# Patient Record
Sex: MALE | Race: WHITE | Employment: FULL TIME | URBAN - METROPOLITAN AREA
[De-identification: names, ages, dates, MRNs, and addresses within clinical notes are randomized per-mention and may not be internally consistent; named-entity substitution may affect disease eponyms.]

---

## 2017-01-16 ENCOUNTER — PATIENT MESSAGE (OUTPATIENT)
Dept: INTERNAL MEDICINE CLINIC | Facility: CLINIC | Age: 42
End: 2017-01-16

## 2017-01-21 NOTE — TELEPHONE ENCOUNTER
From: Jo Mehta  To: Kota Ordonez MD  Sent: 1/16/2017 1:16 PM CST  Subject: Other    I know another urine test was scheduled for me, can you confirm when I should go for it?  Also is there anything special I need to do in advance for it (lik

## 2017-01-27 ENCOUNTER — APPOINTMENT (OUTPATIENT)
Dept: LAB | Facility: HOSPITAL | Age: 42
End: 2017-01-27
Attending: INTERNAL MEDICINE
Payer: COMMERCIAL

## 2017-01-27 DIAGNOSIS — R82.90 ABNORMAL URINE FINDINGS: ICD-10-CM

## 2017-01-27 LAB
BILIRUB UR QL: NEGATIVE
CLARITY UR: CLEAR
COLOR UR: YELLOW
GLUCOSE UR-MCNC: NEGATIVE MG/DL
HGB UR QL STRIP.AUTO: NEGATIVE
KETONES UR-MCNC: NEGATIVE MG/DL
LEUKOCYTE ESTERASE UR QL STRIP.AUTO: NEGATIVE
NITRITE UR QL STRIP.AUTO: NEGATIVE
PH UR: 6 [PH] (ref 5–8)
PROT UR-MCNC: NEGATIVE MG/DL
SP GR UR STRIP: 1.01 (ref 1–1.03)
UROBILINOGEN UR STRIP-ACNC: <2
VIT C UR-MCNC: NEGATIVE MG/DL

## 2017-01-27 PROCEDURE — 81003 URINALYSIS AUTO W/O SCOPE: CPT

## 2017-02-13 DIAGNOSIS — I10 ESSENTIAL HYPERTENSION: ICD-10-CM

## 2017-02-14 NOTE — TELEPHONE ENCOUNTER
From: Vicki Summers  To: Dayanara Allison MD  Sent: 2/13/2017 7:35 AM CST  Subject: Medication Renewal Request    Original authorizing provider: MD Vicki Braun would like a refill of the following medications:  Margo Armenta

## 2017-02-14 NOTE — TELEPHONE ENCOUNTER
Hypertensive Medications: Failed per protocol please advise    Protocol Criteria:  · Appointment scheduled in the past 6 months or in the next 3 months  · BMP or CMP in the past 12 months  · Creatinine result < 2  Recent Visits       Provider Department Pr

## 2017-02-15 RX ORDER — LISINOPRIL AND HYDROCHLOROTHIAZIDE 12.5; 1 MG/1; MG/1
1 TABLET ORAL DAILY
Qty: 90 TABLET | Refills: 0 | Status: SHIPPED | OUTPATIENT
Start: 2017-02-15 | End: 2017-05-09

## 2017-05-04 DIAGNOSIS — I10 ESSENTIAL HYPERTENSION: ICD-10-CM

## 2017-05-08 ENCOUNTER — PATIENT MESSAGE (OUTPATIENT)
Dept: INTERNAL MEDICINE CLINIC | Facility: CLINIC | Age: 42
End: 2017-05-08

## 2017-05-09 RX ORDER — LISINOPRIL AND HYDROCHLOROTHIAZIDE 12.5; 1 MG/1; MG/1
1 TABLET ORAL DAILY
Qty: 30 TABLET | Refills: 0 | Status: SHIPPED | OUTPATIENT
Start: 2017-05-09 | End: 2017-05-09

## 2017-05-09 RX ORDER — LISINOPRIL AND HYDROCHLOROTHIAZIDE 12.5; 1 MG/1; MG/1
1 TABLET ORAL DAILY
Qty: 30 TABLET | Refills: 0 | Status: SHIPPED | OUTPATIENT
Start: 2017-05-09 | End: 2017-06-13

## 2017-05-09 NOTE — TELEPHONE ENCOUNTER
Please advise on refill request.       From   Vicki Summers    To   Willian Mo MD    Sent   5/8/2017 11:55 AM         I sent in a refill request last week but have not heard anything so wanted to check on the status of it since I leave for vac

## 2017-05-09 NOTE — TELEPHONE ENCOUNTER
From: Syed Mahoney  To: Myra Myles MD  Sent: 5/8/2017 11:55 AM CDT  Subject: Prescription Question    I sent in a refill request last week but have not heard anything so wanted to check on the status of it since I leave for vacation in about

## 2017-05-09 NOTE — TELEPHONE ENCOUNTER
From: Shanna Rocha  To: Katelyn Kline MD  Sent: 5/4/2017 7:45 AM CDT  Subject: Medication Renewal Request    Original authorizing provider: MD Shanna Salgado would like a refill of the following medications:  Larissa Ranch

## 2017-05-09 NOTE — TELEPHONE ENCOUNTER
Hypertensive Medications  Protocol Criteria:  · Appointment scheduled in the past 6 months or in the next 3 months  · BMP or CMP in the past 12 months  · Creatinine result < 2  Recent Visits       Provider Department Primary Dx    10 months ago Hernando Triplett,

## 2017-06-12 ENCOUNTER — TELEPHONE (OUTPATIENT)
Dept: INTERNAL MEDICINE CLINIC | Facility: CLINIC | Age: 42
End: 2017-06-12

## 2017-06-12 DIAGNOSIS — I10 ESSENTIAL HYPERTENSION: ICD-10-CM

## 2017-06-13 RX ORDER — LISINOPRIL AND HYDROCHLOROTHIAZIDE 12.5; 1 MG/1; MG/1
1 TABLET ORAL DAILY
Qty: 30 TABLET | Refills: 0 | Status: SHIPPED | OUTPATIENT
Start: 2017-06-13 | End: 2017-06-19

## 2017-06-13 NOTE — TELEPHONE ENCOUNTER
From: Ana Valdovinos  To: Alec Yu MD  Sent: 6/12/2017 8:19 AM CDT  Subject: Medication Renewal Request    Original authorizing provider: MD Ana Luu would like a refill of the following medications:  Samuel Nomran

## 2017-06-13 NOTE — TELEPHONE ENCOUNTER
Hypertensive Medications  Protocol Criteria:  · Appointment scheduled in the past 6 months or in the next 3 months  · BMP or CMP in the past 12 months  · Creatinine result < 2  Recent Visits       Provider Department Primary Dx    12 months ago Larry Knapp

## 2017-06-14 ENCOUNTER — PATIENT MESSAGE (OUTPATIENT)
Dept: INTERNAL MEDICINE CLINIC | Facility: CLINIC | Age: 42
End: 2017-06-14

## 2017-06-14 DIAGNOSIS — I10 ESSENTIAL HYPERTENSION: Primary | ICD-10-CM

## 2017-06-19 NOTE — TELEPHONE ENCOUNTER
From: Mary Lainez  To: Adin Camacho MD  Sent: 6/14/2017 8:00 AM CDT  Subject: Prescription Question    I did get the message about needing to get an appointment and I do have one scheduled for 8/21 at 11:30am for an annual physical. Is there

## 2017-06-20 RX ORDER — LISINOPRIL AND HYDROCHLOROTHIAZIDE 12.5; 1 MG/1; MG/1
1 TABLET ORAL DAILY
Qty: 30 TABLET | Refills: 1 | Status: SHIPPED | OUTPATIENT
Start: 2017-06-20 | End: 2017-07-19

## 2017-07-19 DIAGNOSIS — I10 ESSENTIAL HYPERTENSION: ICD-10-CM

## 2017-07-19 RX ORDER — LISINOPRIL AND HYDROCHLOROTHIAZIDE 12.5; 1 MG/1; MG/1
1 TABLET ORAL DAILY
Qty: 30 TABLET | Refills: 1 | Status: SHIPPED
Start: 2017-07-19 | End: 2017-08-30

## 2017-07-19 NOTE — TELEPHONE ENCOUNTER
Hypertensive Medications  Protocol Criteria:  · Appointment scheduled in the past 6 months or in the next 3 months  · BMP or CMP in the past 12 months  · Creatinine result < 2  Recent Outpatient Visits            1 year ago Essential hypertension    [place]

## 2017-07-19 NOTE — TELEPHONE ENCOUNTER
Signed Prescriptions Disp Refills    Lisinopril-Hydrochlorothiazide 10-12.5 MG Oral Tab 30 tablet 1      Sig: Take 1 tablet by mouth daily.         Authorizing Provider: EHSAN Almeida        Ordering User: Sergio Barnett           Refill approved per

## 2017-07-19 NOTE — TELEPHONE ENCOUNTER
From: Livan Edmond  Sent: 7/19/2017 8:27 AM CDT  Subject: Medication Renewal Request    Livan Edmond would like a refill of the following medications:  Lisinopril-Hydrochlorothiazide 10-12.5 MG Oral Tab [Natan Soriano MD]    Preferred pha

## 2017-08-21 ENCOUNTER — OFFICE VISIT (OUTPATIENT)
Dept: INTERNAL MEDICINE CLINIC | Facility: CLINIC | Age: 42
End: 2017-08-21

## 2017-08-21 VITALS
SYSTOLIC BLOOD PRESSURE: 133 MMHG | BODY MASS INDEX: 31.42 KG/M2 | WEIGHT: 232 LBS | DIASTOLIC BLOOD PRESSURE: 87 MMHG | HEART RATE: 69 BPM | HEIGHT: 72 IN

## 2017-08-21 DIAGNOSIS — Z00.00 PHYSICAL EXAM, ANNUAL: Primary | ICD-10-CM

## 2017-08-21 PROCEDURE — 99396 PREV VISIT EST AGE 40-64: CPT | Performed by: INTERNAL MEDICINE

## 2017-08-21 NOTE — PROGRESS NOTES
Marc Drew is a 36year old male who presents for a complete physical exam.   HPI:   Pt complains of nothing.        Immunization History  Administered            Date(s) Administered    IPV                   10/02/2014      Influenza             10/ GENERAL: feels well otherwise  SKIN: denies any unusual skin lesions  EYES:denies blurred vision or double vision  HEENT: denies nasal congestion, sinus pain or ST  LUNGS: denies shortness of breath with exertion/ has snoring  CARDIOVASCULAR: denies ch screening.     - CBC WITH DIFFERENTIAL WITH PLATELET; Future  - COMP METABOLIC PANEL (14); Future  - LIPID PANEL;  Future  - VITAMIN D, 25-HYDROXY; Future  - ASSAY, THYROID STIM HORMONE; Future  - URINALYSIS, ROUTINE; Future    The patient indicates underst

## 2017-08-30 DIAGNOSIS — I10 ESSENTIAL HYPERTENSION: ICD-10-CM

## 2017-08-31 RX ORDER — LISINOPRIL AND HYDROCHLOROTHIAZIDE 12.5; 1 MG/1; MG/1
1 TABLET ORAL DAILY
Qty: 90 TABLET | Refills: 0 | Status: SHIPPED
Start: 2017-08-31 | End: 2017-11-20

## 2017-08-31 NOTE — TELEPHONE ENCOUNTER
Hypertensive Medications  Protocol Criteria:  · Appointment scheduled in the past 6 months or in the next 3 months  · BMP or CMP in the past 12 months  · Creatinine result < 2  Recent Outpatient Visits            1 week ago Physical exam, annual    [facility]

## 2017-08-31 NOTE — TELEPHONE ENCOUNTER
From: Zari Toribio  Sent: 8/30/2017 8:57 AM CDT  Subject: Medication Renewal Request    Zari Toribio would like a refill of the following medications:  Lisinopril-Hydrochlorothiazide 10-12.5 MG Oral Tab [Natan Jc MD]    Preferred pha

## 2017-09-15 ENCOUNTER — LAB ENCOUNTER (OUTPATIENT)
Dept: LAB | Facility: HOSPITAL | Age: 42
End: 2017-09-15
Attending: INTERNAL MEDICINE
Payer: COMMERCIAL

## 2017-09-15 DIAGNOSIS — Z00.00 PHYSICAL EXAM, ANNUAL: ICD-10-CM

## 2017-09-15 LAB
25(OH)D3 SERPL-MCNC: 24.1 NG/ML
ALBUMIN SERPL BCP-MCNC: 4.5 G/DL (ref 3.5–4.8)
ALBUMIN/GLOB SERPL: 1.6 {RATIO} (ref 1–2)
ALP SERPL-CCNC: 52 U/L (ref 32–100)
ALT SERPL-CCNC: 33 U/L (ref 17–63)
ANION GAP SERPL CALC-SCNC: 7 MMOL/L (ref 0–18)
AST SERPL-CCNC: 28 U/L (ref 15–41)
BASOPHILS # BLD: 0.1 K/UL (ref 0–0.2)
BASOPHILS NFR BLD: 2 %
BILIRUB SERPL-MCNC: 0.6 MG/DL (ref 0.3–1.2)
BILIRUB UR QL: NEGATIVE
BUN SERPL-MCNC: 20 MG/DL (ref 8–20)
BUN/CREAT SERPL: 17.9 (ref 10–20)
CALCIUM SERPL-MCNC: 9.8 MG/DL (ref 8.5–10.5)
CHLORIDE SERPL-SCNC: 103 MMOL/L (ref 95–110)
CHOLEST SERPL-MCNC: 211 MG/DL (ref 110–200)
CLARITY UR: CLEAR
CO2 SERPL-SCNC: 27 MMOL/L (ref 22–32)
COLOR UR: YELLOW
CREAT SERPL-MCNC: 1.12 MG/DL (ref 0.5–1.5)
EOSINOPHIL # BLD: 0.1 K/UL (ref 0–0.7)
EOSINOPHIL NFR BLD: 4 %
ERYTHROCYTE [DISTWIDTH] IN BLOOD BY AUTOMATED COUNT: 12.6 % (ref 11–15)
GLOBULIN PLAS-MCNC: 2.8 G/DL (ref 2.5–3.7)
GLUCOSE SERPL-MCNC: 93 MG/DL (ref 70–99)
GLUCOSE UR-MCNC: NEGATIVE MG/DL
HCT VFR BLD AUTO: 41.7 % (ref 41–52)
HDLC SERPL-MCNC: 57 MG/DL
HGB BLD-MCNC: 14.8 G/DL (ref 13.5–17.5)
HGB UR QL STRIP.AUTO: NEGATIVE
KETONES UR-MCNC: NEGATIVE MG/DL
LDLC SERPL CALC-MCNC: 135 MG/DL (ref 0–99)
LEUKOCYTE ESTERASE UR QL STRIP.AUTO: NEGATIVE
LYMPHOCYTES # BLD: 1.6 K/UL (ref 1–4)
LYMPHOCYTES NFR BLD: 39 %
MCH RBC QN AUTO: 34.8 PG (ref 27–32)
MCHC RBC AUTO-ENTMCNC: 35.4 G/DL (ref 32–37)
MCV RBC AUTO: 98.2 FL (ref 80–100)
MONOCYTES # BLD: 0.4 K/UL (ref 0–1)
MONOCYTES NFR BLD: 11 %
NEUTROPHILS # BLD AUTO: 1.9 K/UL (ref 1.8–7.7)
NEUTROPHILS NFR BLD: 45 %
NITRITE UR QL STRIP.AUTO: NEGATIVE
NONHDLC SERPL-MCNC: 154 MG/DL
OSMOLALITY UR CALC.SUM OF ELEC: 286 MOSM/KG (ref 275–295)
PH UR: 6 [PH] (ref 5–8)
PLATELET # BLD AUTO: 221 K/UL (ref 140–400)
PMV BLD AUTO: 6.7 FL (ref 7.4–10.3)
POTASSIUM SERPL-SCNC: 4.9 MMOL/L (ref 3.3–5.1)
PROT SERPL-MCNC: 7.3 G/DL (ref 5.9–8.4)
PROT UR-MCNC: NEGATIVE MG/DL
RBC # BLD AUTO: 4.25 M/UL (ref 4.5–5.9)
SODIUM SERPL-SCNC: 137 MMOL/L (ref 136–144)
SP GR UR STRIP: 1.01 (ref 1–1.03)
TRIGL SERPL-MCNC: 96 MG/DL (ref 1–149)
TSH SERPL-ACNC: 1.05 UIU/ML (ref 0.45–5.33)
UROBILINOGEN UR STRIP-ACNC: <2
VIT C UR-MCNC: NEGATIVE MG/DL
WBC # BLD AUTO: 4.1 K/UL (ref 4–11)

## 2017-09-15 PROCEDURE — 82306 VITAMIN D 25 HYDROXY: CPT

## 2017-09-15 PROCEDURE — 84443 ASSAY THYROID STIM HORMONE: CPT

## 2017-09-15 PROCEDURE — 80061 LIPID PANEL: CPT

## 2017-09-15 PROCEDURE — 36415 COLL VENOUS BLD VENIPUNCTURE: CPT

## 2017-09-15 PROCEDURE — 80053 COMPREHEN METABOLIC PANEL: CPT

## 2017-09-15 PROCEDURE — 81003 URINALYSIS AUTO W/O SCOPE: CPT

## 2017-09-15 PROCEDURE — 85025 COMPLETE CBC W/AUTO DIFF WBC: CPT

## 2017-09-21 ENCOUNTER — PATIENT MESSAGE (OUTPATIENT)
Dept: INTERNAL MEDICINE CLINIC | Facility: CLINIC | Age: 42
End: 2017-09-21

## 2017-09-21 ENCOUNTER — TELEPHONE (OUTPATIENT)
Dept: OTHER | Age: 42
End: 2017-09-21

## 2017-09-21 DIAGNOSIS — E55.9 VITAMIN D DEFICIENCY: Primary | ICD-10-CM

## 2017-09-21 RX ORDER — CHOLECALCIFEROL (VITAMIN D3) 1250 MCG
CAPSULE ORAL
Qty: 1 CAPSULE | Refills: 0 | Status: CANCELLED | OUTPATIENT
Start: 2017-09-21 | End: 2017-10-21

## 2017-09-21 NOTE — TELEPHONE ENCOUNTER
lmtcb    Lab ordered. Medication pended. ----- Message from Shawnee Velazco MD sent at 9/21/2017 12:31 PM CDT -----  Blood test is good with normal blood counts, sugar, liver, thyroid, urine, lipids and kidney tests.  Vitamin D level is quite low

## 2017-09-21 NOTE — TELEPHONE ENCOUNTER
From: Marc Drew  To: Rao Duarte MD  Sent: 9/21/2017 12:43 PM CDT  Subject: Test Results Question    I also had a test prescribed from my wife's doctor to see what blood type I am. I do not see any results on that do you know why?     Also

## 2017-09-21 NOTE — TELEPHONE ENCOUNTER
Please note that pt sent [employer] message as well with additional questions regarding these labs. Please respond to him on [facility] with the information he seeks.

## 2017-09-22 RX ORDER — ERGOCALCIFEROL 1.25 MG/1
50000 CAPSULE ORAL WEEKLY
Qty: 12 CAPSULE | Refills: 0 | Status: SHIPPED | OUTPATIENT
Start: 2017-09-22 | End: 2017-12-09

## 2017-09-22 RX ORDER — CHOLECALCIFEROL (VITAMIN D3) 125 MCG
1 CAPSULE ORAL DAILY
Qty: 1 TABLET | Refills: 0 | COMMUNITY
Start: 2017-09-22 | End: 2017-10-22

## 2017-09-22 NOTE — TELEPHONE ENCOUNTER
LMTCB- transfer to triage    From: Jo Mehta  To: Kota Ordonez MD  Sent: 9/21/2017 12:43 PM CDT  Subject: Test Results Question    I also had a test prescribed from my wife's doctor to see what blood type I am. I do not see any results on t

## 2017-09-22 NOTE — TELEPHONE ENCOUNTER
Dr. SOLORIO, please see pt message below. I tried to order vitamin d2 50,000, but it will not let me send. Vitamin D3 is coming up. Please advise. Thanks.

## 2017-09-22 NOTE — TELEPHONE ENCOUNTER
Viewed by Jacki Morales on 9/21/2017 12:39 PM   Written by Perla Acuna MD on 9/21/2017 12:31 PM   Blood test is good with normal blood counts, sugar, liver, thyroid, urine, lipids and kidney tests. Vitamin D level is quite low.  Start pill cont

## 2017-09-22 NOTE — TELEPHONE ENCOUNTER
Pt returned call and informed Vit D will be sent to pharmacy now ([facility] pharmacy). Also advised to contact ordering provider as that provider should have gotten lab results he seeks.  Provided with [facility] phone # if any issues, although that provide

## 2017-09-26 LAB — RH BLOOD TYPE: POSITIVE

## 2017-09-26 PROCEDURE — 36415 COLL VENOUS BLD VENIPUNCTURE: CPT

## 2017-09-26 PROCEDURE — 86900 BLOOD TYPING SEROLOGIC ABO: CPT

## 2017-09-26 PROCEDURE — 86901 BLOOD TYPING SEROLOGIC RH(D): CPT

## 2017-11-20 DIAGNOSIS — I10 ESSENTIAL HYPERTENSION: ICD-10-CM

## 2017-11-20 RX ORDER — LISINOPRIL AND HYDROCHLOROTHIAZIDE 12.5; 1 MG/1; MG/1
1 TABLET ORAL DAILY
Qty: 90 TABLET | Refills: 2 | Status: SHIPPED
Start: 2017-11-20 | End: 2018-03-09

## 2017-11-20 NOTE — TELEPHONE ENCOUNTER
From: Ana Valdovinos  Sent: 11/20/2017 9:17 AM CST  Subject: Medication Renewal Request    Ana Valdovinos would like a refill of the following medications:     Lisinopril-Hydrochlorothiazide 10-12.5 MG Oral Tab [Natan Abreu MD]   Patient Co

## 2017-11-20 NOTE — TELEPHONE ENCOUNTER
Signed Prescriptions Disp Refills    Lisinopril-Hydrochlorothiazide 10-12.5 MG Oral Tab 90 tablet 2      Sig: Take 1 tablet by mouth daily.         Authorizing Provider: EHSAN Johnson        Ordering User: Monae Ledbetter           Refill approved iban

## 2017-12-19 ENCOUNTER — PATIENT MESSAGE (OUTPATIENT)
Dept: INTERNAL MEDICINE CLINIC | Facility: CLINIC | Age: 42
End: 2017-12-19

## 2017-12-20 NOTE — TELEPHONE ENCOUNTER
From: Zari Toribio  To: Lovely Adhikari MD  Sent: 12/19/2017 10:52 AM CST  Subject: Test Results Question    I am supposed to have a Vitamin D blood test done again now that I am done with the medication that was given to me and I do not see it p

## 2018-01-19 ENCOUNTER — APPOINTMENT (OUTPATIENT)
Dept: LAB | Facility: HOSPITAL | Age: 43
End: 2018-01-19
Attending: INTERNAL MEDICINE
Payer: COMMERCIAL

## 2018-01-19 DIAGNOSIS — E55.9 VITAMIN D DEFICIENCY: ICD-10-CM

## 2018-01-19 LAB — 25(OH)D3 SERPL-MCNC: 39.4 NG/ML

## 2018-01-19 PROCEDURE — 36415 COLL VENOUS BLD VENIPUNCTURE: CPT

## 2018-01-19 PROCEDURE — 82306 VITAMIN D 25 HYDROXY: CPT

## 2018-02-03 ENCOUNTER — HOSPITAL ENCOUNTER (OUTPATIENT)
Dept: CT IMAGING | Facility: HOSPITAL | Age: 43
Discharge: HOME | End: 2018-02-03
Attending: INTERNAL MEDICINE

## 2018-02-03 VITALS — DIASTOLIC BLOOD PRESSURE: 75 MMHG | HEART RATE: 66 BPM | SYSTOLIC BLOOD PRESSURE: 115 MMHG

## 2018-02-03 DIAGNOSIS — Z13.9 SCREENING FOR CONDITION: ICD-10-CM

## 2018-02-07 ENCOUNTER — TELEPHONE (OUTPATIENT)
Dept: INTERNAL MEDICINE CLINIC | Facility: CLINIC | Age: 43
End: 2018-02-07

## 2018-02-07 ENCOUNTER — PATIENT MESSAGE (OUTPATIENT)
Dept: INTERNAL MEDICINE CLINIC | Facility: CLINIC | Age: 43
End: 2018-02-07

## 2018-02-07 ENCOUNTER — TELEPHONE (OUTPATIENT)
Dept: OTHER | Age: 43
End: 2018-02-07

## 2018-02-07 DIAGNOSIS — E78.2 MIXED HYPERLIPIDEMIA: Primary | ICD-10-CM

## 2018-02-07 RX ORDER — ATORVASTATIN CALCIUM 10 MG/1
10 TABLET, FILM COATED ORAL NIGHTLY
Qty: 90 TABLET | Refills: 3 | Status: SHIPPED | OUTPATIENT
Start: 2018-02-07 | End: 2018-02-07

## 2018-02-07 RX ORDER — ATORVASTATIN CALCIUM 10 MG/1
10 TABLET, FILM COATED ORAL NIGHTLY
Qty: 90 TABLET | Refills: 3 | Status: SHIPPED | OUTPATIENT
Start: 2018-02-07 | End: 2018-04-24

## 2018-02-07 NOTE — TELEPHONE ENCOUNTER
From: Radha Durham  To: Mary Rios MD  Sent: 2/7/2018 9:26 AM CST  Subject: Test Results Question    I saw the message about going on cholesterol lowering medications due to the recent test results that were sent.  Is this something that is g

## 2018-02-07 NOTE — TELEPHONE ENCOUNTER
LMTCB. Transfer to [id number].      ----- Message from Zelalem Kuo MD sent at 2/6/2018  3:08 PM CST -----  Coronary calcium score is mildy elevated and suggested at least some coronary disease although mild.  With that reading and an LDL cholesterol I wo

## 2018-02-07 NOTE — TELEPHONE ENCOUNTER
Dr. Clay Hutn, see e-mail correspondence above. In regards to your results note I will copy below.  Patient is agreeable to starting cholesterol medication and asking if it can be ordered and then he can follow up with you on it at his physical that he will

## 2018-02-09 NOTE — TELEPHONE ENCOUNTER
Ktaerin Swenson from [address] is calling to inform staff that this medication was escript to the pharmacy. It has been closed since 12/30. pls send to another preferred pharmacy.  Thank you

## 2018-03-09 DIAGNOSIS — I10 ESSENTIAL HYPERTENSION: ICD-10-CM

## 2018-03-09 NOTE — TELEPHONE ENCOUNTER
From: Ariella Currie  Sent: 3/9/2018 10:11 AM CST  Subject: Medication Renewal Request    Ariella Currie would like a refill of the following medications:     Lisinopril-Hydrochlorothiazide 10-12.5 MG Oral Tab [Natan Mcclain MD]    Preferred

## 2018-03-11 RX ORDER — LISINOPRIL AND HYDROCHLOROTHIAZIDE 12.5; 1 MG/1; MG/1
1 TABLET ORAL DAILY
Qty: 90 TABLET | Refills: 2 | Status: SHIPPED | OUTPATIENT
Start: 2018-03-11 | End: 2018-12-03

## 2018-04-12 ENCOUNTER — TELEPHONE (OUTPATIENT)
Dept: INTERNAL MEDICINE CLINIC | Facility: CLINIC | Age: 43
End: 2018-04-12

## 2018-04-12 NOTE — TELEPHONE ENCOUNTER
Pt called in stating that his wife just gave birth and he was informed he needed a Tdap vaccination. He is requesting an order for that and to be notified when he can schedule a nurse visit. Please advise.      Please reply to pool: RANDA Keating

## 2018-04-14 ENCOUNTER — NURSE ONLY (OUTPATIENT)
Dept: INTERNAL MEDICINE CLINIC | Facility: CLINIC | Age: 43
End: 2018-04-14

## 2018-04-14 DIAGNOSIS — Z23 NEED FOR PROPHYLACTIC VACCINATION WITH COMBINED DIPHTHERIA-TETANUS-PERTUSSIS (DTP) VACCINE: ICD-10-CM

## 2018-04-14 PROCEDURE — 90715 TDAP VACCINE 7 YRS/> IM: CPT | Performed by: INTERNAL MEDICINE

## 2018-04-14 PROCEDURE — 90471 IMMUNIZATION ADMIN: CPT | Performed by: INTERNAL MEDICINE

## 2018-04-14 NOTE — PROGRESS NOTES
Pt presents for TDAP vaccine per Dr Linsey Ramirez- pt  Name and  verified , pt administered TDAP vaccine IM in the left deltoid, pt tolerated injection well, no adverse reaction noted

## 2018-04-17 ENCOUNTER — APPOINTMENT (OUTPATIENT)
Dept: LAB | Facility: HOSPITAL | Age: 43
End: 2018-04-17
Attending: INTERNAL MEDICINE
Payer: COMMERCIAL

## 2018-04-17 DIAGNOSIS — E78.2 MIXED HYPERLIPIDEMIA: ICD-10-CM

## 2018-04-17 PROCEDURE — 36415 COLL VENOUS BLD VENIPUNCTURE: CPT

## 2018-04-17 PROCEDURE — 80076 HEPATIC FUNCTION PANEL: CPT

## 2018-04-17 PROCEDURE — 80061 LIPID PANEL: CPT

## 2018-04-25 RX ORDER — ATORVASTATIN CALCIUM 10 MG/1
10 TABLET, FILM COATED ORAL NIGHTLY
Qty: 90 TABLET | Refills: 0 | Status: SHIPPED
Start: 2018-04-25 | End: 2018-07-23

## 2018-04-25 NOTE — TELEPHONE ENCOUNTER
Cholesterol Medications  Protocol Criteria:  · Appointment scheduled in the past 12 months or in the next 3 months  · ALT & LDL on file in the past 12 months  · ALT result < 80  · LDL result <130   Recent Outpatient Visits            1 week ago Need for pr

## 2018-06-14 ENCOUNTER — OFFICE VISIT (OUTPATIENT)
Dept: ALLERGY | Facility: CLINIC | Age: 43
End: 2018-06-14

## 2018-06-14 ENCOUNTER — NURSE ONLY (OUTPATIENT)
Dept: ALLERGY | Facility: CLINIC | Age: 43
End: 2018-06-14

## 2018-06-14 VITALS
OXYGEN SATURATION: 99 % | DIASTOLIC BLOOD PRESSURE: 70 MMHG | WEIGHT: 235 LBS | TEMPERATURE: 98 F | BODY MASS INDEX: 31.83 KG/M2 | SYSTOLIC BLOOD PRESSURE: 120 MMHG | HEART RATE: 77 BPM | HEIGHT: 72 IN

## 2018-06-14 DIAGNOSIS — J30.2 SEASONAL ALLERGIC RHINITIS, UNSPECIFIED TRIGGER: Primary | ICD-10-CM

## 2018-06-14 DIAGNOSIS — J30.1 ALLERGIC RHINITIS DUE TO POLLEN, UNSPECIFIED SEASONALITY: ICD-10-CM

## 2018-06-14 PROCEDURE — 95024 IQ TESTS W/ALLERGENIC XTRCS: CPT | Performed by: ALLERGY & IMMUNOLOGY

## 2018-06-14 PROCEDURE — 99204 OFFICE O/P NEW MOD 45 MIN: CPT | Performed by: ALLERGY & IMMUNOLOGY

## 2018-06-14 PROCEDURE — 95004 PERQ TESTS W/ALRGNC XTRCS: CPT | Performed by: ALLERGY & IMMUNOLOGY

## 2018-06-14 PROCEDURE — 99212 OFFICE O/P EST SF 10 MIN: CPT | Performed by: ALLERGY & IMMUNOLOGY

## 2018-06-14 RX ORDER — LEVOCETIRIZINE DIHYDROCHLORIDE 5 MG/1
5 TABLET, FILM COATED ORAL NIGHTLY
Qty: 30 TABLET | Refills: 0 | Status: SHIPPED | OUTPATIENT
Start: 2018-06-14 | End: 2018-11-21

## 2018-06-14 RX ORDER — MOMETASONE 50 UG/1
2 SPRAY, METERED NASAL DAILY
Qty: 1 INHALER | Refills: 0 | Status: SHIPPED | OUTPATIENT
Start: 2018-06-14 | End: 2018-07-10

## 2018-06-14 NOTE — PATIENT INSTRUCTIONS
Recs: Handouts on allergies and avoidance measures provided and reviewed including the potential treatment option of immunotherapy  Start Xyzal, levo cetirizine 5 mg once a night at bedtime  Start Nasonex, mometasone 2 sprays per nostril once a day.   Revie

## 2018-06-14 NOTE — PROGRESS NOTES
Ana Valdovinos is a 36year old male.     HPI:   Patient presents with:  Consult: seasonal allergies x several years w  Sinus Problem: runny and congestion was prescribed flonase which was not helping   Eye Problem: itchy ,dry irritated   Throat Problem: sweats,weight loss, irritability and lethargy  Endocrine:  Negative for cold intolerance, polydipsia and polyphagia  ENMT:  Negative for ear drainage, hearing loss.  See hpi   Eyes:  Negative for eye discharge and vision loss  Gastrointestinal:  Negative fo improvement at best.  Patient is also tried over-the-counter antihistamines    No pets or smokers in the home    Skin testing today to environmental allergens to screen for allergic triggers was + to tree, grass , rw, weeds, mold       Recs: Handouts on al

## 2018-07-10 NOTE — TELEPHONE ENCOUNTER
Received refill request for:    Medication Quantity Refill Last Filled    Mometasone Furoate (NASONEX) 50 MCG/ACT Nasal Suspension 1 Inhaler 0 6/14/2018    Sig :  2 sprays by Nasal route daily.        LOV: 6/14/18      Advised f/u: n/a   Scheduled Appointme

## 2018-07-11 RX ORDER — MOMETASONE 50 UG/1
SPRAY, METERED NASAL
Qty: 1 BOTTLE | Refills: 0 | Status: SHIPPED | OUTPATIENT
Start: 2018-07-11 | End: 2018-11-21

## 2018-07-11 NOTE — TELEPHONE ENCOUNTER
Request reviewed and noted. Nasonex refilled ×1. Patient last seen for initial consult in June 2018.   Please schedule a follow-up appointment for the next 2-4 weeks to evaluate response to treatment and for further refills

## 2018-07-23 RX ORDER — ATORVASTATIN CALCIUM 10 MG/1
10 TABLET, FILM COATED ORAL NIGHTLY
Qty: 90 TABLET | Refills: 0 | Status: SHIPPED | OUTPATIENT
Start: 2018-07-23 | End: 2019-05-09

## 2018-08-14 ENCOUNTER — HOSPITAL ENCOUNTER (OUTPATIENT)
Dept: GENERAL RADIOLOGY | Age: 43
Discharge: HOME | End: 2018-08-14
Attending: ORTHOPAEDIC SURGERY
Payer: COMMERCIAL

## 2018-08-14 ENCOUNTER — PATIENT MESSAGE (OUTPATIENT)
Dept: ALLERGY | Facility: CLINIC | Age: 43
End: 2018-08-14

## 2018-08-14 ENCOUNTER — TELEPHONE (OUTPATIENT)
Dept: ALLERGY | Facility: CLINIC | Age: 43
End: 2018-08-14

## 2018-08-14 DIAGNOSIS — M25.512 LEFT SHOULDER PAIN: ICD-10-CM

## 2018-08-14 PROCEDURE — 73030 X-RAY EXAM OF SHOULDER: CPT | Performed by: ORTHOPAEDIC SURGERY

## 2018-08-14 NOTE — TELEPHONE ENCOUNTER
Immunotherapy order received. Chart completed. Filed in AIT cabinet. No further action needed at this time.

## 2018-08-14 NOTE — TELEPHONE ENCOUNTER
Dr. Ravi Mukherjee replied to patient's question via [address] Per records, patient viewed and read message. Please see [employer] message for additional details.

## 2018-08-14 NOTE — TELEPHONE ENCOUNTER
See pt's message and please advise. Pt was seen 6/14/2018 in Allergy and found  .  . .    Skin testing today to environmental allergens to screen for allergic triggers was + to tree, grass , rw, weeds, mold

## 2018-08-14 NOTE — TELEPHONE ENCOUNTER
From: Judah Head  To: Carley Hilliard MD  Sent: 8/14/2018 7:53 AM CDT  Subject: Visit Follow-up Question    I am going to schedule my follow up and think I would like to go the route of allergy shots.  Would this be something we would just talk abo

## 2018-08-17 ENCOUNTER — PATIENT MESSAGE (OUTPATIENT)
Dept: ALLERGY | Facility: CLINIC | Age: 43
End: 2018-08-17

## 2018-08-17 NOTE — TELEPHONE ENCOUNTER
From: Justin Kingston  To: Maninder Mcneil MD  Sent: 8/17/2018 10:25 AM CDT  Subject: Visit Follow-up Question    Just wanted to see what the earliest appointment time during the week is as I would like to make an appointment to start with allergy shot

## 2018-08-20 NOTE — TELEPHONE ENCOUNTER
Good Morning Mr. Munguia,    Dr. Alberto Young has noted that he does not need to see you for follow-up appointment prior to beginning Allergy Injection, since we already have orders from Dr. Alberto Young to begin your immunotherapy.   We do not schedule immunotherapy inj

## 2018-08-23 ENCOUNTER — NURSE ONLY (OUTPATIENT)
Dept: ALLERGY | Facility: CLINIC | Age: 43
End: 2018-08-23
Payer: COMMERCIAL

## 2018-08-23 DIAGNOSIS — J30.89 ENVIRONMENTAL AND SEASONAL ALLERGIES: ICD-10-CM

## 2018-08-23 PROCEDURE — 95117 IMMUNOTHERAPY INJECTIONS: CPT | Performed by: ALLERGY & IMMUNOLOGY

## 2018-08-23 PROCEDURE — 95165 ANTIGEN THERAPY SERVICES: CPT | Performed by: ALLERGY & IMMUNOLOGY

## 2018-08-30 ENCOUNTER — NURSE ONLY (OUTPATIENT)
Dept: ALLERGY | Facility: CLINIC | Age: 43
End: 2018-08-30
Payer: COMMERCIAL

## 2018-08-30 DIAGNOSIS — J30.1 ALLERGIC RHINITIS DUE TO POLLEN, UNSPECIFIED SEASONALITY: ICD-10-CM

## 2018-08-30 PROCEDURE — 95117 IMMUNOTHERAPY INJECTIONS: CPT | Performed by: ALLERGY & IMMUNOLOGY

## 2018-09-05 ENCOUNTER — NURSE ONLY (OUTPATIENT)
Dept: ALLERGY | Facility: CLINIC | Age: 43
End: 2018-09-05
Payer: COMMERCIAL

## 2018-09-05 DIAGNOSIS — J30.1 ALLERGIC RHINITIS DUE TO POLLEN, UNSPECIFIED SEASONALITY: ICD-10-CM

## 2018-09-05 PROCEDURE — 95117 IMMUNOTHERAPY INJECTIONS: CPT | Performed by: ALLERGY & IMMUNOLOGY

## 2018-09-13 ENCOUNTER — NURSE ONLY (OUTPATIENT)
Dept: ALLERGY | Facility: CLINIC | Age: 43
End: 2018-09-13
Payer: COMMERCIAL

## 2018-09-13 DIAGNOSIS — J30.89 ENVIRONMENTAL AND SEASONAL ALLERGIES: ICD-10-CM

## 2018-09-13 PROCEDURE — 95117 IMMUNOTHERAPY INJECTIONS: CPT | Performed by: ALLERGY & IMMUNOLOGY

## 2018-09-15 ENCOUNTER — HOSPITAL ENCOUNTER (OUTPATIENT)
Dept: MRI IMAGING | Facility: HOSPITAL | Age: 43
Discharge: HOME | End: 2018-09-15
Attending: ORTHOPAEDIC SURGERY
Payer: COMMERCIAL

## 2018-09-15 DIAGNOSIS — M75.42 IMPINGEMENT SYNDROME OF LEFT SHOULDER: ICD-10-CM

## 2018-09-15 PROCEDURE — 73221 MRI JOINT UPR EXTREM W/O DYE: CPT | Performed by: ORTHOPAEDIC SURGERY

## 2018-09-19 ENCOUNTER — NURSE ONLY (OUTPATIENT)
Dept: ALLERGY | Facility: CLINIC | Age: 43
End: 2018-09-19
Payer: COMMERCIAL

## 2018-09-19 DIAGNOSIS — J30.1 SEASONAL ALLERGIC RHINITIS DUE TO POLLEN: ICD-10-CM

## 2018-09-19 PROCEDURE — 95117 IMMUNOTHERAPY INJECTIONS: CPT | Performed by: ALLERGY & IMMUNOLOGY

## 2018-09-19 PROCEDURE — 95165 ANTIGEN THERAPY SERVICES: CPT | Performed by: ALLERGY & IMMUNOLOGY

## 2018-09-27 ENCOUNTER — NURSE ONLY (OUTPATIENT)
Dept: ALLERGY | Facility: CLINIC | Age: 43
End: 2018-09-27
Payer: COMMERCIAL

## 2018-09-27 DIAGNOSIS — J30.1 ALLERGIC RHINITIS DUE TO POLLEN, UNSPECIFIED SEASONALITY: ICD-10-CM

## 2018-09-27 PROCEDURE — 95117 IMMUNOTHERAPY INJECTIONS: CPT | Performed by: ALLERGY & IMMUNOLOGY

## 2018-09-27 PROCEDURE — 95165 ANTIGEN THERAPY SERVICES: CPT | Performed by: ALLERGY & IMMUNOLOGY

## 2018-10-03 ENCOUNTER — NURSE ONLY (OUTPATIENT)
Dept: ALLERGY | Facility: CLINIC | Age: 43
End: 2018-10-03
Payer: COMMERCIAL

## 2018-10-03 ENCOUNTER — OFFICE VISIT (OUTPATIENT)
Dept: ORTHOPEDICS CLINIC | Facility: CLINIC | Age: 43
End: 2018-10-03
Payer: COMMERCIAL

## 2018-10-03 VITALS — BODY MASS INDEX: 32 KG/M2 | HEIGHT: 72 IN

## 2018-10-03 DIAGNOSIS — J30.1 ALLERGIC RHINITIS DUE TO POLLEN, UNSPECIFIED SEASONALITY: ICD-10-CM

## 2018-10-03 DIAGNOSIS — M75.42 IMPINGEMENT SYNDROME OF LEFT SHOULDER: Primary | ICD-10-CM

## 2018-10-03 PROCEDURE — 95117 IMMUNOTHERAPY INJECTIONS: CPT | Performed by: ALLERGY & IMMUNOLOGY

## 2018-10-03 PROCEDURE — 99212 OFFICE O/P EST SF 10 MIN: CPT | Performed by: ORTHOPAEDIC SURGERY

## 2018-10-03 PROCEDURE — 99243 OFF/OP CNSLTJ NEW/EST LOW 30: CPT | Performed by: ORTHOPAEDIC SURGERY

## 2018-10-03 NOTE — H&P
Chief Complaint: Left shoulder pain    NURSING INTAKE COMMENTS: Patient presents with:  Shoulder Pain: Left - onset toward the end of May - no injury - has pain with certain movements rated as 3-6/10 on and off, has reduced ROM - no numbness or tingling - WNL WNL   Myelopathic signs none none   Apprehension none none          Radiographs and Imaging: MRI was reviewed showing significant AC joint hypertrophy and potential subacromial impingement with rotator cuff tendinosis.   No valerie tear is seen of the rot

## 2018-10-11 ENCOUNTER — NURSE ONLY (OUTPATIENT)
Dept: ALLERGY | Facility: CLINIC | Age: 43
End: 2018-10-11
Payer: COMMERCIAL

## 2018-10-11 DIAGNOSIS — J30.89 ENVIRONMENTAL AND SEASONAL ALLERGIES: ICD-10-CM

## 2018-10-11 PROCEDURE — 95117 IMMUNOTHERAPY INJECTIONS: CPT | Performed by: ALLERGY & IMMUNOLOGY

## 2018-10-18 ENCOUNTER — NURSE ONLY (OUTPATIENT)
Dept: ALLERGY | Facility: CLINIC | Age: 43
End: 2018-10-18
Payer: COMMERCIAL

## 2018-10-18 PROCEDURE — 95117 IMMUNOTHERAPY INJECTIONS: CPT | Performed by: ALLERGY & IMMUNOLOGY

## 2018-10-20 ENCOUNTER — OFFICE VISIT (OUTPATIENT)
Dept: FAMILY MEDICINE CLINIC | Facility: CLINIC | Age: 43
End: 2018-10-20
Payer: COMMERCIAL

## 2018-10-20 VITALS
SYSTOLIC BLOOD PRESSURE: 140 MMHG | BODY MASS INDEX: 32 KG/M2 | HEART RATE: 76 BPM | TEMPERATURE: 98 F | DIASTOLIC BLOOD PRESSURE: 80 MMHG | OXYGEN SATURATION: 98 % | RESPIRATION RATE: 16 BRPM | WEIGHT: 237 LBS

## 2018-10-20 DIAGNOSIS — H61.22 IMPACTED CERUMEN OF LEFT EAR: Primary | ICD-10-CM

## 2018-10-20 PROCEDURE — 99202 OFFICE O/P NEW SF 15 MIN: CPT | Performed by: PHYSICIAN ASSISTANT

## 2018-10-20 NOTE — PATIENT INSTRUCTIONS
Impacted Earwax     Inner ear structures including ear canal and eardrum. Impacted earwax is a buildup of the natural wax in the ear (cerumen). Impacted earwax is very common. It can cause symptoms such as hearing loss.  It can also make it difficult Excess earwax usually does not cause any symptoms, unless there is a large amount of buildup.  Then it may cause symptoms such as:  · Hearing loss  · Earache  · Sense of ear fullness  · Itching in the ear  · Odor from the ear  · Ear drainage  · Dizziness  · © 2023-9673 [employer] [address] [facility] [place] [employer]. All rights reserved. This information is not intended as a substitute for professional medical care. Always follow your healthcare professional's instructions.

## 2018-10-20 NOTE — PROGRESS NOTES
CHIEF COMPLAINT:   Patient presents with:  Ear Problem: states since this am, feels like water in ear      HPI:   Jane Madrid is a 39year old male who presents to clinic today for fullness of ear since this am. States he thinks there is water in the distress  HEAD: atraumatic, normocephalic  EARS: Pre-procedure: Right : TM unseen 2/2 cerumen, 50% blockage of cerumen in EAC , Left: TM unseen 2/2 cerumen, 100% blockage of cerumen in EAC  LUNGS: clear to auscultation bilaterally, no wheezes or rhonchi.

## 2018-10-23 ENCOUNTER — OFFICE VISIT (OUTPATIENT)
Dept: OTOLARYNGOLOGY | Facility: CLINIC | Age: 43
End: 2018-10-23
Payer: COMMERCIAL

## 2018-10-23 VITALS
SYSTOLIC BLOOD PRESSURE: 118 MMHG | BODY MASS INDEX: 31.41 KG/M2 | WEIGHT: 237 LBS | HEIGHT: 73 IN | TEMPERATURE: 97 F | DIASTOLIC BLOOD PRESSURE: 70 MMHG

## 2018-10-23 DIAGNOSIS — J34.2 DEVIATED SEPTUM: ICD-10-CM

## 2018-10-23 DIAGNOSIS — H61.23 BILATERAL IMPACTED CERUMEN: Primary | ICD-10-CM

## 2018-10-23 PROCEDURE — 99243 OFF/OP CNSLTJ NEW/EST LOW 30: CPT | Performed by: OTOLARYNGOLOGY

## 2018-10-23 PROCEDURE — 92504 EAR MICROSCOPY EXAMINATION: CPT | Performed by: OTOLARYNGOLOGY

## 2018-10-23 PROCEDURE — 99212 OFFICE O/P EST SF 10 MIN: CPT | Performed by: OTOLARYNGOLOGY

## 2018-10-23 NOTE — PROGRESS NOTES
Bernie Ly is a 39year old male. Patient presents with:  Ear Wax: both ears    HPI:   About a week ago he felt that his left ear became completely blocked. He went to immediate care and was given eardrops.   He has been using Debrox in both ears ha Cranial nerves II through XII grossly intact. Neck Exam Normal Inspection - Normal. Palpation - Normal. Parotid gland - Normal. Thyroid gland - Normal.   Psychiatric Normal Orientation - Oriented to time, place, person & situation.  Appropriate mood and a

## 2018-10-24 ENCOUNTER — NURSE ONLY (OUTPATIENT)
Dept: ALLERGY | Facility: CLINIC | Age: 43
End: 2018-10-24
Payer: COMMERCIAL

## 2018-10-24 DIAGNOSIS — J30.1 ALLERGIC RHINITIS DUE TO POLLEN, UNSPECIFIED SEASONALITY: ICD-10-CM

## 2018-10-24 PROCEDURE — 95117 IMMUNOTHERAPY INJECTIONS: CPT | Performed by: ALLERGY & IMMUNOLOGY

## 2018-11-01 ENCOUNTER — NURSE ONLY (OUTPATIENT)
Dept: ALLERGY | Facility: CLINIC | Age: 43
End: 2018-11-01
Payer: COMMERCIAL

## 2018-11-01 DIAGNOSIS — J30.1 ALLERGIC RHINITIS DUE TO POLLEN, UNSPECIFIED SEASONALITY: ICD-10-CM

## 2018-11-01 PROCEDURE — 95165 ANTIGEN THERAPY SERVICES: CPT | Performed by: ALLERGY & IMMUNOLOGY

## 2018-11-01 PROCEDURE — 95117 IMMUNOTHERAPY INJECTIONS: CPT | Performed by: ALLERGY & IMMUNOLOGY

## 2018-11-08 ENCOUNTER — NURSE ONLY (OUTPATIENT)
Dept: ALLERGY | Facility: CLINIC | Age: 43
End: 2018-11-08
Payer: COMMERCIAL

## 2018-11-08 DIAGNOSIS — J30.89 ENVIRONMENTAL AND SEASONAL ALLERGIES: ICD-10-CM

## 2018-11-08 PROCEDURE — 95117 IMMUNOTHERAPY INJECTIONS: CPT | Performed by: ALLERGY & IMMUNOLOGY

## 2018-11-09 ENCOUNTER — TELEPHONE (OUTPATIENT)
Dept: PODIATRY CLINIC | Facility: CLINIC | Age: 43
End: 2018-11-09

## 2018-11-09 NOTE — TELEPHONE ENCOUNTER
Called [employer] and spoke with [facility]  Call reference number [id number]   Surgery date of 11/27/18   [facility] advised No Prior Authorization is needed for out patient surgery

## 2018-11-13 ENCOUNTER — TELEPHONE (OUTPATIENT)
Dept: ORTHOPEDICS CLINIC | Facility: CLINIC | Age: 43
End: 2018-11-13

## 2018-11-15 ENCOUNTER — NURSE ONLY (OUTPATIENT)
Dept: ALLERGY | Facility: CLINIC | Age: 43
End: 2018-11-15
Payer: COMMERCIAL

## 2018-11-15 DIAGNOSIS — J30.1 ALLERGIC RHINITIS DUE TO POLLEN, UNSPECIFIED SEASONALITY: ICD-10-CM

## 2018-11-15 PROCEDURE — 95117 IMMUNOTHERAPY INJECTIONS: CPT | Performed by: ALLERGY & IMMUNOLOGY

## 2018-11-21 ENCOUNTER — NURSE ONLY (OUTPATIENT)
Dept: ALLERGY | Facility: CLINIC | Age: 43
End: 2018-11-21
Payer: COMMERCIAL

## 2018-11-21 DIAGNOSIS — Z91.09 ENVIRONMENTAL ALLERGIES: ICD-10-CM

## 2018-11-21 PROCEDURE — 95117 IMMUNOTHERAPY INJECTIONS: CPT | Performed by: ALLERGY & IMMUNOLOGY

## 2018-11-27 ENCOUNTER — ANESTHESIA EVENT (OUTPATIENT)
Dept: SURGERY | Facility: HOSPITAL | Age: 43
End: 2018-11-27
Payer: COMMERCIAL

## 2018-11-27 ENCOUNTER — HOSPITAL ENCOUNTER (OUTPATIENT)
Facility: HOSPITAL | Age: 43
Setting detail: HOSPITAL OUTPATIENT SURGERY
Discharge: HOME | End: 2018-11-27
Attending: ORTHOPAEDIC SURGERY | Admitting: ORTHOPAEDIC SURGERY
Payer: COMMERCIAL

## 2018-11-27 ENCOUNTER — ANESTHESIA (OUTPATIENT)
Dept: SURGERY | Facility: HOSPITAL | Age: 43
End: 2018-11-27
Payer: COMMERCIAL

## 2018-11-27 VITALS
HEIGHT: 72 IN | DIASTOLIC BLOOD PRESSURE: 79 MMHG | SYSTOLIC BLOOD PRESSURE: 141 MMHG | WEIGHT: 238.38 LBS | RESPIRATION RATE: 14 BRPM | HEART RATE: 88 BPM | TEMPERATURE: 97 F | OXYGEN SATURATION: 100 % | BODY MASS INDEX: 32.29 KG/M2

## 2018-11-27 DIAGNOSIS — M75.42 IMPINGEMENT SYNDROME OF LEFT SHOULDER: ICD-10-CM

## 2018-11-27 DIAGNOSIS — S43.439A SLAP TEAR OF SHOULDER: Primary | ICD-10-CM

## 2018-11-27 PROCEDURE — 99152 MOD SED SAME PHYS/QHP 5/>YRS: CPT | Performed by: ORTHOPAEDIC SURGERY

## 2018-11-27 PROCEDURE — 64415 NJX AA&/STRD BRCH PLXS IMG: CPT | Performed by: ORTHOPAEDIC SURGERY

## 2018-11-27 PROCEDURE — 0LS24ZZ REPOSITION LEFT SHOULDER TENDON, PERCUTANEOUS ENDOSCOPIC APPROACH: ICD-10-PCS | Performed by: ORTHOPAEDIC SURGERY

## 2018-11-27 PROCEDURE — 3E0T3BZ INTRODUCTION OF ANESTHETIC AGENT INTO PERIPHERAL NERVES AND PLEXI, PERCUTANEOUS APPROACH: ICD-10-PCS | Performed by: ANESTHESIOLOGY

## 2018-11-27 PROCEDURE — 76942 ECHO GUIDE FOR BIOPSY: CPT | Performed by: ORTHOPAEDIC SURGERY

## 2018-11-27 PROCEDURE — 0LQ24ZZ REPAIR LEFT SHOULDER TENDON, PERCUTANEOUS ENDOSCOPIC APPROACH: ICD-10-PCS | Performed by: ORTHOPAEDIC SURGERY

## 2018-11-27 RX ORDER — SODIUM CHLORIDE, SODIUM LACTATE, POTASSIUM CHLORIDE, CALCIUM CHLORIDE 600; 310; 30; 20 MG/100ML; MG/100ML; MG/100ML; MG/100ML
INJECTION, SOLUTION INTRAVENOUS CONTINUOUS
Status: DISCONTINUED | OUTPATIENT
Start: 2018-11-27 | End: 2018-11-27

## 2018-11-27 RX ORDER — ACETAMINOPHEN 500 MG
1000 TABLET ORAL ONCE
Status: COMPLETED | OUTPATIENT
Start: 2018-11-27 | End: 2018-11-27

## 2018-11-27 RX ORDER — FAMOTIDINE 20 MG/1
20 TABLET ORAL ONCE
Status: COMPLETED | OUTPATIENT
Start: 2018-11-27 | End: 2018-11-27

## 2018-11-27 RX ORDER — ONDANSETRON 2 MG/ML
4 INJECTION INTRAMUSCULAR; INTRAVENOUS ONCE AS NEEDED
Status: DISCONTINUED | OUTPATIENT
Start: 2018-11-27 | End: 2018-11-27

## 2018-11-27 RX ORDER — CEFAZOLIN SODIUM/WATER 2 G/20 ML
2 SYRINGE (ML) INTRAVENOUS ONCE
Status: DISCONTINUED | OUTPATIENT
Start: 2018-11-27 | End: 2018-11-27 | Stop reason: HOSPADM

## 2018-11-27 RX ORDER — HYDROCODONE BITARTRATE AND ACETAMINOPHEN 5; 325 MG/1; MG/1
2 TABLET ORAL AS NEEDED
Status: DISCONTINUED | OUTPATIENT
Start: 2018-11-27 | End: 2018-11-27

## 2018-11-27 RX ORDER — NEOSTIGMINE METHYLSULFATE 0.5 MG/ML
INJECTION INTRAVENOUS AS NEEDED
Status: DISCONTINUED | OUTPATIENT
Start: 2018-11-27 | End: 2018-11-27 | Stop reason: SURG

## 2018-11-27 RX ORDER — HYDROCODONE BITARTRATE AND ACETAMINOPHEN 5; 325 MG/1; MG/1
1 TABLET ORAL AS NEEDED
Status: DISCONTINUED | OUTPATIENT
Start: 2018-11-27 | End: 2018-11-27

## 2018-11-27 RX ORDER — ONDANSETRON 2 MG/ML
4 INJECTION INTRAMUSCULAR; INTRAVENOUS EVERY 6 HOURS PRN
Status: CANCELLED | OUTPATIENT
Start: 2018-11-27

## 2018-11-27 RX ORDER — LIDOCAINE HYDROCHLORIDE 10 MG/ML
INJECTION, SOLUTION EPIDURAL; INFILTRATION; INTRACAUDAL; PERINEURAL AS NEEDED
Status: DISCONTINUED | OUTPATIENT
Start: 2018-11-27 | End: 2018-11-27 | Stop reason: SURG

## 2018-11-27 RX ORDER — MORPHINE SULFATE 4 MG/ML
2 INJECTION, SOLUTION INTRAMUSCULAR; INTRAVENOUS EVERY 10 MIN PRN
Status: DISCONTINUED | OUTPATIENT
Start: 2018-11-27 | End: 2018-11-27

## 2018-11-27 RX ORDER — HYDROCODONE BITARTRATE AND ACETAMINOPHEN 10; 325 MG/1; MG/1
1-2 TABLET ORAL EVERY 6 HOURS PRN
Qty: 40 TABLET | Refills: 0 | Status: SHIPPED | OUTPATIENT
Start: 2018-11-27 | End: 2019-02-18

## 2018-11-27 RX ORDER — DEXAMETHASONE SODIUM PHOSPHATE 4 MG/ML
VIAL (ML) INJECTION AS NEEDED
Status: DISCONTINUED | OUTPATIENT
Start: 2018-11-27 | End: 2018-11-27 | Stop reason: SURG

## 2018-11-27 RX ORDER — ONDANSETRON 2 MG/ML
INJECTION INTRAMUSCULAR; INTRAVENOUS AS NEEDED
Status: DISCONTINUED | OUTPATIENT
Start: 2018-11-27 | End: 2018-11-27 | Stop reason: SURG

## 2018-11-27 RX ORDER — GLYCOPYRROLATE 0.2 MG/ML
INJECTION INTRAMUSCULAR; INTRAVENOUS AS NEEDED
Status: DISCONTINUED | OUTPATIENT
Start: 2018-11-27 | End: 2018-11-27 | Stop reason: SURG

## 2018-11-27 RX ORDER — ROCURONIUM BROMIDE 10 MG/ML
INJECTION, SOLUTION INTRAVENOUS AS NEEDED
Status: DISCONTINUED | OUTPATIENT
Start: 2018-11-27 | End: 2018-11-27 | Stop reason: SURG

## 2018-11-27 RX ORDER — NALOXONE HYDROCHLORIDE 0.4 MG/ML
80 INJECTION, SOLUTION INTRAMUSCULAR; INTRAVENOUS; SUBCUTANEOUS AS NEEDED
Status: DISCONTINUED | OUTPATIENT
Start: 2018-11-27 | End: 2018-11-27

## 2018-11-27 RX ORDER — METOCLOPRAMIDE 10 MG/1
10 TABLET ORAL ONCE
Status: COMPLETED | OUTPATIENT
Start: 2018-11-27 | End: 2018-11-27

## 2018-11-27 RX ORDER — MORPHINE SULFATE 10 MG/ML
6 INJECTION, SOLUTION INTRAMUSCULAR; INTRAVENOUS EVERY 10 MIN PRN
Status: DISCONTINUED | OUTPATIENT
Start: 2018-11-27 | End: 2018-11-27

## 2018-11-27 RX ORDER — CEFAZOLIN SODIUM 1 G/3ML
INJECTION, POWDER, FOR SOLUTION INTRAMUSCULAR; INTRAVENOUS AS NEEDED
Status: DISCONTINUED | OUTPATIENT
Start: 2018-11-27 | End: 2018-11-27 | Stop reason: SURG

## 2018-11-27 RX ORDER — MORPHINE SULFATE 4 MG/ML
4 INJECTION, SOLUTION INTRAMUSCULAR; INTRAVENOUS EVERY 10 MIN PRN
Status: DISCONTINUED | OUTPATIENT
Start: 2018-11-27 | End: 2018-11-27

## 2018-11-27 RX ADMIN — ROCURONIUM BROMIDE 20 MG: 10 INJECTION, SOLUTION INTRAVENOUS at 10:23:00

## 2018-11-27 RX ADMIN — NEOSTIGMINE METHYLSULFATE 5 MG: 0.5 INJECTION INTRAVENOUS at 11:42:00

## 2018-11-27 RX ADMIN — ROCURONIUM BROMIDE 40 MG: 10 INJECTION, SOLUTION INTRAVENOUS at 10:05:00

## 2018-11-27 RX ADMIN — ONDANSETRON 4 MG: 2 INJECTION INTRAMUSCULAR; INTRAVENOUS at 10:48:00

## 2018-11-27 RX ADMIN — DEXAMETHASONE SODIUM PHOSPHATE 4 MG: 4 MG/ML VIAL (ML) INJECTION at 10:48:00

## 2018-11-27 RX ADMIN — LIDOCAINE HYDROCHLORIDE 50 MG: 10 INJECTION, SOLUTION EPIDURAL; INFILTRATION; INTRACAUDAL; PERINEURAL at 10:02:00

## 2018-11-27 RX ADMIN — SODIUM CHLORIDE, SODIUM LACTATE, POTASSIUM CHLORIDE, CALCIUM CHLORIDE: 600; 310; 30; 20 INJECTION, SOLUTION INTRAVENOUS at 11:57:00

## 2018-11-27 RX ADMIN — SODIUM CHLORIDE, SODIUM LACTATE, POTASSIUM CHLORIDE, CALCIUM CHLORIDE: 600; 310; 30; 20 INJECTION, SOLUTION INTRAVENOUS at 10:00:00

## 2018-11-27 RX ADMIN — CEFAZOLIN SODIUM 2 G: 1 INJECTION, POWDER, FOR SOLUTION INTRAMUSCULAR; INTRAVENOUS at 10:15:00

## 2018-11-27 RX ADMIN — GLYCOPYRROLATE 1 MG: 0.2 INJECTION INTRAMUSCULAR; INTRAVENOUS at 11:42:00

## 2018-11-27 NOTE — BRIEF OP NOTE
Pre-Operative Diagnosis: Impingement syndrome of left shoulder [M75.42]     Post-Operative Diagnosis: left shoulder SLAP tear     Procedure Performed:   Procedure(s):  LEFT SHOULDER ARTHROSCOPY, BICEPS TENODESIS     Surgeon(s) and Role:     * JANETT Del Castillo

## 2018-11-27 NOTE — OPERATIVE REPORT
[facility]    PATIENT'S NAME: Bayleemarcelo Samteja   ATTENDING PHYSICIAN: Marck Connelly MD   OPERATING PHYSICIAN: Marck Connelly MD   PATIENT ACCOUNT#:   [id number]    LOCATION:  [facility] PACU 5 [facility] 10  MEDICAL RECORD #:   [id number]       D anteriorly to the equator posteriorly of the glenoid. There was no significant articular cartilage damage. There was mild bursal articular-sided tearing of the supraspinatus. Subscapularis was intact.   No loose bodies were seen in the subcoracoid recess partial articular-sided tearing which we debrided. It was involving much less than 50% of the footprint. Through the superior rotator cuff, we passed a spinal needle and a passing suture.   We took each limb of the tagged biceps out through separate stabs

## 2018-11-27 NOTE — ANESTHESIA PROCEDURE NOTES
ANESTHESIA INTUBATION  Urgency: elective      General Information and Staff    Patient location during procedure: OR  Anesthesiologist: Marliss Hodgkin, MD    Indications and Patient Condition  Indications for airway management: anesthesia  Sedation level

## 2018-11-27 NOTE — ANESTHESIA PREPROCEDURE EVALUATION
Anesthesia PreOp Note    HPI:     Bernie Ly is a 39year old male who presents for preoperative consultation requested by:  Herman Hernandez MD    Date of Surgery: 11/27/2018    Procedure(s):  SHOULDER ARTHROSCOPY ROTATOR CUFF REPAIR  Indication needed.  Disp: 40 tablet Rfl: 0       No Known Allergies    Family History   Problem Relation Age of Onset   • Hypertension Father    • Cancer Father         skin   • Other (Other) Father    • Diabetes Neg    • Glaucoma Neg      Social History    Socioecono exam  (+) hypertension,     Neuro/Psych - negative ROS     GI/Hepatic/Renal - negative ROS     Endo/Other - negative ROS   Abdominal  - normal exam  (+) obese,              Anesthesia Plan:   ASA:  2  Plan:   General and regional  Post-op Pain Management:

## 2018-11-27 NOTE — ANESTHESIA POSTPROCEDURE EVALUATION
Patient: Karen Loyd    Procedure Summary     Date:  11/27/18 Room / Location:  [address] [place] [address] OR 16 / [address] [place] [address] OR    Anesthesia Start:  1000 Anesthesia Stop:  8372    Procedure:  SHOULDER ARTHROSCOPY ROTATOR CUFF REPAIR (Left Shoulder) Diagnosis:       Im

## 2018-11-27 NOTE — ANESTHESIA PROCEDURE NOTES
Peripheral Block    Anesthesiologist:  Liana Herman MD  Patient Location:  PACU  Site Identification: ultrasound guided, real time ultrasound guided and IMAGE STORED AND RETRIEVABLE    Reason for Block: at surgeon's request and post-op pain management

## 2018-11-27 NOTE — H&P
Chief Complaint: Left shoulder pain        History of present illness: This 39year old male has left shoulder pain that has been ongoing for several months.   The pain is made worse with overhead activity and is relieved by rest.  The patient has not felt syndrome, impingement     Plan:      Patient continues to have significant pain that is affecting his ability to sleep at night and perform physical activities.   He tried a cortisone injection and has also rested and given the several months to get better

## 2018-11-28 ENCOUNTER — PATIENT MESSAGE (OUTPATIENT)
Dept: ORTHOPEDICS CLINIC | Facility: CLINIC | Age: 43
End: 2018-11-28

## 2018-11-28 ENCOUNTER — NURSE ONLY (OUTPATIENT)
Dept: ALLERGY | Facility: CLINIC | Age: 43
End: 2018-11-28
Payer: COMMERCIAL

## 2018-11-28 DIAGNOSIS — J30.89 ENVIRONMENTAL AND SEASONAL ALLERGIES: ICD-10-CM

## 2018-11-28 PROCEDURE — 95117 IMMUNOTHERAPY INJECTIONS: CPT | Performed by: ALLERGY & IMMUNOLOGY

## 2018-11-28 PROCEDURE — 95165 ANTIGEN THERAPY SERVICES: CPT | Performed by: ALLERGY & IMMUNOLOGY

## 2018-11-28 NOTE — TELEPHONE ENCOUNTER
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

## 2018-12-03 DIAGNOSIS — I10 ESSENTIAL HYPERTENSION: ICD-10-CM

## 2018-12-03 RX ORDER — LISINOPRIL AND HYDROCHLOROTHIAZIDE 12.5; 1 MG/1; MG/1
1 TABLET ORAL DAILY
Qty: 90 TABLET | Refills: 2 | Status: SHIPPED | OUTPATIENT
Start: 2018-12-03 | End: 2019-07-31

## 2018-12-06 ENCOUNTER — NURSE ONLY (OUTPATIENT)
Dept: ALLERGY | Facility: CLINIC | Age: 43
End: 2018-12-06
Payer: COMMERCIAL

## 2018-12-06 DIAGNOSIS — J30.1 ALLERGIC RHINITIS DUE TO POLLEN, UNSPECIFIED SEASONALITY: ICD-10-CM

## 2018-12-06 PROCEDURE — 95117 IMMUNOTHERAPY INJECTIONS: CPT | Performed by: ALLERGY & IMMUNOLOGY

## 2018-12-06 PROCEDURE — 95165 ANTIGEN THERAPY SERVICES: CPT | Performed by: ALLERGY & IMMUNOLOGY

## 2018-12-12 ENCOUNTER — OFFICE VISIT (OUTPATIENT)
Dept: ORTHOPEDICS CLINIC | Facility: CLINIC | Age: 43
End: 2018-12-12
Payer: COMMERCIAL

## 2018-12-12 DIAGNOSIS — S43.432A SUPERIOR GLENOID LABRUM LESION OF LEFT SHOULDER, INITIAL ENCOUNTER: Primary | ICD-10-CM

## 2018-12-12 DIAGNOSIS — M75.122 COMPLETE TEAR OF LEFT ROTATOR CUFF: ICD-10-CM

## 2018-12-12 PROCEDURE — 99024 POSTOP FOLLOW-UP VISIT: CPT | Performed by: ORTHOPAEDIC SURGERY

## 2018-12-12 PROCEDURE — 99212 OFFICE O/P EST SF 10 MIN: CPT | Performed by: ORTHOPAEDIC SURGERY

## 2018-12-12 NOTE — PROGRESS NOTES
NURSING INTAKE COMMENTS: Patient presents with:  Post-Op: S/P left shoulder arthroscopy -- Sx on 11/27/18. Rates pain 4/10 and worse at night and in AM. Denies any calf pain or fever. Denies numbness or tingling in LUE. Right handed.        Patient present Comment: Socially      Drug use: No        Physical examination reveals well healed incisions. Range of motion is appropriate. The operative extremity is neurovascularly intact with no focal deficits.     Imaging: matias Dennis was seen today for po

## 2018-12-13 ENCOUNTER — NURSE ONLY (OUTPATIENT)
Dept: ALLERGY | Facility: CLINIC | Age: 43
End: 2018-12-13
Payer: COMMERCIAL

## 2018-12-13 DIAGNOSIS — J30.89 ENVIRONMENTAL AND SEASONAL ALLERGIES: ICD-10-CM

## 2018-12-13 PROCEDURE — 95117 IMMUNOTHERAPY INJECTIONS: CPT | Performed by: FAMILY MEDICINE

## 2018-12-14 ENCOUNTER — APPOINTMENT (OUTPATIENT)
Dept: PHYSICAL THERAPY | Age: 43
End: 2018-12-14
Attending: ORTHOPAEDIC SURGERY
Payer: COMMERCIAL

## 2018-12-14 ENCOUNTER — OFFICE VISIT (OUTPATIENT)
Dept: PHYSICAL THERAPY | Facility: HOSPITAL | Age: 43
End: 2018-12-14
Attending: ORTHOPAEDIC SURGERY
Payer: COMMERCIAL

## 2018-12-14 DIAGNOSIS — M75.122 COMPLETE TEAR OF LEFT ROTATOR CUFF: ICD-10-CM

## 2018-12-14 DIAGNOSIS — S43.432A SUPERIOR GLENOID LABRUM LESION OF LEFT SHOULDER, INITIAL ENCOUNTER: ICD-10-CM

## 2018-12-14 PROCEDURE — 97530 THERAPEUTIC ACTIVITIES: CPT

## 2018-12-14 PROCEDURE — 97162 PT EVAL MOD COMPLEX 30 MIN: CPT

## 2018-12-14 NOTE — PROGRESS NOTES
UPPER EXTREMITY EVALUATION:   Referring Physician: Dr. Christian Marino   Diagnosis: Superior glenoid labrum lesion of left shoulder, initial encounter (W74.709L); Complete tear of left rotator cuff (Z79.305)       Date of Onset: s/p repair 11/27/18 Date of Se for an infant and moderate lifting       Precautions: None     OBJECTIVE:   Observation/Posture: rounded shoulders   Cervical Screen: WNL   Palpation: arthroscopic incisions healing well   Sensation: WNL  Scapulohumeral Rhythm: unable to assess at this nirav Therapy; Therapeutic Exercises; Neuromuscular Re-education; Therapeutic Activity; Electrical Stim; Ultrasound;  Patient education; Home exercise program instruction    Education or treatment limitation: None  Rehab Potential: good    FOTO: 40/100 (predicted

## 2018-12-18 ENCOUNTER — APPOINTMENT (OUTPATIENT)
Dept: PHYSICAL THERAPY | Age: 43
End: 2018-12-18
Attending: ORTHOPAEDIC SURGERY
Payer: COMMERCIAL

## 2018-12-19 ENCOUNTER — OFFICE VISIT (OUTPATIENT)
Dept: PHYSICAL THERAPY | Facility: HOSPITAL | Age: 43
End: 2018-12-19
Attending: ORTHOPAEDIC SURGERY
Payer: COMMERCIAL

## 2018-12-19 PROCEDURE — 97110 THERAPEUTIC EXERCISES: CPT

## 2018-12-19 PROCEDURE — 97140 MANUAL THERAPY 1/> REGIONS: CPT

## 2018-12-19 NOTE — PROGRESS NOTES
Diagnosis:  Superior glenoid labrum lesion of left shoulder, initial encounter (S79.791F); Complete tear of left rotator cuff (M75.122)       Authorized # of Visits: (per eval 12 wks)    Insurance: Aixa Zepeda EPO/PPO            Next MD visit: January 9, 2018 demonstrate proper lifting > 25 lbs necessary to hold, carry and care for an infant without limitation     5) The patient will report a 50-75% improvement in ability to lay flat and sleep without awakening secondary to pain       Plan:   Pain management; i

## 2018-12-20 ENCOUNTER — NURSE ONLY (OUTPATIENT)
Dept: ALLERGY | Facility: CLINIC | Age: 43
End: 2018-12-20
Payer: COMMERCIAL

## 2018-12-20 DIAGNOSIS — J30.89 ENVIRONMENTAL AND SEASONAL ALLERGIES: ICD-10-CM

## 2018-12-20 PROCEDURE — 95117 IMMUNOTHERAPY INJECTIONS: CPT | Performed by: ALLERGY & IMMUNOLOGY

## 2018-12-21 ENCOUNTER — APPOINTMENT (OUTPATIENT)
Dept: PHYSICAL THERAPY | Age: 43
End: 2018-12-21
Attending: ORTHOPAEDIC SURGERY
Payer: COMMERCIAL

## 2018-12-21 ENCOUNTER — APPOINTMENT (OUTPATIENT)
Dept: PHYSICAL THERAPY | Facility: HOSPITAL | Age: 43
End: 2018-12-21
Attending: ORTHOPAEDIC SURGERY
Payer: COMMERCIAL

## 2018-12-28 ENCOUNTER — OFFICE VISIT (OUTPATIENT)
Dept: PHYSICAL THERAPY | Facility: HOSPITAL | Age: 43
End: 2018-12-28
Attending: ORTHOPAEDIC SURGERY
Payer: COMMERCIAL

## 2018-12-28 ENCOUNTER — APPOINTMENT (OUTPATIENT)
Dept: PHYSICAL THERAPY | Age: 43
End: 2018-12-28
Attending: ORTHOPAEDIC SURGERY
Payer: COMMERCIAL

## 2018-12-28 PROCEDURE — 97110 THERAPEUTIC EXERCISES: CPT

## 2018-12-28 PROCEDURE — 97140 MANUAL THERAPY 1/> REGIONS: CPT

## 2018-12-28 NOTE — PROGRESS NOTES
Diagnosis:  Superior glenoid labrum lesion of left shoulder, initial encounter (Z01.825J); Complete tear of left rotator cuff (M75.122)       Authorized # of Visits: (per eval 12 wks)    Insurance: Moi Cardenas EPO/PPO            Next MD visit: January 9, 2018 necessary to lift an infant from the floor or chair without limitation      3) The patient will demonstrate > 165 deg L shoulder elevation necessary to perform light to moderate household chores and yard work without limitation        4) The patient will d

## 2018-12-31 ENCOUNTER — NURSE ONLY (OUTPATIENT)
Dept: ALLERGY | Facility: CLINIC | Age: 43
End: 2018-12-31
Payer: COMMERCIAL

## 2018-12-31 DIAGNOSIS — J30.89 ENVIRONMENTAL AND SEASONAL ALLERGIES: ICD-10-CM

## 2018-12-31 PROCEDURE — 95165 ANTIGEN THERAPY SERVICES: CPT | Performed by: ALLERGY & IMMUNOLOGY

## 2018-12-31 PROCEDURE — 95117 IMMUNOTHERAPY INJECTIONS: CPT | Performed by: ALLERGY & IMMUNOLOGY

## 2019-01-02 ENCOUNTER — OFFICE VISIT (OUTPATIENT)
Dept: PHYSICAL THERAPY | Age: 44
End: 2019-01-02
Attending: ORTHOPAEDIC SURGERY
Payer: COMMERCIAL

## 2019-01-02 PROCEDURE — 97110 THERAPEUTIC EXERCISES: CPT | Performed by: PHYSICAL THERAPIST

## 2019-01-02 NOTE — PROGRESS NOTES
Diagnosis:  Superior glenoid labrum lesion of left shoulder, initial encounter (X21.710O); Complete tear of left rotator cuff (M75.122)       Authorized # of Visits: (per eval 12 wks)    Insurance: Geneva Tracy EPO/PPO            Next MD visit: January 9, 2018 exercises and pendulum exercises for home. He will be re-assessed next session if he can progress his HEP for the weekend.   Patient still uses the (L) sling.     Goals:  1) The patient will be safe and independent with a progressive HEP necessary to mange

## 2019-01-04 ENCOUNTER — OFFICE VISIT (OUTPATIENT)
Dept: PHYSICAL THERAPY | Age: 44
End: 2019-01-04
Attending: ORTHOPAEDIC SURGERY
Payer: COMMERCIAL

## 2019-01-04 PROCEDURE — 97110 THERAPEUTIC EXERCISES: CPT | Performed by: PHYSICAL THERAPIST

## 2019-01-04 NOTE — PROGRESS NOTES
Diagnosis:  Superior glenoid labrum lesion of left shoulder, initial encounter (F78.240O); Complete tear of left rotator cuff (E52.444)   Surgery: 11/27/18      Authorized # of Visits: (per eval 12 wks)    Insurance: Joseluis Stone EPO/PPO            Next MD visit 90 degs 2 lbs x 20 reps    Supine: (L) UE punches @ 90 degs 2 lbs x 20 reps    Supine: (L) UE s.punch (1 step) 2 lbs x 20 reps    SLY: (L) UE ER (midrange) 1 lb x 20 reps    SLY: (L) UE abd to 30 degs 0 wt x 20 reps    Prone: (L) UE n.row 2 lbs x 20 reps protocol. Charges:  TherEx x 3      Total Timed Treatment: 45 min       TotalTreatment Time: 47 min

## 2019-01-04 NOTE — PROGRESS NOTES
Diagnosis:  Superior glenoid labrum lesion of left shoulder, initial encounter (I60.176B); Complete tear of left rotator cuff (Y69.623)   Surgery: 11/27/18      Authorized # of Visits: (per eval 12 wks)    Insurance: Lianne Simeon EPO/PPO            Next MD visit pain       Plan:   Continue to progress through his protocol.

## 2019-01-07 ENCOUNTER — OFFICE VISIT (OUTPATIENT)
Dept: ORTHOPEDICS CLINIC | Facility: CLINIC | Age: 44
End: 2019-01-07
Payer: COMMERCIAL

## 2019-01-07 DIAGNOSIS — S43.432A SUPERIOR GLENOID LABRUM LESION OF LEFT SHOULDER, INITIAL ENCOUNTER: Primary | ICD-10-CM

## 2019-01-07 PROCEDURE — 99024 POSTOP FOLLOW-UP VISIT: CPT | Performed by: ORTHOPAEDIC SURGERY

## 2019-01-07 PROCEDURE — 99212 OFFICE O/P EST SF 10 MIN: CPT | Performed by: ORTHOPAEDIC SURGERY

## 2019-01-07 NOTE — PROGRESS NOTES
NURSING INTAKE COMMENTS: Patient presents with:  Post-Op: s/p L shoulder arthroscopy 6 weeks f/u - had sx on 11/27/18 - states he is good, no pain and no c/o       Patient presents 6 weeks status post left arthroscopic biceps tenodesis.   The patient report examination reveals well healed incisions. Range of motion is appropriate. The operative extremity is neurovascularly intact with no focal deficits. Imaging: matias Dennis was seen today for post-op.     Diagnoses and all orders for this visit:    Kate

## 2019-01-08 ENCOUNTER — OFFICE VISIT (OUTPATIENT)
Dept: PHYSICAL THERAPY | Age: 44
End: 2019-01-08
Attending: ORTHOPAEDIC SURGERY
Payer: COMMERCIAL

## 2019-01-08 PROCEDURE — 97110 THERAPEUTIC EXERCISES: CPT | Performed by: PHYSICAL THERAPIST

## 2019-01-08 NOTE — PROGRESS NOTES
Diagnosis:  Superior glenoid labrum lesion of left shoulder, initial encounter (Q17.923Z); Complete tear of left rotator cuff (U81.942)   Surgery: 11/27/18      Authorized # of Visits: (per eval 12 wks)    Insurance: [employer] EPO/PPO            Next MD visit (L) UE flexion to 90 degs 2 lbs x 20 reps    Supine: (L) UE punches @ 90 degs 2 lbs x 20 reps    Supine: (L) UE s.punch (1 step) 2 lbs x 20 reps    SLY: (L) UE ER (midrange) 1 lb x 20 reps    SLY: (L) UE abd to 30 degs 0 wt x 20 reps    Prone: (L) UE n.row without limitation     5) The patient will report a 50-75% improvement in ability to lay flat and sleep without awakening secondary to pain       Plan:   Continue to progress through his protocol. Charges:  TherEx x 3      Total Timed Treatment: 50 min

## 2019-01-09 ENCOUNTER — NURSE ONLY (OUTPATIENT)
Dept: ALLERGY | Facility: CLINIC | Age: 44
End: 2019-01-09
Payer: COMMERCIAL

## 2019-01-09 DIAGNOSIS — J30.89 ENVIRONMENTAL AND SEASONAL ALLERGIES: ICD-10-CM

## 2019-01-09 PROCEDURE — 95117 IMMUNOTHERAPY INJECTIONS: CPT | Performed by: ALLERGY & IMMUNOLOGY

## 2019-01-09 PROCEDURE — 95165 ANTIGEN THERAPY SERVICES: CPT | Performed by: ALLERGY & IMMUNOLOGY

## 2019-01-10 ENCOUNTER — OFFICE VISIT (OUTPATIENT)
Dept: PHYSICAL THERAPY | Age: 44
End: 2019-01-10
Attending: ORTHOPAEDIC SURGERY
Payer: COMMERCIAL

## 2019-01-10 PROCEDURE — 97110 THERAPEUTIC EXERCISES: CPT | Performed by: PHYSICAL THERAPIST

## 2019-01-10 NOTE — PROGRESS NOTES
Diagnosis:  Superior glenoid labrum lesion of left shoulder, initial encounter (N24.403Y); Complete tear of left rotator cuff (D71.587)   Surgery: 11/27/18      Authorized # of Visits: (per eval 12 wks)    Insurance: Tomeka Kelley EPO/PPO            Next MD visit only L5 x 8 mins    Supine: (L) UE PROM all directions with oscillations    Supine: (L) UE flexion to 90 degs 2 lbs x 20 reps    Supine: (L) UE punches @ 90 degs 2 lbs x 20 reps    Supine: (L) UE s.punch (1 step) 2 lbs x 20 reps    SLY: (L) UE ER (midrange exercises, and dynamic postural exercises without production of pain. No compensatory movements noticed during exercises. No pain in the (L) shoulder just weakness and fatigue.       Goals:  1) The patient will be safe and independent with a progressive H

## 2019-01-11 ENCOUNTER — APPOINTMENT (OUTPATIENT)
Dept: PHYSICAL THERAPY | Age: 44
End: 2019-01-11
Attending: ORTHOPAEDIC SURGERY
Payer: COMMERCIAL

## 2019-01-15 ENCOUNTER — OFFICE VISIT (OUTPATIENT)
Dept: PHYSICAL THERAPY | Age: 44
End: 2019-01-15
Attending: ORTHOPAEDIC SURGERY
Payer: COMMERCIAL

## 2019-01-15 PROCEDURE — 97110 THERAPEUTIC EXERCISES: CPT | Performed by: PHYSICAL THERAPIST

## 2019-01-15 NOTE — PROGRESS NOTES
Diagnosis:  Superior glenoid labrum lesion of left shoulder, initial encounter (V75.644W); Complete tear of left rotator cuff (I01.928)   Surgery: 11/27/18      Authorized # of Visits: (per eval 12 wks)    Insurance: Miguelangel Montoya EPO/PPO            Next MD visit with oscillations    Supine: (L) UE flexion to 90 degs 2 lbs x 20 reps    Supine: (L) UE punches @ 90 degs 2 lbs x 20 reps    Supine: (L) UE s.punch (1 step) 2 lbs x 20 reps    SLY: (L) UE ER (midrange) 1 lb x 20 reps    SLY: (L) UE abd to 30 degs 0 wt x 2 scaption 2 lbs 2 x 20 reps    Seated: (L) UE ER @ 90/90 with redTB 2 x 20 reps    (L) UE scaption rhytmic stab with 1 kg ball 5 sets x 10 reps CW/CCW    (B) UE greenTB n.row, extension, w.row 10 reps x 10 cts ea     (L) UE ER/IR redTB 2 x 20 reps    Wall p

## 2019-01-17 ENCOUNTER — NURSE ONLY (OUTPATIENT)
Dept: ALLERGY | Facility: CLINIC | Age: 44
End: 2019-01-17
Payer: COMMERCIAL

## 2019-01-17 DIAGNOSIS — J30.89 ENVIRONMENTAL AND SEASONAL ALLERGIES: ICD-10-CM

## 2019-01-17 PROCEDURE — 95117 IMMUNOTHERAPY INJECTIONS: CPT | Performed by: ALLERGY & IMMUNOLOGY

## 2019-01-18 ENCOUNTER — OFFICE VISIT (OUTPATIENT)
Dept: PHYSICAL THERAPY | Age: 44
End: 2019-01-18
Attending: ORTHOPAEDIC SURGERY
Payer: COMMERCIAL

## 2019-01-18 PROCEDURE — 97110 THERAPEUTIC EXERCISES: CPT | Performed by: PHYSICAL THERAPIST

## 2019-01-18 NOTE — PROGRESS NOTES
Diagnosis:  Superior glenoid labrum lesion of left shoulder, initial encounter (B71.951Z); Complete tear of left rotator cuff (A57.926)   Surgery: 11/27/18      Authorized # of Visits: (per eval 12 wks)    Insurance: [employer] EPO/PPO            Next MD visit oscillations    Supine: (L) UE flexion to 90 degs 2 lbs x 20 reps    Supine: (L) UE punches @ 90 degs 2 lbs x 20 reps    Supine: (L) UE s.punch (1 step) 2 lbs x 20 reps    SLY: (L) UE ER (midrange) 1 lb x 20 reps    SLY: (L) UE abd to 30 degs 0 wt x 20 rep scaption 2 lbs 2 x 20 reps    Seated: (L) UE ER @ 90/90 with redTB 2 x 20 reps    (L) UE scaption rhytmic stab with 1 kg ball 5 sets x 10 reps CW/CCW    (B) UE greenTB n.row, extension, w.row 10 reps x 10 cts ea     (L) UE ER/IR redTB 2 x 20 reps    Wall p Charges:  TherEx x 3      Total Timed Treatment: 49 mins     TotalTreatment Time: 50 mins

## 2019-01-22 ENCOUNTER — OFFICE VISIT (OUTPATIENT)
Dept: PHYSICAL THERAPY | Age: 44
End: 2019-01-22
Attending: ORTHOPAEDIC SURGERY
Payer: COMMERCIAL

## 2019-01-22 PROCEDURE — 97110 THERAPEUTIC EXERCISES: CPT | Performed by: PHYSICAL THERAPIST

## 2019-01-22 NOTE — PROGRESS NOTES
Diagnosis:  Superior glenoid labrum lesion of left shoulder, initial encounter (G30.503H); Complete tear of left rotator cuff (A05.785)   Surgery: 11/27/18      Authorized # of Visits: (per eval 12 wks)    Insurance: [employer] EPO/PPO            Next MD visit flexion/extension/abduction/ER/IR 10 reps x 10 cts ea NuStep UE only L5 x 8 mins    Supine: (L) UE PROM all directions with oscillations    Supine: (L) UE flexion to 90 degs 2 lbs x 20 reps    Supine: (L) UE punches @ 90 degs 2 lbs x 20 reps    Supine: (L) 20 reps    Prone: (L) UE h. Abduction 3 lbs 2 x 20 reps    Prone: (L) UE rhomboid 1 lb 2 x 20 reps    Prone: (L) UE scaption 2 lbs 2 x 20 reps    Seated: (L) UE ER @ 90/90 with redTB 2 x 20 reps    (L) UE scaption rhytmic stab with 1 kg ball 5 sets x 10 re symptoms during ADLs (I.e. UE dressing)      2) The patient will demonstrate at least 4/5 MMT L shoulder flexion/abduction necessary to lift an infant from the floor or chair without limitation      3) The patient will demonstrate > 165 deg L shoulder elev

## 2019-01-24 ENCOUNTER — NURSE ONLY (OUTPATIENT)
Dept: ALLERGY | Facility: CLINIC | Age: 44
End: 2019-01-24
Payer: COMMERCIAL

## 2019-01-24 DIAGNOSIS — J30.1 ALLERGIC RHINITIS DUE TO POLLEN, UNSPECIFIED SEASONALITY: ICD-10-CM

## 2019-01-24 PROCEDURE — 95117 IMMUNOTHERAPY INJECTIONS: CPT | Performed by: ALLERGY & IMMUNOLOGY

## 2019-01-25 ENCOUNTER — OFFICE VISIT (OUTPATIENT)
Dept: PHYSICAL THERAPY | Age: 44
End: 2019-01-25
Attending: ORTHOPAEDIC SURGERY
Payer: COMMERCIAL

## 2019-01-25 PROCEDURE — 97110 THERAPEUTIC EXERCISES: CPT | Performed by: PHYSICAL THERAPIST

## 2019-01-25 NOTE — PROGRESS NOTES
Diagnosis:  Superior glenoid labrum lesion of left shoulder, initial encounter (S20.154A); Complete tear of left rotator cuff (B08.633)   Surgery: 11/27/18      Authorized # of Visits: (per eval 12 wks)    Insurance: [employer] EPO/PPO            Next MD visit mins    Supine: (L) UE PROM all directions with oscillations    Supine: (L) UE flexion to 90 degs 2 lbs x 20 reps    Supine: (L) UE punches @ 90 degs 2 lbs x 20 reps    Supine: (L) UE s.punch (1 step) 2 lbs x 20 reps    SLY: (L) UE ER (midrange) 1 lb x 20 rhomboid 1 lb 2 x 20 reps    Prone: (L) UE scaption 2 lbs 2 x 20 reps    Seated: (L) UE ER @ 90/90 with redTB 2 x 20 reps    (L) UE scaption rhytmic stab with 1 kg ball 5 sets x 10 reps CW/CCW    (B) UE greenTB n.row, extension, w.row 10 reps x 10 cts ea 5 reps x 20 secs ea                  Assessment: Yola Choi was progressed to overhead exercises, plyometric, and functional movement pattern. No pain was produced but had fatigue in the (L) shoulder at the end of his session.    No (L) scapula compensatory m

## 2019-01-29 ENCOUNTER — OFFICE VISIT (OUTPATIENT)
Dept: PHYSICAL THERAPY | Age: 44
End: 2019-01-29
Attending: ORTHOPAEDIC SURGERY
Payer: COMMERCIAL

## 2019-01-29 ENCOUNTER — TELEPHONE (OUTPATIENT)
Dept: ALLERGY | Facility: CLINIC | Age: 44
End: 2019-01-29

## 2019-01-29 PROCEDURE — 97110 THERAPEUTIC EXERCISES: CPT | Performed by: PHYSICAL THERAPIST

## 2019-01-29 NOTE — TELEPHONE ENCOUNTER
LM notifying patient we will be opening later on Thursday due to weather. We will be giving allergy injections between 1 pm and 4pm. Closed tomorrow.

## 2019-01-29 NOTE — PROGRESS NOTES
Diagnosis:  Superior glenoid labrum lesion of left shoulder, initial encounter (T87.824T); Complete tear of left rotator cuff (D92.035)   Surgery: 11/27/18      Authorized # of Visits: (per zeyad 12 wks)    Insurance: Brie M[employer] EPO/PPO            Next MD visit abd to 45 degs x 20 reps    Standing (L) UE isometric on wall using pillow: flexion/extension/abduction/ER/IR 10 reps x 10 cts ea NuStep UE only L5 x 8 mins    Supine: (L) UE PROM all directions with oscillations    Supine: (L) UE flexion to 90 degs 2 lbs fwd/bwkd    Prone: (L) UE n.row 6 lbs 2 x 20 reps    Prone: (L) UE extension 5 lbs 2 x 20 reps    Prone: (L) UE h. Abduction 3 lbs 2 x 20 reps    Prone: (L) UE rhomboid 1 lb 2 x 20 reps    Prone: (L) UE scaption 2 lbs 2 x 20 reps    Seated: (L) UE ER @ 90/ 20 reps ea    (R/L) UE D1D2 Flex/Ext blueTB 2 x 20 reps ea    Prone: (L) UE plyocatch 1 kg ball in scaption release/catch 5 sets x 20 reps    (L) UE OH 4 lb ball bounce 5 reps x 20 secs ea Scifit UE only L9 4 mins fwd/bwkd    (B) UE blueTB n.row, extension awakening secondary to pain       Plan:   Continue with physical therapy for 8 more visits to continue to progress through his protocol. Charges:  TherEx x 4      Total Timed Treatment: 53 mins     TotalTreatment Time: 54 mins

## 2019-02-01 ENCOUNTER — APPOINTMENT (OUTPATIENT)
Dept: PHYSICAL THERAPY | Age: 44
End: 2019-02-01
Attending: ORTHOPAEDIC SURGERY
Payer: COMMERCIAL

## 2019-02-06 ENCOUNTER — OFFICE VISIT (OUTPATIENT)
Dept: PHYSICAL THERAPY | Age: 44
End: 2019-02-06
Attending: ORTHOPAEDIC SURGERY
Payer: COMMERCIAL

## 2019-02-06 PROCEDURE — 97110 THERAPEUTIC EXERCISES: CPT | Performed by: PHYSICAL THERAPIST

## 2019-02-06 NOTE — PROGRESS NOTES
Diagnosis:  Superior glenoid labrum lesion of left shoulder, initial encounter (G83.017G); Complete tear of left rotator cuff (J70.219)   Surgery: 11/27/18      Authorized # of Visits: (per eval 12 wks)    Insurance: Alexander Blackwell EPO/PPO            Next MD visit flexion/extension/abduction/ER/IR 10 reps x 10 cts ea NuStep UE only L5 x 8 mins    Supine: (L) UE PROM all directions with oscillations    Supine: (L) UE flexion to 90 degs 2 lbs x 20 reps    Supine: (L) UE punches @ 90 degs 2 lbs x 20 reps    Supine: (L) 20 reps    Prone: (L) UE h. Abduction 3 lbs 2 x 20 reps    Prone: (L) UE rhomboid 1 lb 2 x 20 reps    Prone: (L) UE scaption 2 lbs 2 x 20 reps    Seated: (L) UE ER @ 90/90 with redTB 2 x 20 reps    (L) UE scaption rhytmic stab with 1 kg ball 5 sets x 10 re ea    Prone: (L) UE plyocatch 1 kg ball in scaption release/catch 5 sets x 20 reps    (L) UE OH 4 lb ball bounce 5 reps x 20 secs ea Scifit UE only L9 4 mins fwd/bwkd    (B) UE blueTB n.row, extension, w.row 10 reps x 10 cts    (B) UE ER @ 90/90 greenTB 5 limitation        4) The patient will demonstrate proper lifting > 25 lbs necessary to hold, carry and care for an infant without limitation     5) The patient will report a 50-75% improvement in ability to lay flat and sleep without awakening secondary to

## 2019-02-07 ENCOUNTER — NURSE ONLY (OUTPATIENT)
Dept: ALLERGY | Facility: CLINIC | Age: 44
End: 2019-02-07
Payer: COMMERCIAL

## 2019-02-07 DIAGNOSIS — J30.89 ENVIRONMENTAL AND SEASONAL ALLERGIES: ICD-10-CM

## 2019-02-07 PROCEDURE — 95117 IMMUNOTHERAPY INJECTIONS: CPT | Performed by: ALLERGY & IMMUNOLOGY

## 2019-02-08 ENCOUNTER — OFFICE VISIT (OUTPATIENT)
Dept: PHYSICAL THERAPY | Age: 44
End: 2019-02-08
Attending: ORTHOPAEDIC SURGERY
Payer: COMMERCIAL

## 2019-02-08 PROCEDURE — 97110 THERAPEUTIC EXERCISES: CPT | Performed by: PHYSICAL THERAPIST

## 2019-02-08 NOTE — PROGRESS NOTES
Diagnosis:  Superior glenoid labrum lesion of left shoulder, initial encounter (F46.828L); Complete tear of left rotator cuff (Q68.744)   Surgery: 11/27/18      Authorized # of Visits: (per eval 12 wks)    Insurance: [employer] EPO/PPO            Next MD visit (L) UE abd to 45 degs x 20 reps    Standing (L) UE isometric on wall using pillow: flexion/extension/abduction/ER/IR 10 reps x 10 cts ea NuStep UE only L5 x 8 mins    Supine: (L) UE PROM all directions with oscillations    Supine: (L) UE flexion to 90 degs fwd/bwkd    Prone: (L) UE n.row 6 lbs 2 x 20 reps    Prone: (L) UE extension 5 lbs 2 x 20 reps    Prone: (L) UE h. Abduction 3 lbs 2 x 20 reps    Prone: (L) UE rhomboid 1 lb 2 x 20 reps    Prone: (L) UE scaption 2 lbs 2 x 20 reps    Seated: (L) UE ER @ 90/ tired    Functional bicep curls 6 lbs forward/diagonal 2 x 20 reps ea    (R/L) UE D1D2 Flex/Ext blueTB 2 x 20 reps ea    Prone: (L) UE plyocatch 1 kg ball in scaption release/catch 5 sets x 20 reps    (L) UE OH 4 lb ball bounce 5 reps x 20 secs ea Scifit U pain.  Progressed planks to using an inverted bosu. No pain or loss of motion noted. He was instructed to do exercises in gym but not to add heavy weights just more repetitions.      Goals: - IN PROGRESS  1) The patient will be safe and independent with a

## 2019-02-13 ENCOUNTER — OFFICE VISIT (OUTPATIENT)
Dept: PHYSICAL THERAPY | Age: 44
End: 2019-02-13
Attending: ORTHOPAEDIC SURGERY
Payer: COMMERCIAL

## 2019-02-13 PROCEDURE — 97110 THERAPEUTIC EXERCISES: CPT | Performed by: PHYSICAL THERAPIST

## 2019-02-14 ENCOUNTER — NURSE ONLY (OUTPATIENT)
Dept: ALLERGY | Facility: CLINIC | Age: 44
End: 2019-02-14
Payer: COMMERCIAL

## 2019-02-14 ENCOUNTER — OFFICE VISIT (OUTPATIENT)
Dept: ALLERGY | Facility: CLINIC | Age: 44
End: 2019-02-14
Payer: COMMERCIAL

## 2019-02-14 VITALS
WEIGHT: 242.81 LBS | RESPIRATION RATE: 16 BRPM | HEART RATE: 71 BPM | OXYGEN SATURATION: 99 % | TEMPERATURE: 99 F | SYSTOLIC BLOOD PRESSURE: 119 MMHG | HEIGHT: 73 IN | DIASTOLIC BLOOD PRESSURE: 75 MMHG | BODY MASS INDEX: 32.18 KG/M2

## 2019-02-14 DIAGNOSIS — Z91.09 ENVIRONMENTAL ALLERGIES: ICD-10-CM

## 2019-02-14 DIAGNOSIS — J30.1 ALLERGIC RHINITIS DUE TO POLLEN, UNSPECIFIED SEASONALITY: ICD-10-CM

## 2019-02-14 DIAGNOSIS — J30.1 SEASONAL ALLERGIC RHINITIS DUE TO POLLEN: Primary | ICD-10-CM

## 2019-02-14 PROCEDURE — 99212 OFFICE O/P EST SF 10 MIN: CPT | Performed by: ALLERGY & IMMUNOLOGY

## 2019-02-14 PROCEDURE — 99214 OFFICE O/P EST MOD 30 MIN: CPT | Performed by: ALLERGY & IMMUNOLOGY

## 2019-02-14 PROCEDURE — 95117 IMMUNOTHERAPY INJECTIONS: CPT | Performed by: ALLERGY & IMMUNOLOGY

## 2019-02-14 NOTE — PROGRESS NOTES
Syed Mahoney is a 39year old male. HPI:   No chief complaint on file. Patient is a 54-year-old male who presents for follow-up with a chief complaint of allergies. Patient last seen by me on June 14, 2018.   Patient has a history of seasonal (six) hours as needed. Disp: 40 tablet Rfl: 0   Ergocalciferol (VITAMIN D OR) Take 1 tablet by mouth daily. Disp:  Rfl:    ATORVASTATIN 10 MG Oral Tab TAKE 1 TABLET (10 MG TOTAL) BY MOUTH NIGHTLY.  Disp: 90 tablet Rfl: 0       Allergies:  No Known Allergies avoidance measures  Xyzal and Nasonex as needed         Orders This Visit:  No orders of the defined types were placed in this encounter.       Meds This Visit:  Requested Prescriptions      No prescriptions requested or ordered in this encounter       Imag

## 2019-02-15 ENCOUNTER — OFFICE VISIT (OUTPATIENT)
Dept: PHYSICAL THERAPY | Age: 44
End: 2019-02-15
Attending: ORTHOPAEDIC SURGERY
Payer: COMMERCIAL

## 2019-02-15 PROCEDURE — 97110 THERAPEUTIC EXERCISES: CPT | Performed by: PHYSICAL THERAPIST

## 2019-02-15 NOTE — PROGRESS NOTES
Diagnosis:  Superior glenoid labrum lesion of left shoulder, initial encounter (N68.092J); Complete tear of left rotator cuff (L21.598)   Surgery: 11/27/18      Authorized # of Visits: (per eval 12 wks)    Insurance: [employer] EPO/PPO            Next MD visit

## 2019-02-15 NOTE — PROGRESS NOTES
Diagnosis:  Superior glenoid labrum lesion of left shoulder, initial encounter (N54.460O); Complete tear of left rotator cuff (G95.447)   Surgery: 11/27/18      Authorized # of Visits: (per eval 12 wks)    Insurance: [employer] EPO/PPO            Next MD visit flexion/extension/abduction/ER/IR 10 reps x 10 cts ea NuStep UE only L5 x 8 mins    Supine: (L) UE PROM all directions with oscillations    Supine: (L) UE flexion to 90 degs 2 lbs x 20 reps    Supine: (L) UE punches @ 90 degs 2 lbs x 20 reps    Supine: (L) 20 reps    Prone: (L) UE h. Abduction 3 lbs 2 x 20 reps    Prone: (L) UE rhomboid 1 lb 2 x 20 reps    Prone: (L) UE scaption 2 lbs 2 x 20 reps    Seated: (L) UE ER @ 90/90 with redTB 2 x 20 reps    (L) UE scaption rhytmic stab with 1 kg ball 5 sets x 10 re forward/diagonal 2 x 20 reps ea    (R/L) UE D1D2 Flex/Ext blueTB 2 x 20 reps ea    Prone: (L) UE plyocatch 1 kg ball in scaption release/catch 5 sets x 20 reps    (L) UE OH 4 lb ball bounce 5 reps x 20 secs ea Scifit UE only L9 4 mins fwd/bwkd    (B) UE bl 90/90 blueTB 2 sets x 10 reps x 10 cts ea    Standing: (B) UE rhytmic-hold ER @ 90/90 blueTB 3 sets x 10 reps ea; clicking (L) shoulder, no pain    Prone: (B) UE rhytmic hold rhomboids, scaption, ER @ 90/90 2 lbs 10 sets x 10 reps ea    Catch/throw 4 lb ba protocol. Charges:  TherEx x 3      Total Timed Treatment: 49 mins     TotalTreatment Time: 50 mins

## 2019-02-18 ENCOUNTER — OFFICE VISIT (OUTPATIENT)
Dept: ORTHOPEDICS CLINIC | Facility: CLINIC | Age: 44
End: 2019-02-18
Payer: COMMERCIAL

## 2019-02-18 DIAGNOSIS — S43.432A SUPERIOR GLENOID LABRUM LESION OF LEFT SHOULDER, INITIAL ENCOUNTER: Primary | ICD-10-CM

## 2019-02-18 PROCEDURE — 99212 OFFICE O/P EST SF 10 MIN: CPT | Performed by: ORTHOPAEDIC SURGERY

## 2019-02-18 PROCEDURE — 99024 POSTOP FOLLOW-UP VISIT: CPT | Performed by: ORTHOPAEDIC SURGERY

## 2019-02-18 NOTE — PROGRESS NOTES
NURSING INTAKE COMMENTS: Patient presents with:  Post-Op: Second post op f/u on left shoulder surgery. Denies any pain, tingling, numbness or swelling.   Has been going to PT twice per week and has seen improvment      Patient presents almost 3 months stat extremity is neurovascularly intact with no focal deficits. Maribelkathleen Bermudez was seen today for post-op.     Diagnoses and all orders for this visit:    Superior glenoid labrum lesion of left shoulder, initial encounter        Plan:  Finish up with PT/OT

## 2019-02-19 ENCOUNTER — OFFICE VISIT (OUTPATIENT)
Dept: PHYSICAL THERAPY | Age: 44
End: 2019-02-19
Attending: ORTHOPAEDIC SURGERY
Payer: COMMERCIAL

## 2019-02-19 PROCEDURE — 97110 THERAPEUTIC EXERCISES: CPT | Performed by: PHYSICAL THERAPIST

## 2019-02-19 NOTE — PROGRESS NOTES
Diagnosis:  Superior glenoid labrum lesion of left shoulder, initial encounter (H61.646R); Complete tear of left rotator cuff (F61.274)   Surgery: 11/27/18      Authorized # of Visits: (per eval 12 wks)    Insurance: Hailey Sahu EPO/PPO            Next MD visit flexion/extension/abduction/ER/IR 10 reps x 10 cts ea NuStep UE only L5 x 8 mins    Supine: (L) UE PROM all directions with oscillations    Supine: (L) UE flexion to 90 degs 2 lbs x 20 reps    Supine: (L) UE punches @ 90 degs 2 lbs x 20 reps    Supine: (L) 20 reps    Prone: (L) UE h. Abduction 3 lbs 2 x 20 reps    Prone: (L) UE rhomboid 1 lb 2 x 20 reps    Prone: (L) UE scaption 2 lbs 2 x 20 reps    Seated: (L) UE ER @ 90/90 with redTB 2 x 20 reps    (L) UE scaption rhytmic stab with 1 kg ball 5 sets x 10 re tired    Functional bicep curls 6 lbs forward/diagonal 2 x 20 reps ea    (R/L) UE D1D2 Flex/Ext blueTB 2 x 20 reps ea    Prone: (L) UE plyocatch 1 kg ball in scaption release/catch 5 sets x 20 reps    (L) UE OH 4 lb ball bounce 5 reps x 20 secs ea Scifit U Seated: (L) UE eccentric ER/IR @ 90/90 blueTB 2 sets x 10 reps x 10 cts ea    Standing: (B) UE rhytmic-hold ER @ 90/90 blueTB 3 sets x 10 reps ea; clicking (L) shoulder, no pain    Prone: (B) UE rhytmic hold rhomboids, scaption, ER @ 90/90 2 lbs 10 set lifting > 25 lbs necessary to hold, carry and care for an infant without limitation     5) The patient will report a 50-75% improvement in ability to lay flat and sleep without awakening secondary to pain       Plan:   Continue progress through protocol.

## 2019-02-21 ENCOUNTER — NURSE ONLY (OUTPATIENT)
Dept: ALLERGY | Facility: CLINIC | Age: 44
End: 2019-02-21
Payer: COMMERCIAL

## 2019-02-21 DIAGNOSIS — J30.89 ENVIRONMENTAL AND SEASONAL ALLERGIES: ICD-10-CM

## 2019-02-21 PROCEDURE — 95117 IMMUNOTHERAPY INJECTIONS: CPT | Performed by: ALLERGY & IMMUNOLOGY

## 2019-02-21 PROCEDURE — 95165 ANTIGEN THERAPY SERVICES: CPT | Performed by: ALLERGY & IMMUNOLOGY

## 2019-02-22 ENCOUNTER — APPOINTMENT (OUTPATIENT)
Dept: PHYSICAL THERAPY | Age: 44
End: 2019-02-22
Attending: ORTHOPAEDIC SURGERY
Payer: COMMERCIAL

## 2019-02-27 ENCOUNTER — OFFICE VISIT (OUTPATIENT)
Dept: PHYSICAL THERAPY | Age: 44
End: 2019-02-27
Attending: ORTHOPAEDIC SURGERY
Payer: COMMERCIAL

## 2019-02-27 ENCOUNTER — NURSE ONLY (OUTPATIENT)
Dept: ALLERGY | Facility: CLINIC | Age: 44
End: 2019-02-27
Payer: COMMERCIAL

## 2019-02-27 DIAGNOSIS — J30.89 ENVIRONMENTAL AND SEASONAL ALLERGIES: ICD-10-CM

## 2019-02-27 PROCEDURE — 97110 THERAPEUTIC EXERCISES: CPT | Performed by: PHYSICAL THERAPIST

## 2019-02-27 PROCEDURE — 95117 IMMUNOTHERAPY INJECTIONS: CPT | Performed by: ALLERGY & IMMUNOLOGY

## 2019-02-27 NOTE — PROGRESS NOTES
Diagnosis:  Superior glenoid labrum lesion of left shoulder, initial encounter (N52.062C); Complete tear of left rotator cuff (H65.049)   Surgery: 11/27/18      Authorized # of Visits: (per eval 12 wks)    Insurance: [employer] EPO/PPO            Next MD visit pillow: flexion/extension/abduction/ER/IR 10 reps x 10 cts ea NuStep UE only L5 x 8 mins    Supine: (L) UE PROM all directions with oscillations    Supine: (L) UE flexion to 90 degs 2 lbs x 20 reps    Supine: (L) UE punches @ 90 degs 2 lbs x 20 reps    Sup lbs 2 x 20 reps    Prone: (L) UE h. Abduction 3 lbs 2 x 20 reps    Prone: (L) UE rhomboid 1 lb 2 x 20 reps    Prone: (L) UE scaption 2 lbs 2 x 20 reps    Seated: (L) UE ER @ 90/90 with redTB 2 x 20 reps    (L) UE scaption rhytmic stab with 1 kg ball 5 sets greenTB 2 x 10 reps; tired    Functional bicep curls 6 lbs forward/diagonal 2 x 20 reps ea    (R/L) UE D1D2 Flex/Ext blueTB 2 x 20 reps ea    Prone: (L) UE plyocatch 1 kg ball in scaption release/catch 5 sets x 20 reps    (L) UE OH 4 lb ball bounce 5 reps x 10 reps x 10 cts ea     Seated: (L) UE eccentric ER/IR @ 90/90 blueTB 2 sets x 10 reps x 10 cts ea    Standing: (B) UE rhytmic-hold ER @ 90/90 blueTB 3 sets x 10 reps ea; clicking (L) shoulder, no pain    Prone: (B) UE rhytmic hold rhomboids, scaption, E UE dressing without discomfort or compensation. He is also back in the gym starting with his normal routine and incorporating his therapeutic exercises into his workout. He remained well motivated and compliant with his HEP and it's progression.   He was

## 2019-03-01 ENCOUNTER — APPOINTMENT (OUTPATIENT)
Dept: PHYSICAL THERAPY | Age: 44
End: 2019-03-01
Attending: ORTHOPAEDIC SURGERY
Payer: COMMERCIAL

## 2019-03-06 ENCOUNTER — APPOINTMENT (OUTPATIENT)
Dept: PHYSICAL THERAPY | Age: 44
End: 2019-03-06
Attending: ORTHOPAEDIC SURGERY
Payer: COMMERCIAL

## 2019-03-07 ENCOUNTER — NURSE ONLY (OUTPATIENT)
Dept: ALLERGY | Facility: CLINIC | Age: 44
End: 2019-03-07
Payer: COMMERCIAL

## 2019-03-07 DIAGNOSIS — J30.89 ENVIRONMENTAL AND SEASONAL ALLERGIES: ICD-10-CM

## 2019-03-07 PROCEDURE — 95117 IMMUNOTHERAPY INJECTIONS: CPT | Performed by: ALLERGY & IMMUNOLOGY

## 2019-03-08 ENCOUNTER — APPOINTMENT (OUTPATIENT)
Dept: PHYSICAL THERAPY | Age: 44
End: 2019-03-08
Attending: ORTHOPAEDIC SURGERY
Payer: COMMERCIAL

## 2019-03-12 ENCOUNTER — APPOINTMENT (OUTPATIENT)
Dept: PHYSICAL THERAPY | Age: 44
End: 2019-03-12
Attending: ORTHOPAEDIC SURGERY
Payer: COMMERCIAL

## 2019-03-14 ENCOUNTER — NURSE ONLY (OUTPATIENT)
Dept: ALLERGY | Facility: CLINIC | Age: 44
End: 2019-03-14
Payer: COMMERCIAL

## 2019-03-14 DIAGNOSIS — J30.89 ENVIRONMENTAL AND SEASONAL ALLERGIES: ICD-10-CM

## 2019-03-14 PROCEDURE — 95117 IMMUNOTHERAPY INJECTIONS: CPT | Performed by: ALLERGY & IMMUNOLOGY

## 2019-03-14 PROCEDURE — 95165 ANTIGEN THERAPY SERVICES: CPT | Performed by: ALLERGY & IMMUNOLOGY

## 2019-03-15 ENCOUNTER — APPOINTMENT (OUTPATIENT)
Dept: PHYSICAL THERAPY | Age: 44
End: 2019-03-15
Attending: ORTHOPAEDIC SURGERY
Payer: COMMERCIAL

## 2019-03-19 ENCOUNTER — APPOINTMENT (OUTPATIENT)
Dept: PHYSICAL THERAPY | Age: 44
End: 2019-03-19
Attending: ORTHOPAEDIC SURGERY
Payer: COMMERCIAL

## 2019-03-22 ENCOUNTER — APPOINTMENT (OUTPATIENT)
Dept: PHYSICAL THERAPY | Age: 44
End: 2019-03-22
Attending: ORTHOPAEDIC SURGERY
Payer: COMMERCIAL

## 2019-03-25 ENCOUNTER — OFFICE VISIT (OUTPATIENT)
Dept: INTERNAL MEDICINE CLINIC | Facility: CLINIC | Age: 44
End: 2019-03-25
Payer: COMMERCIAL

## 2019-03-25 VITALS
SYSTOLIC BLOOD PRESSURE: 159 MMHG | DIASTOLIC BLOOD PRESSURE: 89 MMHG | TEMPERATURE: 97 F | BODY MASS INDEX: 32.87 KG/M2 | HEART RATE: 78 BPM | HEIGHT: 73 IN | RESPIRATION RATE: 18 BRPM | WEIGHT: 248 LBS

## 2019-03-25 DIAGNOSIS — Z00.00 PHYSICAL EXAM, ANNUAL: Primary | ICD-10-CM

## 2019-03-25 PROCEDURE — 99396 PREV VISIT EST AGE 40-64: CPT | Performed by: INTERNAL MEDICINE

## 2019-03-25 NOTE — PROGRESS NOTES
Tejas Redi is a 39year old male who presents for a complete physical exam.   HPI:   Pt complains of nothing.        Immunization History  Administered            Date(s) Administered    IPV                   10/02/2014      Influenza             10/ [address] UNLISTED Right 12/1/2009      Family History   Problem Relation Age of Onset   • Hypertension Father    • Cancer Father         skin   • Other (Other) Father    • Diabetes Neg    • Glaucoma Neg       Social History:  Social History    Tobacco Use back  EXTREMITIES: no cyanosis, clubbing or edema  NEURO: Oriented times three,cranial nerves are intact,motor and sensory are grossly intact    ASSESSMENT AND PLAN:   1.  Physical exam, annual    Zari Toribio is a 36year old male who presents for a c

## 2019-03-27 ENCOUNTER — APPOINTMENT (OUTPATIENT)
Dept: PHYSICAL THERAPY | Age: 44
End: 2019-03-27
Attending: ORTHOPAEDIC SURGERY
Payer: COMMERCIAL

## 2019-03-28 ENCOUNTER — NURSE ONLY (OUTPATIENT)
Dept: ALLERGY | Facility: CLINIC | Age: 44
End: 2019-03-28
Payer: COMMERCIAL

## 2019-03-28 DIAGNOSIS — J30.89 ENVIRONMENTAL AND SEASONAL ALLERGIES: ICD-10-CM

## 2019-03-28 PROCEDURE — 95165 ANTIGEN THERAPY SERVICES: CPT | Performed by: ALLERGY & IMMUNOLOGY

## 2019-03-28 PROCEDURE — 95117 IMMUNOTHERAPY INJECTIONS: CPT | Performed by: ALLERGY & IMMUNOLOGY

## 2019-03-29 ENCOUNTER — APPOINTMENT (OUTPATIENT)
Dept: PHYSICAL THERAPY | Age: 44
End: 2019-03-29
Attending: ORTHOPAEDIC SURGERY
Payer: COMMERCIAL

## 2019-04-10 ENCOUNTER — LAB ENCOUNTER (OUTPATIENT)
Dept: LAB | Facility: HOSPITAL | Age: 44
End: 2019-04-10
Attending: INTERNAL MEDICINE
Payer: COMMERCIAL

## 2019-04-10 DIAGNOSIS — Z00.00 PHYSICAL EXAM, ANNUAL: ICD-10-CM

## 2019-04-10 PROCEDURE — 85025 COMPLETE CBC W/AUTO DIFF WBC: CPT

## 2019-04-10 PROCEDURE — 82306 VITAMIN D 25 HYDROXY: CPT

## 2019-04-10 PROCEDURE — 81003 URINALYSIS AUTO W/O SCOPE: CPT

## 2019-04-10 PROCEDURE — 80053 COMPREHEN METABOLIC PANEL: CPT

## 2019-04-10 PROCEDURE — 80061 LIPID PANEL: CPT

## 2019-04-10 PROCEDURE — 36415 COLL VENOUS BLD VENIPUNCTURE: CPT

## 2019-04-10 PROCEDURE — 84443 ASSAY THYROID STIM HORMONE: CPT

## 2019-04-11 ENCOUNTER — NURSE ONLY (OUTPATIENT)
Dept: ALLERGY | Facility: CLINIC | Age: 44
End: 2019-04-11
Payer: COMMERCIAL

## 2019-04-11 DIAGNOSIS — J30.89 ENVIRONMENTAL AND SEASONAL ALLERGIES: ICD-10-CM

## 2019-04-11 PROCEDURE — 95117 IMMUNOTHERAPY INJECTIONS: CPT | Performed by: ALLERGY & IMMUNOLOGY

## 2019-05-02 ENCOUNTER — NURSE ONLY (OUTPATIENT)
Dept: ALLERGY | Facility: CLINIC | Age: 44
End: 2019-05-02
Payer: COMMERCIAL

## 2019-05-02 DIAGNOSIS — J30.89 ENVIRONMENTAL AND SEASONAL ALLERGIES: ICD-10-CM

## 2019-05-02 PROCEDURE — 95117 IMMUNOTHERAPY INJECTIONS: CPT | Performed by: ALLERGY & IMMUNOLOGY

## 2019-05-09 RX ORDER — ATORVASTATIN CALCIUM 10 MG/1
10 TABLET, FILM COATED ORAL NIGHTLY
Qty: 90 TABLET | Refills: 1 | Status: SHIPPED | OUTPATIENT
Start: 2019-05-09 | End: 2019-10-24

## 2019-05-09 RX ORDER — ATORVASTATIN CALCIUM 10 MG/1
10 TABLET, FILM COATED ORAL NIGHTLY
Qty: 90 TABLET | Refills: 0 | OUTPATIENT
Start: 2019-05-09

## 2019-05-23 ENCOUNTER — NURSE ONLY (OUTPATIENT)
Dept: ALLERGY | Facility: CLINIC | Age: 44
End: 2019-05-23
Payer: COMMERCIAL

## 2019-05-23 DIAGNOSIS — J30.89 ENVIRONMENTAL AND SEASONAL ALLERGIES: ICD-10-CM

## 2019-05-23 PROCEDURE — 95165 ANTIGEN THERAPY SERVICES: CPT | Performed by: ALLERGY & IMMUNOLOGY

## 2019-05-23 PROCEDURE — 95117 IMMUNOTHERAPY INJECTIONS: CPT | Performed by: ALLERGY & IMMUNOLOGY

## 2019-06-19 ENCOUNTER — NURSE ONLY (OUTPATIENT)
Dept: ALLERGY | Facility: CLINIC | Age: 44
End: 2019-06-19
Payer: COMMERCIAL

## 2019-06-19 DIAGNOSIS — J30.89 ENVIRONMENTAL AND SEASONAL ALLERGIES: ICD-10-CM

## 2019-06-19 PROCEDURE — 95117 IMMUNOTHERAPY INJECTIONS: CPT | Performed by: ALLERGY & IMMUNOLOGY

## 2019-06-19 PROCEDURE — 95165 ANTIGEN THERAPY SERVICES: CPT | Performed by: ALLERGY & IMMUNOLOGY

## 2019-07-18 ENCOUNTER — NURSE ONLY (OUTPATIENT)
Dept: ALLERGY | Facility: CLINIC | Age: 44
End: 2019-07-18
Payer: COMMERCIAL

## 2019-07-18 DIAGNOSIS — J30.89 ENVIRONMENTAL AND SEASONAL ALLERGIES: ICD-10-CM

## 2019-07-18 PROCEDURE — 95117 IMMUNOTHERAPY INJECTIONS: CPT | Performed by: ALLERGY & IMMUNOLOGY

## 2019-07-31 DIAGNOSIS — I10 ESSENTIAL HYPERTENSION: ICD-10-CM

## 2019-07-31 RX ORDER — LISINOPRIL AND HYDROCHLOROTHIAZIDE 12.5; 1 MG/1; MG/1
1 TABLET ORAL DAILY
Qty: 90 TABLET | Refills: 1 | Status: SHIPPED | OUTPATIENT
Start: 2019-07-31 | End: 2020-01-17

## 2019-08-14 ENCOUNTER — NURSE ONLY (OUTPATIENT)
Dept: ALLERGY | Facility: CLINIC | Age: 44
End: 2019-08-14
Payer: COMMERCIAL

## 2019-08-14 DIAGNOSIS — J30.89 ENVIRONMENTAL AND SEASONAL ALLERGIES: ICD-10-CM

## 2019-08-14 PROCEDURE — 95117 IMMUNOTHERAPY INJECTIONS: CPT | Performed by: ALLERGY & IMMUNOLOGY

## 2019-09-12 ENCOUNTER — NURSE ONLY (OUTPATIENT)
Dept: ALLERGY | Facility: CLINIC | Age: 44
End: 2019-09-12
Payer: COMMERCIAL

## 2019-09-12 DIAGNOSIS — J30.89 ENVIRONMENTAL AND SEASONAL ALLERGIES: ICD-10-CM

## 2019-09-12 PROCEDURE — 95165 ANTIGEN THERAPY SERVICES: CPT | Performed by: ALLERGY & IMMUNOLOGY

## 2019-09-12 PROCEDURE — 95117 IMMUNOTHERAPY INJECTIONS: CPT | Performed by: ALLERGY & IMMUNOLOGY

## 2019-09-30 ENCOUNTER — PATIENT MESSAGE (OUTPATIENT)
Dept: INTERNAL MEDICINE CLINIC | Facility: CLINIC | Age: 44
End: 2019-09-30

## 2019-09-30 NOTE — TELEPHONE ENCOUNTER
From: Vannessa Powers  To: Flaquito Rivera MD  Sent: 9/30/2019 8:47 AM CDT  Subject: Other    I saw a reminder showing for my Flu Shot and I did get this done at work on Monday 9/23/19.     Thanks

## 2019-10-10 ENCOUNTER — NURSE ONLY (OUTPATIENT)
Dept: ALLERGY | Facility: CLINIC | Age: 44
End: 2019-10-10
Payer: COMMERCIAL

## 2019-10-10 DIAGNOSIS — J30.89 ENVIRONMENTAL AND SEASONAL ALLERGIES: ICD-10-CM

## 2019-10-10 PROCEDURE — 95117 IMMUNOTHERAPY INJECTIONS: CPT | Performed by: ALLERGY & IMMUNOLOGY

## 2019-10-10 PROCEDURE — 95165 ANTIGEN THERAPY SERVICES: CPT | Performed by: ALLERGY & IMMUNOLOGY

## 2019-10-25 RX ORDER — ATORVASTATIN CALCIUM 10 MG/1
TABLET, FILM COATED ORAL
Qty: 90 TABLET | Refills: 1 | Status: SHIPPED | OUTPATIENT
Start: 2019-10-25 | End: 2020-03-24

## 2019-10-25 NOTE — TELEPHONE ENCOUNTER
Refill passed per [facility], [facility] protocol.   Cholesterol Medications  Protocol Criteria:  · Appointment scheduled in the past 12 months or in the next 3 months  · ALT & LDL on file in the past 12 months  · ALT result < 80  · LDL result <130   Recent Outpat

## 2019-11-07 ENCOUNTER — NURSE ONLY (OUTPATIENT)
Dept: ALLERGY | Facility: CLINIC | Age: 44
End: 2019-11-07
Payer: COMMERCIAL

## 2019-11-07 DIAGNOSIS — J30.89 ENVIRONMENTAL AND SEASONAL ALLERGIES: ICD-10-CM

## 2019-11-07 PROCEDURE — 95117 IMMUNOTHERAPY INJECTIONS: CPT | Performed by: ALLERGY & IMMUNOLOGY

## 2019-12-05 ENCOUNTER — NURSE ONLY (OUTPATIENT)
Dept: ALLERGY | Facility: CLINIC | Age: 44
End: 2019-12-05
Payer: COMMERCIAL

## 2019-12-05 DIAGNOSIS — J30.89 ENVIRONMENTAL AND SEASONAL ALLERGIES: ICD-10-CM

## 2019-12-05 PROCEDURE — 95117 IMMUNOTHERAPY INJECTIONS: CPT | Performed by: ALLERGY & IMMUNOLOGY

## 2020-01-02 ENCOUNTER — NURSE ONLY (OUTPATIENT)
Dept: ALLERGY | Facility: CLINIC | Age: 45
End: 2020-01-02
Payer: COMMERCIAL

## 2020-01-02 DIAGNOSIS — J30.89 ENVIRONMENTAL AND SEASONAL ALLERGIES: ICD-10-CM

## 2020-01-02 PROCEDURE — 95165 ANTIGEN THERAPY SERVICES: CPT | Performed by: ALLERGY & IMMUNOLOGY

## 2020-01-02 PROCEDURE — 95117 IMMUNOTHERAPY INJECTIONS: CPT | Performed by: ALLERGY & IMMUNOLOGY

## 2020-01-17 DIAGNOSIS — I10 ESSENTIAL HYPERTENSION: ICD-10-CM

## 2020-01-17 RX ORDER — LISINOPRIL AND HYDROCHLOROTHIAZIDE 12.5; 1 MG/1; MG/1
TABLET ORAL
Qty: 90 TABLET | Refills: 0 | Status: SHIPPED | OUTPATIENT
Start: 2020-01-17 | End: 2020-03-24

## 2020-01-30 ENCOUNTER — NURSE ONLY (OUTPATIENT)
Dept: ALLERGY | Facility: CLINIC | Age: 45
End: 2020-01-30
Payer: COMMERCIAL

## 2020-01-30 DIAGNOSIS — J30.89 ENVIRONMENTAL AND SEASONAL ALLERGIES: ICD-10-CM

## 2020-01-30 PROCEDURE — 95117 IMMUNOTHERAPY INJECTIONS: CPT | Performed by: ALLERGY & IMMUNOLOGY

## 2020-01-30 PROCEDURE — 95165 ANTIGEN THERAPY SERVICES: CPT | Performed by: ALLERGY & IMMUNOLOGY

## 2020-02-27 ENCOUNTER — OFFICE VISIT (OUTPATIENT)
Dept: ALLERGY | Facility: CLINIC | Age: 45
End: 2020-02-27
Payer: COMMERCIAL

## 2020-02-27 ENCOUNTER — NURSE ONLY (OUTPATIENT)
Dept: ALLERGY | Facility: CLINIC | Age: 45
End: 2020-02-27
Payer: COMMERCIAL

## 2020-02-27 VITALS
DIASTOLIC BLOOD PRESSURE: 73 MMHG | OXYGEN SATURATION: 97 % | RESPIRATION RATE: 18 BRPM | WEIGHT: 234 LBS | HEIGHT: 72 IN | TEMPERATURE: 97 F | BODY MASS INDEX: 31.69 KG/M2 | HEART RATE: 72 BPM | SYSTOLIC BLOOD PRESSURE: 136 MMHG

## 2020-02-27 DIAGNOSIS — J30.1 SEASONAL ALLERGIC RHINITIS DUE TO POLLEN: Primary | ICD-10-CM

## 2020-02-27 DIAGNOSIS — J30.89 ENVIRONMENTAL AND SEASONAL ALLERGIES: ICD-10-CM

## 2020-02-27 DIAGNOSIS — Z91.09 ENVIRONMENTAL ALLERGIES: ICD-10-CM

## 2020-02-27 PROCEDURE — 99213 OFFICE O/P EST LOW 20 MIN: CPT | Performed by: ALLERGY & IMMUNOLOGY

## 2020-02-27 PROCEDURE — 95117 IMMUNOTHERAPY INJECTIONS: CPT | Performed by: ALLERGY & IMMUNOLOGY

## 2020-02-27 RX ORDER — CETIRIZINE HYDROCHLORIDE 10 MG/1
10 TABLET ORAL DAILY PRN
COMMUNITY

## 2020-02-27 NOTE — PROGRESS NOTES
Karen Loyd is a 38year old male. HPI:   Patient presents with: Allergies: Pt presents for 1 yr f/u for AR. Offers that AR symptoms have been controlled. Pt taking Zyrtec as needed.      Patient is a 50-year-old male who presents for follow-up wi daily.     • LISINOPRIL-HYDROCHLOROTHIAZIDE 10-12.5 MG Oral Tab TAKE 1 TABLET BY MOUTH EVERY DAY 90 tablet 0   • ATORVASTATIN 10 MG Oral Tab TAKE 1 TABLET BY MOUTH EVERY DAY AT NIGHT 90 tablet 1   • Ergocalciferol (VITAMIN D OR) Take 1 tablet by mouth farida immunotherapy. Requiring less medications and starting immunotherapy    Recs: Reviewed allergy avoidance measures  Continue with Zyrtec on an as-needed basis  Continue with maintenance dose immunotherapy every 4 weeks.   Reviewed currently recommended 3 to

## 2020-03-24 ENCOUNTER — TELEPHONE (OUTPATIENT)
Dept: INTERNAL MEDICINE CLINIC | Facility: CLINIC | Age: 45
End: 2020-03-24

## 2020-03-24 DIAGNOSIS — I10 ESSENTIAL HYPERTENSION: ICD-10-CM

## 2020-03-24 RX ORDER — ATORVASTATIN CALCIUM 10 MG/1
TABLET, FILM COATED ORAL
Qty: 90 TABLET | Refills: 0 | Status: SHIPPED | OUTPATIENT
Start: 2020-03-24 | End: 2020-08-11

## 2020-03-24 RX ORDER — LISINOPRIL AND HYDROCHLOROTHIAZIDE 12.5; 1 MG/1; MG/1
1 TABLET ORAL
Qty: 90 TABLET | Refills: 0 | Status: SHIPPED | OUTPATIENT
Start: 2020-03-24 | End: 2020-06-19

## 2020-03-24 NOTE — TELEPHONE ENCOUNTER
Patient had to r/s physical to may 1 due to covid concerns.  He is low on meds- ATORVASTATIN 10 MG     LISINOPRIL-HYDROCHLOROTHIAZIDE 10-12.5 MG Oral Tab

## 2020-03-26 ENCOUNTER — NURSE ONLY (OUTPATIENT)
Dept: ALLERGY | Facility: CLINIC | Age: 45
End: 2020-03-26
Payer: COMMERCIAL

## 2020-03-26 DIAGNOSIS — J30.89 ENVIRONMENTAL AND SEASONAL ALLERGIES: ICD-10-CM

## 2020-03-26 PROCEDURE — 95117 IMMUNOTHERAPY INJECTIONS: CPT | Performed by: ALLERGY & IMMUNOLOGY

## 2020-04-22 ENCOUNTER — NURSE ONLY (OUTPATIENT)
Dept: ALLERGY | Facility: CLINIC | Age: 45
End: 2020-04-22
Payer: COMMERCIAL

## 2020-04-22 DIAGNOSIS — J30.89 ENVIRONMENTAL AND SEASONAL ALLERGIES: ICD-10-CM

## 2020-04-22 PROCEDURE — 95117 IMMUNOTHERAPY INJECTIONS: CPT | Performed by: ALLERGY & IMMUNOLOGY

## 2020-05-21 ENCOUNTER — NURSE ONLY (OUTPATIENT)
Dept: ALLERGY | Facility: CLINIC | Age: 45
End: 2020-05-21
Payer: COMMERCIAL

## 2020-05-21 DIAGNOSIS — J30.89 ENVIRONMENTAL AND SEASONAL ALLERGIES: ICD-10-CM

## 2020-05-21 PROCEDURE — 95117 IMMUNOTHERAPY INJECTIONS: CPT | Performed by: ALLERGY & IMMUNOLOGY

## 2020-05-21 PROCEDURE — 95165 ANTIGEN THERAPY SERVICES: CPT | Performed by: ALLERGY & IMMUNOLOGY

## 2020-06-18 ENCOUNTER — NURSE ONLY (OUTPATIENT)
Dept: ALLERGY | Facility: CLINIC | Age: 45
End: 2020-06-18
Payer: COMMERCIAL

## 2020-06-18 DIAGNOSIS — J30.89 ENVIRONMENTAL AND SEASONAL ALLERGIES: ICD-10-CM

## 2020-06-18 PROCEDURE — 95117 IMMUNOTHERAPY INJECTIONS: CPT | Performed by: ALLERGY & IMMUNOLOGY

## 2020-06-18 PROCEDURE — 95165 ANTIGEN THERAPY SERVICES: CPT | Performed by: ALLERGY & IMMUNOLOGY

## 2020-06-19 DIAGNOSIS — I10 ESSENTIAL HYPERTENSION: ICD-10-CM

## 2020-06-19 RX ORDER — LISINOPRIL AND HYDROCHLOROTHIAZIDE 12.5; 1 MG/1; MG/1
TABLET ORAL
Qty: 90 TABLET | Refills: 0 | Status: SHIPPED | OUTPATIENT
Start: 2020-06-19 | End: 2020-09-11

## 2020-07-16 ENCOUNTER — NURSE ONLY (OUTPATIENT)
Dept: ALLERGY | Facility: CLINIC | Age: 45
End: 2020-07-16
Payer: COMMERCIAL

## 2020-07-16 DIAGNOSIS — J30.89 ENVIRONMENTAL AND SEASONAL ALLERGIES: ICD-10-CM

## 2020-07-16 PROCEDURE — 95117 IMMUNOTHERAPY INJECTIONS: CPT | Performed by: ALLERGY & IMMUNOLOGY

## 2020-08-11 RX ORDER — ATORVASTATIN CALCIUM 10 MG/1
TABLET, FILM COATED ORAL
Qty: 90 TABLET | Refills: 0 | Status: SHIPPED | OUTPATIENT
Start: 2020-08-11 | End: 2020-11-04

## 2020-08-13 ENCOUNTER — NURSE ONLY (OUTPATIENT)
Dept: ALLERGY | Facility: CLINIC | Age: 45
End: 2020-08-13
Payer: COMMERCIAL

## 2020-08-13 DIAGNOSIS — J30.89 ENVIRONMENTAL AND SEASONAL ALLERGIES: ICD-10-CM

## 2020-08-13 PROCEDURE — 95117 IMMUNOTHERAPY INJECTIONS: CPT | Performed by: ALLERGY & IMMUNOLOGY

## 2020-09-10 ENCOUNTER — NURSE ONLY (OUTPATIENT)
Dept: ALLERGY | Facility: CLINIC | Age: 45
End: 2020-09-10
Payer: COMMERCIAL

## 2020-09-10 DIAGNOSIS — J30.89 ENVIRONMENTAL AND SEASONAL ALLERGIES: ICD-10-CM

## 2020-09-10 PROCEDURE — 95117 IMMUNOTHERAPY INJECTIONS: CPT | Performed by: ALLERGY & IMMUNOLOGY

## 2020-09-11 DIAGNOSIS — I10 ESSENTIAL HYPERTENSION: ICD-10-CM

## 2020-09-11 RX ORDER — LISINOPRIL AND HYDROCHLOROTHIAZIDE 12.5; 1 MG/1; MG/1
TABLET ORAL
Qty: 90 TABLET | Refills: 0 | Status: SHIPPED | OUTPATIENT
Start: 2020-09-11 | End: 2020-12-17

## 2020-10-08 ENCOUNTER — NURSE ONLY (OUTPATIENT)
Dept: ALLERGY | Facility: CLINIC | Age: 45
End: 2020-10-08
Payer: COMMERCIAL

## 2020-10-08 DIAGNOSIS — J30.89 ENVIRONMENTAL AND SEASONAL ALLERGIES: ICD-10-CM

## 2020-10-08 PROCEDURE — 95117 IMMUNOTHERAPY INJECTIONS: CPT | Performed by: ALLERGY & IMMUNOLOGY

## 2020-11-04 RX ORDER — ATORVASTATIN CALCIUM 10 MG/1
TABLET, FILM COATED ORAL
Qty: 90 TABLET | Refills: 0 | Status: SHIPPED | OUTPATIENT
Start: 2020-11-04 | End: 2021-01-26

## 2020-11-05 ENCOUNTER — NURSE ONLY (OUTPATIENT)
Dept: ALLERGY | Facility: CLINIC | Age: 45
End: 2020-11-05
Payer: COMMERCIAL

## 2020-11-05 DIAGNOSIS — J30.89 ENVIRONMENTAL AND SEASONAL ALLERGIES: ICD-10-CM

## 2020-11-05 PROCEDURE — 95165 ANTIGEN THERAPY SERVICES: CPT | Performed by: ALLERGY & IMMUNOLOGY

## 2020-11-05 PROCEDURE — 95117 IMMUNOTHERAPY INJECTIONS: CPT | Performed by: ALLERGY & IMMUNOLOGY

## 2020-12-03 ENCOUNTER — NURSE ONLY (OUTPATIENT)
Dept: ALLERGY | Facility: CLINIC | Age: 45
End: 2020-12-03
Payer: COMMERCIAL

## 2020-12-03 DIAGNOSIS — J30.89 ENVIRONMENTAL AND SEASONAL ALLERGIES: ICD-10-CM

## 2020-12-03 PROCEDURE — 95117 IMMUNOTHERAPY INJECTIONS: CPT | Performed by: ALLERGY & IMMUNOLOGY

## 2020-12-17 DIAGNOSIS — I10 ESSENTIAL HYPERTENSION: ICD-10-CM

## 2020-12-17 RX ORDER — LISINOPRIL AND HYDROCHLOROTHIAZIDE 12.5; 1 MG/1; MG/1
TABLET ORAL
Qty: 90 TABLET | Refills: 0 | Status: SHIPPED | OUTPATIENT
Start: 2020-12-17 | End: 2021-03-11

## 2020-12-31 ENCOUNTER — NURSE ONLY (OUTPATIENT)
Dept: ALLERGY | Facility: CLINIC | Age: 45
End: 2020-12-31
Payer: COMMERCIAL

## 2020-12-31 DIAGNOSIS — J30.89 ENVIRONMENTAL AND SEASONAL ALLERGIES: ICD-10-CM

## 2020-12-31 PROCEDURE — 95117 IMMUNOTHERAPY INJECTIONS: CPT | Performed by: ALLERGY & IMMUNOLOGY

## 2021-01-26 RX ORDER — ATORVASTATIN CALCIUM 10 MG/1
TABLET, FILM COATED ORAL
Qty: 90 TABLET | Refills: 0 | Status: SHIPPED | OUTPATIENT
Start: 2021-01-26 | End: 2021-04-23

## 2021-01-28 ENCOUNTER — NURSE ONLY (OUTPATIENT)
Dept: ALLERGY | Facility: CLINIC | Age: 46
End: 2021-01-28
Payer: COMMERCIAL

## 2021-01-28 DIAGNOSIS — J30.89 ENVIRONMENTAL AND SEASONAL ALLERGIES: ICD-10-CM

## 2021-01-28 PROCEDURE — 95117 IMMUNOTHERAPY INJECTIONS: CPT | Performed by: ALLERGY & IMMUNOLOGY

## 2021-02-25 ENCOUNTER — OFFICE VISIT (OUTPATIENT)
Dept: ALLERGY | Facility: CLINIC | Age: 46
End: 2021-02-25
Payer: COMMERCIAL

## 2021-02-25 ENCOUNTER — NURSE ONLY (OUTPATIENT)
Dept: ALLERGY | Facility: CLINIC | Age: 46
End: 2021-02-25
Payer: COMMERCIAL

## 2021-02-25 VITALS
RESPIRATION RATE: 17 BRPM | DIASTOLIC BLOOD PRESSURE: 84 MMHG | HEART RATE: 81 BPM | SYSTOLIC BLOOD PRESSURE: 137 MMHG | OXYGEN SATURATION: 100 %

## 2021-02-25 DIAGNOSIS — Z91.09 ENVIRONMENTAL ALLERGIES: ICD-10-CM

## 2021-02-25 DIAGNOSIS — J30.89 ENVIRONMENTAL AND SEASONAL ALLERGIES: ICD-10-CM

## 2021-02-25 DIAGNOSIS — J30.1 SEASONAL ALLERGIC RHINITIS DUE TO POLLEN: Primary | ICD-10-CM

## 2021-02-25 PROCEDURE — 99213 OFFICE O/P EST LOW 20 MIN: CPT | Performed by: ALLERGY & IMMUNOLOGY

## 2021-02-25 PROCEDURE — 95117 IMMUNOTHERAPY INJECTIONS: CPT | Performed by: ALLERGY & IMMUNOLOGY

## 2021-02-25 PROCEDURE — 3075F SYST BP GE 130 - 139MM HG: CPT | Performed by: ALLERGY & IMMUNOLOGY

## 2021-02-25 PROCEDURE — 3079F DIAST BP 80-89 MM HG: CPT | Performed by: ALLERGY & IMMUNOLOGY

## 2021-02-25 NOTE — PROGRESS NOTES
Bobbetta Sicard is a 54year old male. HPI:   Patient presents with: Allergies: Pt reports his allergies have been well controlled. He feels like his sympotms have improved since starting allergy injections.  Pt reports he does notice some indoor symp visit):  Current Outpatient Medications   Medication Sig Dispense Refill   • ATORVASTATIN 10 MG Oral Tab TAKE 1 TABLET BY MOUTH EVERY DAY AT NIGHT 90 tablet 0   • LISINOPRIL-HYDROCHLOROTHIAZIDE 10-12.5 MG Oral Tab TAKE 1 TABLET BY MOUTH EVERY DAY 90 tablet immunotherapy every 4 weeks. Reviewed with patient he has been on maintenance dosing since June 2019. Reviewed currently recommended 3 to 5 years of maintenance dosing. Current immunotherapy is to trees grass ragweed weeds and mold.   Patient received im

## 2021-03-11 DIAGNOSIS — I10 ESSENTIAL HYPERTENSION: ICD-10-CM

## 2021-03-11 RX ORDER — LISINOPRIL AND HYDROCHLOROTHIAZIDE 12.5; 1 MG/1; MG/1
1 TABLET ORAL DAILY
Qty: 30 TABLET | Refills: 0 | Status: SHIPPED | OUTPATIENT
Start: 2021-03-11 | End: 2021-05-18

## 2021-03-11 NOTE — TELEPHONE ENCOUNTER
Current Outpatient Medications:   •  LISINOPRIL-HYDROCHLOROTHIAZIDE 10-12.5 MG Oral Tab, TAKE 1 TABLET BY MOUTH EVERY DAY, Disp: 90 tablet, Rfl: 0

## 2021-03-25 ENCOUNTER — NURSE ONLY (OUTPATIENT)
Dept: ALLERGY | Facility: CLINIC | Age: 46
End: 2021-03-25
Payer: COMMERCIAL

## 2021-03-25 DIAGNOSIS — J30.89 ENVIRONMENTAL AND SEASONAL ALLERGIES: ICD-10-CM

## 2021-03-25 PROCEDURE — 95117 IMMUNOTHERAPY INJECTIONS: CPT | Performed by: ALLERGY & IMMUNOLOGY

## 2021-03-25 PROCEDURE — 95165 ANTIGEN THERAPY SERVICES: CPT | Performed by: ALLERGY & IMMUNOLOGY

## 2021-04-22 ENCOUNTER — NURSE ONLY (OUTPATIENT)
Dept: ALLERGY | Facility: CLINIC | Age: 46
End: 2021-04-22
Payer: COMMERCIAL

## 2021-04-22 DIAGNOSIS — J30.89 ENVIRONMENTAL AND SEASONAL ALLERGIES: ICD-10-CM

## 2021-04-22 PROCEDURE — 95117 IMMUNOTHERAPY INJECTIONS: CPT | Performed by: ALLERGY & IMMUNOLOGY

## 2021-04-23 RX ORDER — ATORVASTATIN CALCIUM 10 MG/1
10 TABLET, FILM COATED ORAL NIGHTLY
Qty: 90 TABLET | Refills: 0 | Status: SHIPPED | OUTPATIENT
Start: 2021-04-23 | End: 2021-05-18

## 2021-04-23 NOTE — TELEPHONE ENCOUNTER
Current Outpatient Medications:   •  ATORVASTATIN 10 MG Oral Tab, TAKE 1 TABLET BY MOUTH EVERY DAY AT NIGHT, Disp: 90 tablet, Rfl: 0

## 2021-05-08 NOTE — TELEPHONE ENCOUNTER
Orthopaedic Surgery Daily Progress Note    Date of Service:   05/08/21    s/p L4-S1 PSF, Date of Procedure:5/4/21    Subjective:   76 year old male who presents s/p above procedure doing well postoperatively  Pain well-controlled  PT/OT following patient  Denies fevers, chills, new numbness/tingling, weakness  Would like to see podiatry right great toe. Explained they likely will not see on weekend.      Objective:  Physical Exam:  Gen: Well developed, well nourished, resting comfortably in no acute distress  Skin: Warm, dry  Head: Normocephalic, atraumatic  Neck: Supple, no tenderness  Eye: EOMI, normal conjunctiva  Ears/Nose/Throat: Moist mucous membranes  CV: Regular rate. Palpable radial pulses bilaterally. Brisk capillary refill <2 seconds in BUE.  Resp: Respirations are non-labored   MSK:   Moving sarah UE and LE , good strength  SILT all dermatomal distributions, feet WWP  Neuro: Alert and oriented to person, place, time and situation. Normal speech observed. No facial droop. Cranial Nerves II-XII are grossly normal  Psychiatric: Cooperative, Appropriate mood and affect, normal judgment    Labs:  .  Visit Vitals  /46 (BP Location: RUE - Right upper extremity, Patient Position: Supine)   Pulse 61   Temp 98.4 °F (36.9 °C) (Oral)   Resp 16   Ht 5' 8\" (1.727 m)   Wt 96.6 kg (212 lb 15.4 oz)   SpO2 97%   BMI 32.38 kg/m²       .  WBC (K/mcL)   Date Value   05/08/2021 6.3     RBC (mil/mcL)   Date Value   05/08/2021 3.08 (L)     HCT (%)   Date Value   05/08/2021 29.8 (L)     HGB (g/dL)   Date Value   05/08/2021 9.9 (L)     PLT (K/mcL)   Date Value   05/08/2021 105 (L)     .  Sodium (mmol/L)   Date Value   05/08/2021 137     Potassium (mmol/L)   Date Value   05/08/2021 4.1     Chloride (mmol/L)   Date Value   05/08/2021 108 (H)     Glucose (mg/dL)   Date Value   05/08/2021 109 (H)     Calcium (mg/dL)   Date Value   05/08/2021 9.4     Carbon Dioxide (mmol/L)   Date Value   05/08/2021 25     BUN (mg/dL)   Date Value  See message that was sent to pt below.   05/08/2021 24 (H)     Creatinine (mg/dL)   Date Value   05/08/2021 1.19 (H)     .  Date 05/07/21 0700 - 05/08/21 0659 05/08/21 0700 - 05/09/21 0659   Shift 5936-3347 9324-2718 3840-9927 24 Hour Total 6711-7299 5685-2589 2344-6506 24 Hour Total   INTAKE   P.O. 480 400  880       Shift Total(mL/kg) 480(5) 400(4.1)  880(9.1)       OUTPUT   Urine 1075        Drains 40   40         Output (ml) ([REMOVED] Drain 05/04/21 #1 Back Accordion (e.g. Hemovac, etc)) 40   40       Shift Total(mL/kg) 1115(11.5) 300(3.1) 850(8.8) 2265(23.4)       Weight (kg) 96.6 96.6 96.6 96.6 96.6 96.6 96.6 96.6        Assessment/Plan:     76 year old male who presents POD3 s/p L4-S1 PSF doing well postoperatively    -Podiatry consult Monday if patient in hospital for right great toe nail irritation  - Urology consult for urinary incontinence, their help appreciated  - Multimodal Pain Control  - Weight Bearing Status:WBAT  - PT/OT:   - Antibiotics:Periop  - DVT Prophylaxis:SCDS  - Hemovac 150/50, maintain  - General diet  -Dispo: Patient is medically stable to be transferred.  Per SW, patient accepted to [facility] in [place] if SNF is needed for further rehab. SW following to assist with rehab placement as needed once patient is stable for discharge. Clear for transfer from orthopaedics.    D/w Dr. Tammy Banks MD  Orthopaedic Surgery PGY-1  [facility] on Call [facility]

## 2021-05-18 ENCOUNTER — OFFICE VISIT (OUTPATIENT)
Dept: INTERNAL MEDICINE CLINIC | Facility: CLINIC | Age: 46
End: 2021-05-18
Payer: COMMERCIAL

## 2021-05-18 VITALS
HEIGHT: 72 IN | BODY MASS INDEX: 33.05 KG/M2 | HEART RATE: 77 BPM | SYSTOLIC BLOOD PRESSURE: 135 MMHG | WEIGHT: 244 LBS | RESPIRATION RATE: 16 BRPM | DIASTOLIC BLOOD PRESSURE: 84 MMHG

## 2021-05-18 DIAGNOSIS — Z00.00 PHYSICAL EXAM, ANNUAL: Primary | ICD-10-CM

## 2021-05-18 DIAGNOSIS — K92.1 BLOOD IN STOOL: ICD-10-CM

## 2021-05-18 PROCEDURE — 3079F DIAST BP 80-89 MM HG: CPT | Performed by: INTERNAL MEDICINE

## 2021-05-18 PROCEDURE — 3075F SYST BP GE 130 - 139MM HG: CPT | Performed by: INTERNAL MEDICINE

## 2021-05-18 PROCEDURE — 3008F BODY MASS INDEX DOCD: CPT | Performed by: INTERNAL MEDICINE

## 2021-05-18 PROCEDURE — 99396 PREV VISIT EST AGE 40-64: CPT | Performed by: INTERNAL MEDICINE

## 2021-05-18 RX ORDER — ATORVASTATIN CALCIUM 10 MG/1
10 TABLET, FILM COATED ORAL NIGHTLY
Qty: 90 TABLET | Refills: 1 | Status: SHIPPED | OUTPATIENT
Start: 2021-05-18 | End: 2022-02-01

## 2021-05-18 RX ORDER — LISINOPRIL AND HYDROCHLOROTHIAZIDE 12.5; 1 MG/1; MG/1
1 TABLET ORAL DAILY
Qty: 90 TABLET | Refills: 1 | Status: SHIPPED | OUTPATIENT
Start: 2021-05-18 | End: 2021-11-10

## 2021-05-18 NOTE — PROGRESS NOTES
Karina Castillo is a 54year old male who presents for a complete physical exam.   HPI:   Pt complains of nothing.        Immunization History  Administered            Date(s) Administered    Flulaval, 3 Years & >, IM                          09/15/2020 Date   • ARTHROSCOPY BICEPS TENODESIS Left 2012   • SHOULDER SURG PROC UNLISTED Right 12/1/2009      Family History   Problem Relation Age of Onset   • Hypertension Father    • Skin cancer Father         skin   • Other (Other) Father    • Diabetes Neg    • penile lesions  RECTAL: Prostate mildly enlarged.  STOOL HEME POSITIVE  MUSCULOSKELETAL: back is not tender,FROM of the back  EXTREMITIES: no cyanosis, clubbing or edema  NEURO: Oriented times three,cranial nerves are intact,motor and sensory are grossly in

## 2021-05-20 ENCOUNTER — NURSE ONLY (OUTPATIENT)
Dept: ALLERGY | Facility: CLINIC | Age: 46
End: 2021-05-20
Payer: COMMERCIAL

## 2021-05-20 DIAGNOSIS — J30.89 ENVIRONMENTAL AND SEASONAL ALLERGIES: ICD-10-CM

## 2021-05-20 PROCEDURE — 95117 IMMUNOTHERAPY INJECTIONS: CPT | Performed by: ALLERGY & IMMUNOLOGY

## 2021-05-27 ENCOUNTER — LAB ENCOUNTER (OUTPATIENT)
Dept: LAB | Facility: HOSPITAL | Age: 46
End: 2021-05-27
Attending: INTERNAL MEDICINE
Payer: COMMERCIAL

## 2021-05-27 DIAGNOSIS — Z00.00 PHYSICAL EXAM, ANNUAL: ICD-10-CM

## 2021-05-27 PROCEDURE — 80061 LIPID PANEL: CPT

## 2021-05-27 PROCEDURE — 84443 ASSAY THYROID STIM HORMONE: CPT

## 2021-05-27 PROCEDURE — 85025 COMPLETE CBC W/AUTO DIFF WBC: CPT

## 2021-05-27 PROCEDURE — 80053 COMPREHEN METABOLIC PANEL: CPT

## 2021-05-27 PROCEDURE — 36415 COLL VENOUS BLD VENIPUNCTURE: CPT

## 2021-05-27 PROCEDURE — 81003 URINALYSIS AUTO W/O SCOPE: CPT

## 2021-06-17 ENCOUNTER — NURSE ONLY (OUTPATIENT)
Dept: ALLERGY | Facility: CLINIC | Age: 46
End: 2021-06-17
Payer: COMMERCIAL

## 2021-06-17 DIAGNOSIS — J30.89 ENVIRONMENTAL AND SEASONAL ALLERGIES: ICD-10-CM

## 2021-06-17 PROCEDURE — 95117 IMMUNOTHERAPY INJECTIONS: CPT | Performed by: ALLERGY & IMMUNOLOGY

## 2021-07-15 ENCOUNTER — NURSE ONLY (OUTPATIENT)
Dept: ALLERGY | Facility: CLINIC | Age: 46
End: 2021-07-15
Payer: COMMERCIAL

## 2021-07-15 DIAGNOSIS — J30.89 ENVIRONMENTAL AND SEASONAL ALLERGIES: ICD-10-CM

## 2021-07-15 PROCEDURE — 95117 IMMUNOTHERAPY INJECTIONS: CPT | Performed by: ALLERGY & IMMUNOLOGY

## 2021-08-12 ENCOUNTER — NURSE ONLY (OUTPATIENT)
Dept: ALLERGY | Facility: CLINIC | Age: 46
End: 2021-08-12
Payer: COMMERCIAL

## 2021-08-12 DIAGNOSIS — J30.89 ENVIRONMENTAL AND SEASONAL ALLERGIES: ICD-10-CM

## 2021-08-12 PROCEDURE — 95165 ANTIGEN THERAPY SERVICES: CPT | Performed by: ALLERGY & IMMUNOLOGY

## 2021-08-12 PROCEDURE — 95117 IMMUNOTHERAPY INJECTIONS: CPT | Performed by: ALLERGY & IMMUNOLOGY

## 2021-09-09 ENCOUNTER — NURSE ONLY (OUTPATIENT)
Dept: ALLERGY | Facility: CLINIC | Age: 46
End: 2021-09-09
Payer: COMMERCIAL

## 2021-09-09 DIAGNOSIS — J30.89 ENVIRONMENTAL AND SEASONAL ALLERGIES: ICD-10-CM

## 2021-09-09 PROCEDURE — 95117 IMMUNOTHERAPY INJECTIONS: CPT | Performed by: ALLERGY & IMMUNOLOGY

## 2021-10-07 ENCOUNTER — NURSE ONLY (OUTPATIENT)
Dept: ALLERGY | Facility: CLINIC | Age: 46
End: 2021-10-07
Payer: COMMERCIAL

## 2021-10-07 DIAGNOSIS — J30.89 ENVIRONMENTAL AND SEASONAL ALLERGIES: ICD-10-CM

## 2021-10-07 PROCEDURE — 95117 IMMUNOTHERAPY INJECTIONS: CPT | Performed by: ALLERGY & IMMUNOLOGY

## 2021-11-04 ENCOUNTER — NURSE ONLY (OUTPATIENT)
Dept: ALLERGY | Facility: CLINIC | Age: 46
End: 2021-11-04
Payer: COMMERCIAL

## 2021-11-04 DIAGNOSIS — J30.89 ENVIRONMENTAL AND SEASONAL ALLERGIES: ICD-10-CM

## 2021-11-04 PROCEDURE — 95117 IMMUNOTHERAPY INJECTIONS: CPT | Performed by: ALLERGY & IMMUNOLOGY

## 2021-11-10 RX ORDER — LISINOPRIL AND HYDROCHLOROTHIAZIDE 12.5; 1 MG/1; MG/1
1 TABLET ORAL DAILY
Qty: 90 TABLET | Refills: 1 | Status: SHIPPED | OUTPATIENT
Start: 2021-11-10

## 2021-11-10 NOTE — TELEPHONE ENCOUNTER
Refill passed per [place] [employer] protocol. Requested Prescriptions   Pending Prescriptions Disp Refills    lisinopril-hydroCHLOROthiazide 10-12.5 MG Oral Tab 90 tablet 1     Sig: Take 1 tablet by mouth daily.         Hypertensive Medications Protocol Pas

## 2021-11-10 NOTE — TELEPHONE ENCOUNTER
Current Outpatient Medications:     Lisinopril-hydroCHLOROthiazide 10-12.5 MG Oral Tab, Take 1 tablet by mouth daily. , Disp: 90 tablet, Rfl: 1

## 2021-12-02 ENCOUNTER — NURSE ONLY (OUTPATIENT)
Dept: ALLERGY | Facility: CLINIC | Age: 46
End: 2021-12-02
Payer: COMMERCIAL

## 2021-12-02 DIAGNOSIS — J30.89 ENVIRONMENTAL AND SEASONAL ALLERGIES: ICD-10-CM

## 2021-12-02 PROCEDURE — 95117 IMMUNOTHERAPY INJECTIONS: CPT | Performed by: ALLERGY & IMMUNOLOGY

## 2021-12-23 ENCOUNTER — NURSE ONLY (OUTPATIENT)
Dept: ALLERGY | Facility: CLINIC | Age: 46
End: 2021-12-23
Payer: COMMERCIAL

## 2021-12-23 DIAGNOSIS — J30.89 ENVIRONMENTAL AND SEASONAL ALLERGIES: ICD-10-CM

## 2021-12-23 PROCEDURE — 95117 IMMUNOTHERAPY INJECTIONS: CPT | Performed by: ALLERGY & IMMUNOLOGY

## 2021-12-23 PROCEDURE — 95165 ANTIGEN THERAPY SERVICES: CPT | Performed by: ALLERGY & IMMUNOLOGY

## 2022-01-20 ENCOUNTER — TELEPHONE (OUTPATIENT)
Dept: GASTROENTEROLOGY | Facility: CLINIC | Age: 47
End: 2022-01-20

## 2022-01-20 ENCOUNTER — OFFICE VISIT (OUTPATIENT)
Dept: GASTROENTEROLOGY | Facility: CLINIC | Age: 47
End: 2022-01-20
Payer: COMMERCIAL

## 2022-01-20 ENCOUNTER — NURSE ONLY (OUTPATIENT)
Dept: ALLERGY | Facility: CLINIC | Age: 47
End: 2022-01-20
Payer: COMMERCIAL

## 2022-01-20 VITALS
WEIGHT: 246 LBS | HEIGHT: 72 IN | SYSTOLIC BLOOD PRESSURE: 127 MMHG | BODY MASS INDEX: 33.32 KG/M2 | DIASTOLIC BLOOD PRESSURE: 81 MMHG | HEART RATE: 67 BPM

## 2022-01-20 DIAGNOSIS — J30.89 ENVIRONMENTAL AND SEASONAL ALLERGIES: ICD-10-CM

## 2022-01-20 DIAGNOSIS — Z12.11 COLON CANCER SCREENING: Primary | ICD-10-CM

## 2022-01-20 DIAGNOSIS — Z12.11 COLON CANCER SCREENING: ICD-10-CM

## 2022-01-20 DIAGNOSIS — K62.5 RECTAL BLEEDING: Primary | ICD-10-CM

## 2022-01-20 DIAGNOSIS — K62.5 RECTAL BLEEDING: ICD-10-CM

## 2022-01-20 PROCEDURE — 3079F DIAST BP 80-89 MM HG: CPT | Performed by: INTERNAL MEDICINE

## 2022-01-20 PROCEDURE — 3008F BODY MASS INDEX DOCD: CPT | Performed by: INTERNAL MEDICINE

## 2022-01-20 PROCEDURE — 99242 OFF/OP CONSLTJ NEW/EST SF 20: CPT | Performed by: INTERNAL MEDICINE

## 2022-01-20 PROCEDURE — 95117 IMMUNOTHERAPY INJECTIONS: CPT | Performed by: ALLERGY & IMMUNOLOGY

## 2022-01-20 PROCEDURE — 3074F SYST BP LT 130 MM HG: CPT | Performed by: INTERNAL MEDICINE

## 2022-01-20 NOTE — PROGRESS NOTES
Fela Syed is a 51year old male.     HPI:   Patient presents with:  Consult: intermittent rectal bleeding; no prior colonoscopy    The patient is a 59-year-old male who has a history of hemorrhoids, high blood pressure, high cholesterol who presents mg by mouth daily as needed for Allergies. • Ergocalciferol (VITAMIN D OR) Take 1 tablet by mouth daily.          Allergies:    Seasonal                Runny nose      ROS:   The patient denies any chest pain or shortness of breath,  No neurologic or de orders of the defined types were placed in this encounter.       Meds This Visit:  Requested Prescriptions      No prescriptions requested or ordered in this encounter       Imaging & Referrals:  None       Spike Maldonado MD  [facility]

## 2022-01-20 NOTE — TELEPHONE ENCOUNTER
Scheduled for:  Colonoscopy 39330   Provider Name:    Date:  3/4/2022  Location:   [facility]  Sedation:  Mac   Time:  2:30 Pm (pt is aware to arrive at 1:30 Pm )   Prep:  Colyte Prep instructions were given to pt in the office, pt verbalized understandi

## 2022-02-01 RX ORDER — ATORVASTATIN CALCIUM 10 MG/1
10 TABLET, FILM COATED ORAL NIGHTLY
Qty: 90 TABLET | Refills: 1 | Status: SHIPPED | OUTPATIENT
Start: 2022-02-01

## 2022-02-01 NOTE — TELEPHONE ENCOUNTER
Refill passed per [facility] protocol. Requested Prescriptions   Pending Prescriptions Disp Refills    atorvastatin 10 MG Oral Tab 90 tablet 1     Sig: Take 1 tablet (10 mg total) by mouth nightly.         Cholesterol Medication Protocol Passed - 2/1/2022  1:02 PM        Passed - ALT in past 12 months        Passed - LDL in past 12 months        Passed - Last ALT < 80       Lab Results   Component Value Date    ALT 46 05/27/2021             Passed - Last LDL < 130     Lab Results   Component Value Date    LDL 77 05/27/2021               Passed - Appointment in past 12 or next 3 months               Future Appointments         Provider Department Appt Notes    In 2 weeks Abbie Guzman MD [place] [facility] [place], [employer], [place] Allergy f/u and Allergy Injections    In 1 month [facility], PROCEDURE [facility] GI PROCEDURE Colon w/Mac @[facility]            Recent Outpatient Visits              1 week ago Environmental and seasonal allergies    [place] [facility] [place], [employer], [place]    Nurse Only    1 week ago Colon cancer screening    [place] [facility] [place], [address] [facility], [place] Basilio MD    Office Visit    1 month ago Environmental and seasonal allergies    [place] [facility], [employer], [place]    Nurse Only    2 months ago Environmental and seasonal allergies    [place] [facility], [employer], [place]    Nurse Only    2 months ago Environmental and seasonal allergies    [place] [facility], [employer], [place]    Nurse Only

## 2022-02-17 ENCOUNTER — NURSE ONLY (OUTPATIENT)
Dept: ALLERGY | Facility: CLINIC | Age: 47
End: 2022-02-17
Payer: COMMERCIAL

## 2022-02-17 ENCOUNTER — OFFICE VISIT (OUTPATIENT)
Dept: ALLERGY | Facility: CLINIC | Age: 47
End: 2022-02-17
Payer: COMMERCIAL

## 2022-02-17 VITALS
HEIGHT: 72 IN | OXYGEN SATURATION: 100 % | BODY MASS INDEX: 33.32 KG/M2 | SYSTOLIC BLOOD PRESSURE: 138 MMHG | HEART RATE: 69 BPM | WEIGHT: 246 LBS | DIASTOLIC BLOOD PRESSURE: 82 MMHG

## 2022-02-17 DIAGNOSIS — Z23 FLU VACCINE NEED: ICD-10-CM

## 2022-02-17 DIAGNOSIS — Z23 COVID-19 VACCINE SERIES STARTED: ICD-10-CM

## 2022-02-17 DIAGNOSIS — J30.89 ENVIRONMENTAL AND SEASONAL ALLERGIES: ICD-10-CM

## 2022-02-17 DIAGNOSIS — J30.1 SEASONAL ALLERGIC RHINITIS DUE TO POLLEN: Primary | ICD-10-CM

## 2022-02-17 DIAGNOSIS — Z91.09 ENVIRONMENTAL ALLERGIES: ICD-10-CM

## 2022-02-17 PROCEDURE — 3075F SYST BP GE 130 - 139MM HG: CPT | Performed by: ALLERGY & IMMUNOLOGY

## 2022-02-17 PROCEDURE — 99213 OFFICE O/P EST LOW 20 MIN: CPT | Performed by: ALLERGY & IMMUNOLOGY

## 2022-02-17 PROCEDURE — 95117 IMMUNOTHERAPY INJECTIONS: CPT | Performed by: ALLERGY & IMMUNOLOGY

## 2022-02-17 PROCEDURE — 3079F DIAST BP 80-89 MM HG: CPT | Performed by: ALLERGY & IMMUNOLOGY

## 2022-02-17 PROCEDURE — 3008F BODY MASS INDEX DOCD: CPT | Performed by: ALLERGY & IMMUNOLOGY

## 2022-02-17 RX ORDER — POLYETHYLENE GLYCOL-3350 AND ELECTROLYTES 236; 6.74; 5.86; 2.97; 22.74 G/274.31G; G/274.31G; G/274.31G; G/274.31G; G/274.31G
4000 POWDER, FOR SOLUTION ORAL ONCE
COMMUNITY
Start: 2022-01-20

## 2022-02-17 NOTE — PATIENT INSTRUCTIONS
#1 allergic rhinitis  Overall improved with maintenance dose immunotherapy. Patient has been on maintenance dosing every 4 weeks since June 2019. Reviewed currently recommended 3 to 5 years of maintenance dosing. Current immunotherapy is to trees grass ragweed weeds and mold. Using Zyrtec as needed at this time. Denies recurrent sinus infections  Continue with current regimen  Reviewed avoidance measures    #2 COVID vaccine up-to-date x2 doses.   Reviewed with patient that he has booster eligible and recommended booster vaccination    #3 flu vaccine up-to-date per patient report    Follow-up in 1 year or sooner if needed

## 2022-03-01 ENCOUNTER — LAB ENCOUNTER (OUTPATIENT)
Dept: LAB | Facility: HOSPITAL | Age: 47
End: 2022-03-01
Attending: INTERNAL MEDICINE
Payer: COMMERCIAL

## 2022-03-01 DIAGNOSIS — Z01.818 PRE-OP TESTING: ICD-10-CM

## 2022-03-02 LAB — SARS-COV-2 RNA RESP QL NAA+PROBE: NOT DETECTED

## 2022-03-04 ENCOUNTER — ANESTHESIA (OUTPATIENT)
Dept: ENDOSCOPY | Age: 47
End: 2022-03-04
Payer: COMMERCIAL

## 2022-03-04 ENCOUNTER — HOSPITAL ENCOUNTER (OUTPATIENT)
Age: 47
Setting detail: HOSPITAL OUTPATIENT SURGERY
Discharge: HOME | End: 2022-03-04
Attending: INTERNAL MEDICINE | Admitting: INTERNAL MEDICINE
Payer: COMMERCIAL

## 2022-03-04 ENCOUNTER — ANESTHESIA EVENT (OUTPATIENT)
Dept: ENDOSCOPY | Age: 47
End: 2022-03-04
Payer: COMMERCIAL

## 2022-03-04 VITALS
BODY MASS INDEX: 33.32 KG/M2 | OXYGEN SATURATION: 100 % | HEART RATE: 73 BPM | WEIGHT: 246 LBS | DIASTOLIC BLOOD PRESSURE: 77 MMHG | SYSTOLIC BLOOD PRESSURE: 117 MMHG | RESPIRATION RATE: 10 BRPM | HEIGHT: 72 IN

## 2022-03-04 DIAGNOSIS — Z12.11 COLON CANCER SCREENING: ICD-10-CM

## 2022-03-04 DIAGNOSIS — Z01.818 PRE-OP TESTING: Primary | ICD-10-CM

## 2022-03-04 DIAGNOSIS — K62.5 RECTAL BLEEDING: ICD-10-CM

## 2022-03-04 PROCEDURE — 45385 COLONOSCOPY W/LESION REMOVAL: CPT | Performed by: INTERNAL MEDICINE

## 2022-03-04 PROCEDURE — 88305 TISSUE EXAM BY PATHOLOGIST: CPT | Performed by: INTERNAL MEDICINE

## 2022-03-04 PROCEDURE — 99070 SPECIAL SUPPLIES PHYS/QHP: CPT | Performed by: INTERNAL MEDICINE

## 2022-03-04 PROCEDURE — 45380 COLONOSCOPY AND BIOPSY: CPT | Performed by: INTERNAL MEDICINE

## 2022-03-04 RX ORDER — SODIUM CHLORIDE, SODIUM LACTATE, POTASSIUM CHLORIDE, CALCIUM CHLORIDE 600; 310; 30; 20 MG/100ML; MG/100ML; MG/100ML; MG/100ML
INJECTION, SOLUTION INTRAVENOUS CONTINUOUS
Status: DISCONTINUED | OUTPATIENT
Start: 2022-03-04 | End: 2022-03-04

## 2022-03-04 RX ORDER — LIDOCAINE HYDROCHLORIDE 10 MG/ML
INJECTION, SOLUTION EPIDURAL; INFILTRATION; INTRACAUDAL; PERINEURAL AS NEEDED
Status: DISCONTINUED | OUTPATIENT
Start: 2022-03-04 | End: 2022-03-04 | Stop reason: SURG

## 2022-03-04 RX ADMIN — SODIUM CHLORIDE, SODIUM LACTATE, POTASSIUM CHLORIDE, CALCIUM CHLORIDE: 600; 310; 30; 20 INJECTION, SOLUTION INTRAVENOUS at 14:55:00

## 2022-03-04 RX ADMIN — SODIUM CHLORIDE, SODIUM LACTATE, POTASSIUM CHLORIDE, CALCIUM CHLORIDE: 600; 310; 30; 20 INJECTION, SOLUTION INTRAVENOUS at 14:25:00

## 2022-03-04 RX ADMIN — LIDOCAINE HYDROCHLORIDE 25 MG: 10 INJECTION, SOLUTION EPIDURAL; INFILTRATION; INTRACAUDAL; PERINEURAL at 14:26:00

## 2022-03-04 NOTE — OPERATIVE REPORT
[facility] Endoscopy Report      Preoperative Diagnosis:  - colon cancer screening  - rectal bleeding      Postoperative Diagnosis:  - colon polyps x 5  - diverticulosis  - internal hemorrhoids      Procedure:    Colonoscopy         Surgeon:  Benson Levy M.D. Anesthesia:  MAC sedation    Technique:  After informed consent, the patient was placed in the left lateral recumbent position. Digital rectal examination revealed no palpable intraluminal abnormalities. An Olympus variable stiffness 190 series HD colonoscope was inserted into the rectum and advanced under direct vision by following the lumen to the cecum. The colon was examined upon withdrawal in the left lateral position. The procedure was well tolerated without immediate complication. Findings:  The preparation of the colon was good. The terminal ileum was examined for 4 cm and visually normal.  The ileocecal valve was well preserved. The visualized colonic mucosa from the cecum to the anal verge was normal with an intact vascular pattern. Colon polyps x5 removed as follows;  -Transverse x2, sessile 3 mm in size and cold snare removed. -Descending x1, sessile 3 mm in size in cold snare removed. -Sigmoid x1, sessile 4 mm in size and cold snare removed. -Rectum x1, diminutive removed by cold forceps technique. All polypectomy sites inspected and found to be free of bleeding specimens retrieved and sent for analysis. Diverticulosis located the sigmoid colon, no evidence of diverticulitis. Internal hemorrhoids noted on retroflexed view. Impression:  - colon polyps x 5  - diverticulosis  - internal hemorrhoids    Recommendations:  - Post polypectomy instructions given  - Repeat colonoscopy in 3- 10 years  - High fiber diet for diverticular disease  - Symptomatic treatment of hemorrhoids          Sanjuana Worthy.  Zoie Moreno MD  3/4/2022  2:56 PM

## 2022-03-04 NOTE — ANESTHESIA POSTPROCEDURE EVALUATION
Patient: Siena Goetz    Procedure Summary     Date: 03/04/22 Room / Location: [facility] ENDOSCOPY 01 / [facility] ENDO    Anesthesia Start: 0122 Anesthesia Stop: 2112    Procedure: COLONOSCOPY (N/A ) Diagnosis:       Rectal bleeding      Colon cancer screening      (hemorrhoids, polyps, diverticulosis)    Surgeons: Lizbeth Sandoval MD Anesthesiologist:     Anesthesia Type: MAC ASA Status: 2          Anesthesia Type: MAC    Vitals Value Taken Time   BP 97/65 03/04/22 1502   Temp 98 03/04/22 1502   Pulse 66 03/04/22 1502   Resp 16 03/04/22 1502   SpO2 96 03/04/22 1502       [facility] AN Post Evaluation:   Patient Evaluated in PACU  Patient Participation: complete - patient participated  Level of Consciousness: awake  Pain Management: adequate  Airway Patency:patent  Dental exam unchanged from preop  Yes    Cardiovascular Status: acceptable  Respiratory Status: acceptable  Postoperative Hydration acceptable      José Miguel Barnard MD  3/4/2022 3:02 PM

## 2022-03-07 ENCOUNTER — TELEPHONE (OUTPATIENT)
Dept: GASTROENTEROLOGY | Facility: CLINIC | Age: 47
End: 2022-03-07

## 2022-03-07 NOTE — TELEPHONE ENCOUNTER
----- Message from Araseli Pike MD sent at 3/7/2022 11:47 AM CST -----  I wanted to get back to you with your colonoscopy results. You had 5 colon polyps removed which were benign. I would advise a repeat colonoscopy in 7 years to make sure no new polyps are forming. You also have internal hemorrhoids and diverticulosis. Please stay on a high fiber diet and call with any questions.

## 2022-03-07 NOTE — TELEPHONE ENCOUNTER
Health Maintenance Updated. 7 year colonoscopy recall entered into patient outreach in [facility]. Next colonoscopy will be due 3/4/2029.     Patient viewed below result note in MyChart:  Seen by patient Glenna Laurent on 3/7/2022 11:48 AM

## 2022-03-17 ENCOUNTER — NURSE ONLY (OUTPATIENT)
Dept: ALLERGY | Facility: CLINIC | Age: 47
End: 2022-03-17
Payer: COMMERCIAL

## 2022-03-17 DIAGNOSIS — J30.89 ENVIRONMENTAL AND SEASONAL ALLERGIES: ICD-10-CM

## 2022-03-17 PROCEDURE — 95117 IMMUNOTHERAPY INJECTIONS: CPT | Performed by: ALLERGY & IMMUNOLOGY

## 2022-04-14 ENCOUNTER — NURSE ONLY (OUTPATIENT)
Dept: ALLERGY | Facility: CLINIC | Age: 47
End: 2022-04-14
Payer: COMMERCIAL

## 2022-04-14 DIAGNOSIS — J30.89 ENVIRONMENTAL AND SEASONAL ALLERGIES: ICD-10-CM

## 2022-04-14 PROCEDURE — 95117 IMMUNOTHERAPY INJECTIONS: CPT | Performed by: ALLERGY & IMMUNOLOGY

## 2022-05-03 RX ORDER — LISINOPRIL AND HYDROCHLOROTHIAZIDE 12.5; 1 MG/1; MG/1
1 TABLET ORAL DAILY
Qty: 90 TABLET | Refills: 0 | Status: SHIPPED | OUTPATIENT
Start: 2022-05-03

## 2022-05-03 NOTE — TELEPHONE ENCOUNTER
Refill passed per [facility] protocol. 90 day refill given on 05/03/22, appointment needed for further refills.     Requested Prescriptions   Pending Prescriptions Disp Refills    LISINOPRIL-HYDROCHLOROTHIAZIDE 10-12.5 MG Oral Tab [Pharmacy Med Name: LISINOPRIL-HCTZ 10-12.5 MG TAB] 90 tablet 1     Sig: TAKE 1 TABLET BY MOUTH EVERY DAY        Hypertensive Medications Protocol Failed - 5/1/2022  8:45 AM        Failed - Appointment in past 6 or next 3 months        Passed - CMP or BMP in past 12 months        Passed - GFR Non- > 50     Lab Results   Component Value Date    GFRNAA 74 05/27/2021                      Recent Outpatient Visits              2 weeks ago [place] and [place] allergies    [place] [facility], [address] Only    1 month ago Environmental and seasonal allergies    [place] [employer] [facility], [address]    Nurse Only    2 months ago Environmental and seasonal allergies    [facility], [address]    Nurse Only    2 months ago Seasonal allergic rhinitis due to pollen    [facility], [address] Dalbert Kidd, MD    Office Visit    3 months ago Environmental and seasonal allergies    [facility], [address]    Nurse Only           Future Appointments         Provider Department Appt Notes    In 1 week [address]     In 1 month [address], [place] allergy shots    In 2 months [address], [place] allergy shots

## 2022-05-03 NOTE — TELEPHONE ENCOUNTER
Refill authorized per protocol. Routing to CSS to assist patient with appointment scheduling  MyChart message sent.

## 2022-05-12 ENCOUNTER — NURSE ONLY (OUTPATIENT)
Dept: ALLERGY | Facility: CLINIC | Age: 47
End: 2022-05-12
Payer: COMMERCIAL

## 2022-05-12 DIAGNOSIS — J30.89 ENVIRONMENTAL AND SEASONAL ALLERGIES: ICD-10-CM

## 2022-05-12 PROCEDURE — 95117 IMMUNOTHERAPY INJECTIONS: CPT | Performed by: ALLERGY & IMMUNOLOGY

## 2022-05-12 PROCEDURE — 95165 ANTIGEN THERAPY SERVICES: CPT | Performed by: ALLERGY & IMMUNOLOGY

## 2022-06-09 ENCOUNTER — NURSE ONLY (OUTPATIENT)
Dept: ALLERGY | Facility: CLINIC | Age: 47
End: 2022-06-09
Payer: COMMERCIAL

## 2022-06-09 DIAGNOSIS — J30.89 ENVIRONMENTAL AND SEASONAL ALLERGIES: ICD-10-CM

## 2022-06-09 PROCEDURE — 95117 IMMUNOTHERAPY INJECTIONS: CPT | Performed by: ALLERGY & IMMUNOLOGY

## 2022-06-22 ENCOUNTER — TELEPHONE (OUTPATIENT)
Dept: INTERNAL MEDICINE CLINIC | Facility: CLINIC | Age: 47
End: 2022-06-22

## 2022-06-22 ENCOUNTER — OFFICE VISIT (OUTPATIENT)
Dept: INTERNAL MEDICINE CLINIC | Facility: CLINIC | Age: 47
End: 2022-06-22
Payer: COMMERCIAL

## 2022-06-22 VITALS
OXYGEN SATURATION: 98 % | SYSTOLIC BLOOD PRESSURE: 124 MMHG | HEIGHT: 72 IN | RESPIRATION RATE: 16 BRPM | BODY MASS INDEX: 32.78 KG/M2 | WEIGHT: 242 LBS | DIASTOLIC BLOOD PRESSURE: 84 MMHG | HEART RATE: 72 BPM

## 2022-06-22 DIAGNOSIS — Z00.00 ADULT GENERAL MEDICAL EXAM: Primary | ICD-10-CM

## 2022-06-22 PROBLEM — K92.1 BLOOD IN STOOL: Status: RESOLVED | Noted: 2021-05-18 | Resolved: 2022-06-22

## 2022-06-22 PROCEDURE — 3074F SYST BP LT 130 MM HG: CPT | Performed by: INTERNAL MEDICINE

## 2022-06-22 PROCEDURE — 3008F BODY MASS INDEX DOCD: CPT | Performed by: INTERNAL MEDICINE

## 2022-06-22 PROCEDURE — 3079F DIAST BP 80-89 MM HG: CPT | Performed by: INTERNAL MEDICINE

## 2022-06-22 PROCEDURE — 99396 PREV VISIT EST AGE 40-64: CPT | Performed by: INTERNAL MEDICINE

## 2022-06-22 RX ORDER — ACETAMINOPHEN 160 MG
2000 TABLET,DISINTEGRATING ORAL DAILY
COMMUNITY

## 2022-06-22 RX ORDER — LISINOPRIL AND HYDROCHLOROTHIAZIDE 12.5; 1 MG/1; MG/1
1 TABLET ORAL DAILY
Qty: 90 TABLET | Refills: 3 | Status: SHIPPED | OUTPATIENT
Start: 2022-06-22

## 2022-06-22 RX ORDER — ATORVASTATIN CALCIUM 10 MG/1
10 TABLET, FILM COATED ORAL NIGHTLY
Qty: 90 TABLET | Refills: 3 | Status: SHIPPED | OUTPATIENT
Start: 2022-06-22

## 2022-06-22 NOTE — TELEPHONE ENCOUNTER
Patient has no history of diabetes. He has never taken diabetes medication. I have reviewed his chart and I have confirmed this with the patient. Please remove him from diabetes registry.   Thank you

## 2022-06-23 ENCOUNTER — LAB ENCOUNTER (OUTPATIENT)
Dept: LAB | Facility: HOSPITAL | Age: 47
End: 2022-06-23
Attending: INTERNAL MEDICINE
Payer: COMMERCIAL

## 2022-06-23 DIAGNOSIS — Z00.00 ADULT GENERAL MEDICAL EXAM: ICD-10-CM

## 2022-06-23 LAB
ALBUMIN SERPL-MCNC: 4.4 G/DL (ref 3.4–5)
ALBUMIN/GLOB SERPL: 1.3 {RATIO} (ref 1–2)
ALP LIVER SERPL-CCNC: 60 U/L
ALT SERPL-CCNC: 46 U/L
ANION GAP SERPL CALC-SCNC: 7 MMOL/L (ref 0–18)
AST SERPL-CCNC: 29 U/L (ref 15–37)
BILIRUB SERPL-MCNC: 0.6 MG/DL (ref 0.1–2)
BUN BLD-MCNC: 20 MG/DL (ref 7–18)
BUN/CREAT SERPL: 15.9 (ref 10–20)
CALCIUM BLD-MCNC: 9.6 MG/DL (ref 8.5–10.1)
CHLORIDE SERPL-SCNC: 104 MMOL/L (ref 98–112)
CHOLEST SERPL-MCNC: 142 MG/DL (ref ?–200)
CO2 SERPL-SCNC: 28 MMOL/L (ref 21–32)
COMPLEXED PSA SERPL-MCNC: 0.91 NG/ML (ref ?–4)
CREAT BLD-MCNC: 1.26 MG/DL
DEPRECATED RDW RBC AUTO: 42.7 FL (ref 35.1–46.3)
ERYTHROCYTE [DISTWIDTH] IN BLOOD BY AUTOMATED COUNT: 11.7 % (ref 11–15)
EST. AVERAGE GLUCOSE BLD GHB EST-MCNC: 123 MG/DL (ref 68–126)
FASTING PATIENT LIPID ANSWER: YES
FASTING STATUS PATIENT QL REPORTED: YES
GLOBULIN PLAS-MCNC: 3.4 G/DL (ref 2.8–4.4)
GLUCOSE BLD-MCNC: 102 MG/DL (ref 70–99)
HBA1C MFR BLD: 5.9 % (ref ?–5.7)
HCT VFR BLD AUTO: 43.8 %
HDLC SERPL-MCNC: 50 MG/DL (ref 40–59)
HGB BLD-MCNC: 14.9 G/DL
LDLC SERPL CALC-MCNC: 72 MG/DL (ref ?–100)
MCH RBC QN AUTO: 33.6 PG (ref 26–34)
MCHC RBC AUTO-ENTMCNC: 34 G/DL (ref 31–37)
MCV RBC AUTO: 98.6 FL
NONHDLC SERPL-MCNC: 92 MG/DL (ref ?–130)
OSMOLALITY SERPL CALC.SUM OF ELEC: 291 MOSM/KG (ref 275–295)
PLATELET # BLD AUTO: 245 10(3)UL (ref 150–450)
POTASSIUM SERPL-SCNC: 4.7 MMOL/L (ref 3.5–5.1)
PROT SERPL-MCNC: 7.8 G/DL (ref 6.4–8.2)
RBC # BLD AUTO: 4.44 X10(6)UL
SODIUM SERPL-SCNC: 139 MMOL/L (ref 136–145)
TRIGL SERPL-MCNC: 111 MG/DL (ref 30–149)
TSI SER-ACNC: 0.88 MIU/ML (ref 0.36–3.74)
VLDLC SERPL CALC-MCNC: 17 MG/DL (ref 0–30)
WBC # BLD AUTO: 5 X10(3) UL (ref 4–11)

## 2022-06-23 PROCEDURE — 81015 MICROSCOPIC EXAM OF URINE: CPT

## 2022-06-23 PROCEDURE — 80053 COMPREHEN METABOLIC PANEL: CPT

## 2022-06-23 PROCEDURE — 36415 COLL VENOUS BLD VENIPUNCTURE: CPT

## 2022-06-23 PROCEDURE — 85027 COMPLETE CBC AUTOMATED: CPT

## 2022-06-23 PROCEDURE — 84443 ASSAY THYROID STIM HORMONE: CPT

## 2022-06-23 PROCEDURE — 83036 HEMOGLOBIN GLYCOSYLATED A1C: CPT

## 2022-06-23 PROCEDURE — 80061 LIPID PANEL: CPT

## 2022-06-30 ENCOUNTER — NURSE ONLY (OUTPATIENT)
Dept: ALLERGY | Facility: CLINIC | Age: 47
End: 2022-06-30
Payer: COMMERCIAL

## 2022-06-30 DIAGNOSIS — J30.89 ENVIRONMENTAL AND SEASONAL ALLERGIES: ICD-10-CM

## 2022-06-30 PROCEDURE — 95117 IMMUNOTHERAPY INJECTIONS: CPT | Performed by: ALLERGY & IMMUNOLOGY

## 2022-07-07 ENCOUNTER — PATIENT MESSAGE (OUTPATIENT)
Dept: INTERNAL MEDICINE CLINIC | Facility: CLINIC | Age: 47
End: 2022-07-07

## 2022-07-08 NOTE — TELEPHONE ENCOUNTER
Called patient left a detailed message notifying him the Health screening form was faxed on 6/27 and re-fax today.

## 2022-07-28 ENCOUNTER — NURSE ONLY (OUTPATIENT)
Dept: ALLERGY | Facility: CLINIC | Age: 47
End: 2022-07-28
Payer: COMMERCIAL

## 2022-07-28 DIAGNOSIS — J30.89 ENVIRONMENTAL AND SEASONAL ALLERGIES: ICD-10-CM

## 2022-07-28 PROCEDURE — 95117 IMMUNOTHERAPY INJECTIONS: CPT | Performed by: INTERNAL MEDICINE

## 2022-08-25 ENCOUNTER — NURSE ONLY (OUTPATIENT)
Dept: ALLERGY | Facility: CLINIC | Age: 47
End: 2022-08-25
Payer: COMMERCIAL

## 2022-08-25 DIAGNOSIS — J30.89 ENVIRONMENTAL AND SEASONAL ALLERGIES: ICD-10-CM

## 2022-08-25 PROCEDURE — 95117 IMMUNOTHERAPY INJECTIONS: CPT | Performed by: ALLERGY & IMMUNOLOGY

## 2022-08-29 ENCOUNTER — APPOINTMENT (OUTPATIENT)
Dept: GENERAL RADIOLOGY | Age: 47
End: 2022-08-29
Attending: NURSE PRACTITIONER
Payer: COMMERCIAL

## 2022-08-29 ENCOUNTER — HOSPITAL ENCOUNTER (OUTPATIENT)
Age: 47
Discharge: HOME | End: 2022-08-29
Payer: COMMERCIAL

## 2022-08-29 VITALS
HEIGHT: 72 IN | RESPIRATION RATE: 18 BRPM | DIASTOLIC BLOOD PRESSURE: 83 MMHG | TEMPERATURE: 98 F | WEIGHT: 242 LBS | SYSTOLIC BLOOD PRESSURE: 157 MMHG | BODY MASS INDEX: 32.78 KG/M2 | OXYGEN SATURATION: 100 % | HEART RATE: 67 BPM

## 2022-08-29 DIAGNOSIS — S90.32XA CONTUSION OF LEFT FOOT, INITIAL ENCOUNTER: Primary | ICD-10-CM

## 2022-08-29 PROCEDURE — 73630 X-RAY EXAM OF FOOT: CPT | Performed by: NURSE PRACTITIONER

## 2022-08-29 PROCEDURE — 99213 OFFICE O/P EST LOW 20 MIN: CPT | Performed by: NURSE PRACTITIONER

## 2022-08-29 PROCEDURE — L3260 AMBULATORY SURGICAL BOOT EAC: HCPCS | Performed by: NURSE PRACTITIONER

## 2022-09-20 ENCOUNTER — NURSE ONLY (OUTPATIENT)
Dept: ALLERGY | Facility: CLINIC | Age: 47
End: 2022-09-20

## 2022-09-20 DIAGNOSIS — J30.89 ENVIRONMENTAL AND SEASONAL ALLERGIES: ICD-10-CM

## 2022-09-20 PROCEDURE — 95117 IMMUNOTHERAPY INJECTIONS: CPT | Performed by: ALLERGY & IMMUNOLOGY

## 2022-09-20 PROCEDURE — 95165 ANTIGEN THERAPY SERVICES: CPT | Performed by: ALLERGY & IMMUNOLOGY

## 2022-10-12 ENCOUNTER — OFFICE VISIT (OUTPATIENT)
Dept: PODIATRY CLINIC | Facility: CLINIC | Age: 47
End: 2022-10-12
Payer: COMMERCIAL

## 2022-10-12 ENCOUNTER — HOSPITAL ENCOUNTER (OUTPATIENT)
Dept: GENERAL RADIOLOGY | Facility: HOSPITAL | Age: 47
Discharge: HOME | End: 2022-10-12
Attending: PODIATRIST
Payer: COMMERCIAL

## 2022-10-12 DIAGNOSIS — M79.672 LEFT FOOT PAIN: Primary | ICD-10-CM

## 2022-10-12 DIAGNOSIS — M79.672 LEFT FOOT PAIN: ICD-10-CM

## 2022-10-12 DIAGNOSIS — S90.32XA CONTUSION OF LEFT FOOT, INITIAL ENCOUNTER: ICD-10-CM

## 2022-10-12 PROCEDURE — 73630 X-RAY EXAM OF FOOT: CPT | Performed by: PODIATRIST

## 2022-10-12 PROCEDURE — 99203 OFFICE O/P NEW LOW 30 MIN: CPT | Performed by: PODIATRIST

## 2022-10-19 ENCOUNTER — NURSE ONLY (OUTPATIENT)
Dept: ALLERGY | Facility: CLINIC | Age: 47
End: 2022-10-19
Payer: COMMERCIAL

## 2022-10-19 DIAGNOSIS — J30.89 ENVIRONMENTAL AND SEASONAL ALLERGIES: ICD-10-CM

## 2022-10-19 PROCEDURE — 95117 IMMUNOTHERAPY INJECTIONS: CPT | Performed by: ALLERGY & IMMUNOLOGY

## 2022-10-24 ENCOUNTER — IMMUNIZATION (OUTPATIENT)
Dept: LAB | Facility: HOSPITAL | Age: 47
End: 2022-10-24
Attending: PREVENTIVE MEDICINE
Payer: COMMERCIAL

## 2022-10-24 DIAGNOSIS — Z23 NEED FOR VACCINATION: Primary | ICD-10-CM

## 2022-10-24 PROCEDURE — 90471 IMMUNIZATION ADMIN: CPT

## 2022-11-17 ENCOUNTER — NURSE ONLY (OUTPATIENT)
Dept: ALLERGY | Facility: CLINIC | Age: 47
End: 2022-11-17
Payer: COMMERCIAL

## 2022-11-17 DIAGNOSIS — J30.89 ENVIRONMENTAL AND SEASONAL ALLERGIES: ICD-10-CM

## 2022-11-17 PROCEDURE — 95117 IMMUNOTHERAPY INJECTIONS: CPT | Performed by: ALLERGY & IMMUNOLOGY

## 2022-12-15 ENCOUNTER — NURSE ONLY (OUTPATIENT)
Dept: ALLERGY | Facility: CLINIC | Age: 47
End: 2022-12-15
Payer: COMMERCIAL

## 2022-12-15 DIAGNOSIS — J30.89 ENVIRONMENTAL AND SEASONAL ALLERGIES: ICD-10-CM

## 2022-12-15 PROCEDURE — 95117 IMMUNOTHERAPY INJECTIONS: CPT | Performed by: ALLERGY & IMMUNOLOGY

## 2023-01-06 ENCOUNTER — TELEPHONE (OUTPATIENT)
Dept: ORTHOPEDICS CLINIC | Facility: CLINIC | Age: 48
End: 2023-01-06

## 2023-01-06 ENCOUNTER — PATIENT MESSAGE (OUTPATIENT)
Dept: ORTHOPEDICS CLINIC | Facility: CLINIC | Age: 48
End: 2023-01-06

## 2023-01-06 NOTE — TELEPHONE ENCOUNTER
From: Wale Ferguson  To: Gino Pond. Toi Hernandes DPM  Sent: 1/6/2023 12:08 PM CST  Subject: Order for Xrays for appt on 1/13    I did get the message about Xrays but wanted to check as I did get 2 sets already 1 back in August and another in October related to this condition. I am fine coming in early to get some more but thought I would double check as they were within the [facility] system.     Thanks

## 2023-01-12 ENCOUNTER — NURSE ONLY (OUTPATIENT)
Dept: ALLERGY | Facility: CLINIC | Age: 48
End: 2023-01-12

## 2023-01-12 DIAGNOSIS — J30.1 ALLERGIC RHINITIS DUE TO POLLEN, UNSPECIFIED SEASONALITY: ICD-10-CM

## 2023-01-12 PROCEDURE — 95117 IMMUNOTHERAPY INJECTIONS: CPT | Performed by: ALLERGY & IMMUNOLOGY

## 2023-01-13 ENCOUNTER — OFFICE VISIT (OUTPATIENT)
Dept: ORTHOPEDICS CLINIC | Facility: CLINIC | Age: 48
End: 2023-01-13
Payer: COMMERCIAL

## 2023-01-13 DIAGNOSIS — M19.079 ARTHRITIS, MIDFOOT: ICD-10-CM

## 2023-01-13 DIAGNOSIS — M25.373 ANKLE INSTABILITY, UNSPECIFIED LATERALITY: ICD-10-CM

## 2023-01-13 DIAGNOSIS — M67.479 GANGLION CYST OF FOOT: Primary | ICD-10-CM

## 2023-01-13 DIAGNOSIS — M79.2 NEURITIS: ICD-10-CM

## 2023-01-13 PROCEDURE — 20551 NJX 1 TENDON ORIGIN/INSJ: CPT | Performed by: PODIATRIST

## 2023-01-13 PROCEDURE — 99203 OFFICE O/P NEW LOW 30 MIN: CPT | Performed by: PODIATRIST

## 2023-01-13 RX ORDER — BETAMETHASONE SODIUM PHOSPHATE AND BETAMETHASONE ACETATE 3; 3 MG/ML; MG/ML
12 INJECTION, SUSPENSION INTRA-ARTICULAR; INTRALESIONAL; INTRAMUSCULAR; SOFT TISSUE ONCE
Status: COMPLETED | OUTPATIENT
Start: 2023-01-13 | End: 2023-01-13

## 2023-01-13 RX ADMIN — BETAMETHASONE SODIUM PHOSPHATE AND BETAMETHASONE ACETATE 12 MG: 3; 3 INJECTION, SUSPENSION INTRA-ARTICULAR; INTRALESIONAL; INTRAMUSCULAR; SOFT TISSUE at 12:59:00

## 2023-02-09 ENCOUNTER — OFFICE VISIT (OUTPATIENT)
Dept: ALLERGY | Facility: CLINIC | Age: 48
End: 2023-02-09

## 2023-02-09 ENCOUNTER — NURSE ONLY (OUTPATIENT)
Dept: ALLERGY | Facility: CLINIC | Age: 48
End: 2023-02-09

## 2023-02-09 VITALS — DIASTOLIC BLOOD PRESSURE: 82 MMHG | HEART RATE: 84 BPM | SYSTOLIC BLOOD PRESSURE: 133 MMHG

## 2023-02-09 DIAGNOSIS — Z23 FLU VACCINE NEED: ICD-10-CM

## 2023-02-09 DIAGNOSIS — J30.1 SEASONAL ALLERGIC RHINITIS DUE TO POLLEN: ICD-10-CM

## 2023-02-09 DIAGNOSIS — J30.1 ALLERGIC RHINITIS DUE TO POLLEN, UNSPECIFIED SEASONALITY: Primary | ICD-10-CM

## 2023-02-09 DIAGNOSIS — Z23 COVID-19 VACCINE SERIES STARTED: ICD-10-CM

## 2023-02-09 DIAGNOSIS — J30.89 ENVIRONMENTAL AND SEASONAL ALLERGIES: ICD-10-CM

## 2023-02-09 PROCEDURE — 95165 ANTIGEN THERAPY SERVICES: CPT | Performed by: ALLERGY & IMMUNOLOGY

## 2023-02-09 PROCEDURE — 3079F DIAST BP 80-89 MM HG: CPT | Performed by: ALLERGY & IMMUNOLOGY

## 2023-02-09 PROCEDURE — 99213 OFFICE O/P EST LOW 20 MIN: CPT | Performed by: ALLERGY & IMMUNOLOGY

## 2023-02-09 PROCEDURE — 95117 IMMUNOTHERAPY INJECTIONS: CPT | Performed by: ALLERGY & IMMUNOLOGY

## 2023-02-09 PROCEDURE — 3075F SYST BP GE 130 - 139MM HG: CPT | Performed by: ALLERGY & IMMUNOLOGY

## 2023-03-09 ENCOUNTER — NURSE ONLY (OUTPATIENT)
Dept: ALLERGY | Facility: CLINIC | Age: 48
End: 2023-03-09

## 2023-03-09 DIAGNOSIS — J30.89 ENVIRONMENTAL AND SEASONAL ALLERGIES: ICD-10-CM

## 2023-03-09 PROCEDURE — 95117 IMMUNOTHERAPY INJECTIONS: CPT | Performed by: ALLERGY & IMMUNOLOGY

## 2023-04-06 ENCOUNTER — NURSE ONLY (OUTPATIENT)
Dept: ALLERGY | Facility: CLINIC | Age: 48
End: 2023-04-06

## 2023-04-06 DIAGNOSIS — J30.89 ENVIRONMENTAL AND SEASONAL ALLERGIES: ICD-10-CM

## 2023-04-06 PROCEDURE — 95117 IMMUNOTHERAPY INJECTIONS: CPT | Performed by: ALLERGY & IMMUNOLOGY

## 2023-05-04 ENCOUNTER — NURSE ONLY (OUTPATIENT)
Dept: ALLERGY | Facility: CLINIC | Age: 48
End: 2023-05-04

## 2023-05-04 DIAGNOSIS — J30.89 ENVIRONMENTAL AND SEASONAL ALLERGIES: ICD-10-CM

## 2023-05-04 PROCEDURE — 95117 IMMUNOTHERAPY INJECTIONS: CPT | Performed by: ALLERGY & IMMUNOLOGY

## 2023-06-01 ENCOUNTER — NURSE ONLY (OUTPATIENT)
Dept: ALLERGY | Facility: CLINIC | Age: 48
End: 2023-06-01

## 2023-06-01 DIAGNOSIS — J30.89 ENVIRONMENTAL AND SEASONAL ALLERGIES: ICD-10-CM

## 2023-06-01 PROCEDURE — 95117 IMMUNOTHERAPY INJECTIONS: CPT | Performed by: ALLERGY & IMMUNOLOGY

## 2023-06-22 NOTE — PROGRESS NOTES
Diagnosis:  Superior glenoid labrum lesion of left shoulder, initial encounter (C89.912O); Complete tear of left rotator cuff (J15.719)   Surgery: 11/27/18      Authorized # of Visits: (per eval 12 wks)    Insurance: Miguelangel Montoya EPO/PPO            Next MD visit flexion/extension/abduction/ER/IR 10 reps x 10 cts ea NuStep UE only L5 x 8 mins    Supine: (L) UE PROM all directions with oscillations    Supine: (L) UE flexion to 90 degs 2 lbs x 20 reps    Supine: (L) UE punches @ 90 degs 2 lbs x 20 reps    Supine: (L) 20 reps    Prone: (L) UE h. Abduction 3 lbs 2 x 20 reps    Prone: (L) UE rhomboid 1 lb 2 x 20 reps    Prone: (L) UE scaption 2 lbs 2 x 20 reps    Seated: (L) UE ER @ 90/90 with redTB 2 x 20 reps    (L) UE scaption rhytmic stab with 1 kg ball 5 sets x 10 re ea    (R/L) UE D1D2 Flex/Ext blueTB 2 x 20 reps ea    Prone: (L) UE plyocatch 1 kg ball in scaption release/catch 5 sets x 20 reps    (L) UE OH 4 lb ball bounce 5 reps x 20 secs ea Scifit UE only L9 4 mins fwd/bwkd    (B) UE blueTB n.row, extension, w.row x 10 cts ea    Standing: (B) UE rhytmic-hold ER @ 90/90 blueTB 3 sets x 10 reps ea; clicking (L) shoulder, no pain    Prone: (B) UE rhytmic hold rhomboids, scaption, ER @ 90/90 2 lbs 10 sets x 10 reps ea    Catch/throw 4 lb ball onto rebounder x 20 reps; N varied command following/generally intact

## 2023-06-26 RX ORDER — LISINOPRIL AND HYDROCHLOROTHIAZIDE 12.5; 1 MG/1; MG/1
1 TABLET ORAL DAILY
Qty: 90 TABLET | Refills: 3 | Status: SHIPPED | OUTPATIENT
Start: 2023-06-26

## 2023-06-26 RX ORDER — ATORVASTATIN CALCIUM 10 MG/1
10 TABLET, FILM COATED ORAL NIGHTLY
Qty: 90 TABLET | Refills: 3 | Status: SHIPPED | OUTPATIENT
Start: 2023-06-26

## 2023-06-26 NOTE — TELEPHONE ENCOUNTER
Please review. Protocol failed. Future Appointments   Date Time Provider Nubia Arina   8/4/2023 11:15 AM Bonny Burroughs MD [facility]     No Active/ Future labs currently pended    Requested Prescriptions   Pending Prescriptions Disp Refills    LISINOPRIL-HYDROCHLOROTHIAZIDE 10-12.5 MG Oral Tab [Pharmacy Med Name: LISINOPRIL-HCTZ 10-12.5 MG TAB] 90 tablet 3     Sig: TAKE 1 TABLET BY MOUTH EVERY DAY       Hypertensive Medications Protocol Failed - 6/25/2023  7:35 AM        Failed - CMP or BMP in past 6 months     No results found for this or any previous visit (from the past 4392 hour(s)).             Failed - In person appointment or virtual visit in the past 6 months     Recent Outpatient Visits              3 weeks ago Environmental and seasonal allergies    [facility], [address] [place] [address] [place]    Nurse Only    1 month ago Environmental and seasonal allergies    [facility], [address] [place] [address] [place]    Nurse Only    2 months ago Environmental and seasonal allergies    [facility], [address] [place] [address] [place]    Nurse Only    3 months ago Environmental and seasonal allergies    [facility], [address] [place] [address] [place]    Nurse Only    4 months ago Environmental and seasonal allergies    [address] [facility],[address], [address] [facility] [place]    Nurse Only          Future Appointments         Provider Department Appt Notes    In 3 days [address]za     In [facility] month [facility]     In 1 month Bonny Burroughs  [facility],[address], [address] [facility] annual check up last px 6/22/2022    In 1 month [facility]     In 2 months [address]     In 3 months [address] [place]                Failed - EGFRCR or GFRNAA > 50     GFR Evaluation            Passed - In person appointment in the past 12 or next 3 months     Recent Outpatient Visits              3 weeks ago Environmental and seasonal allergies    [facility], [address] [facility] [place]    Nurse Only    1 month ago Environmental and seasonal allergies    [facility], [address] [place] [address] [place]    Nurse Only    2 months ago Environmental and seasonal allergies    [facility], [address] [facility] Erin [place]    Nurse Only    3 months ago Environmental and seasonal allergies    [facility], [address] [facility] Erin [place]    Nurse Only    4 months ago Environmental and seasonal allergies    [address], [address] [facility] Wesley Khan [facility] 14 Only          Future Appointments         Provider Department Appt Notes    In 3 days [address] [facility], Marc     In 1 month [facility]     In 1 month Fernando Gonzales MD [address], [address] [facility] annual check up last px 6/22/2022    In 1 month [address] [facility], Marc     In 2 months [facility]     In 3 months [facility], [address] [place] [address]                Passed - Last BP reading less than 140/90     BP Readings from Last 1 Encounters:  02/09/23 : 133/82                ATORVASTATIN 10 MG Oral Tab [Pharmacy Med Name: ATORVASTATIN 10 MG TABLET] 90 tablet 3     Sig: TAKE 1 TABLET BY MOUTH EVERY DAY AT NIGHT       Cholesterol Medication Protocol Failed - 6/25/2023  7:35 AM        Failed - ALT in past 12 months        Failed - LDL in past 12 months        Failed - Last ALT < 80     Lab Results   Component Value Date    ALT 46 06/23/2022             Failed - Last LDL < 130     Lab Results   Component Value Date    LDL 72 06/23/2022             Passed - In person appointment or virtual visit in the past 12 mos or appointment in next 3 mos     Recent Outpatient Visits              3 weeks ago Environmental and seasonal allergies    [facility] [address] [facility] [place]    Nurse Only    1 month ago Environmental and seasonal allergies    [facility], [address] [facility] [place]    Nurse Only    2 months ago Environmental and seasonal allergies    [facility], [address] [facility] [place]    Nurse Only    3 months ago Environmental and seasonal allergies    Kiko Vargas, [facility] [place]    Nurse Only    4 months ago Environmental and seasonal allergies    [address]as, [place] [facility] [place]    Nurse Only          Future Appointments         Provider Department Appt Notes    In 3 days [facility]     In 1 month [facility]     In 1 month MD Kiko Guillen, [address] [facility] annual check up last px 6/22/2022    In 1 month [address]ouza     In 2 months [address] [facility], Marc     In 3 months [facility]                   Future Appointments         Provider Department Appt Notes    In 3 days [address]ouza     In 1 month [address] [facility]     In 1 month MD Kiko Guillen, [address] [facility] annual check up last px 6/22/2022    In 1 month [address] [facility] In 2 months [facility], Marc     In 3 months [facility]            Recent Outpatient Visits              3 weeks ago [facility] and seasonal allergies    [facility] , [place]    Nurse Only    1 month ago Environmental and seasonal allergies    [facility] [place]    Nurse Only    2 months ago Environmental and seasonal allergies    [facility] , [place]    Nurse Only    3 months ago Environmental and seasonal allergies    [address] [place]    Nurse Only    4 months ago Environmental and seasonal allergies    [address] Only

## 2023-06-29 ENCOUNTER — NURSE ONLY (OUTPATIENT)
Dept: ALLERGY | Facility: CLINIC | Age: 48
End: 2023-06-29

## 2023-06-29 DIAGNOSIS — J30.89 ENVIRONMENTAL AND SEASONAL ALLERGIES: Primary | ICD-10-CM

## 2023-06-29 PROCEDURE — 95117 IMMUNOTHERAPY INJECTIONS: CPT | Performed by: ALLERGY & IMMUNOLOGY

## 2023-06-29 PROCEDURE — 95165 ANTIGEN THERAPY SERVICES: CPT | Performed by: ALLERGY & IMMUNOLOGY

## 2023-07-27 ENCOUNTER — NURSE ONLY (OUTPATIENT)
Dept: ALLERGY | Facility: CLINIC | Age: 48
End: 2023-07-27

## 2023-07-27 DIAGNOSIS — J30.89 ENVIRONMENTAL AND SEASONAL ALLERGIES: Primary | ICD-10-CM

## 2023-07-27 PROCEDURE — 95117 IMMUNOTHERAPY INJECTIONS: CPT | Performed by: ALLERGY & IMMUNOLOGY

## 2023-08-31 ENCOUNTER — NURSE ONLY (OUTPATIENT)
Dept: ALLERGY | Facility: CLINIC | Age: 48
End: 2023-08-31

## 2023-08-31 DIAGNOSIS — J30.89 ENVIRONMENTAL AND SEASONAL ALLERGIES: Primary | ICD-10-CM

## 2023-08-31 PROCEDURE — 95117 IMMUNOTHERAPY INJECTIONS: CPT | Performed by: ALLERGY & IMMUNOLOGY

## 2023-09-14 ENCOUNTER — OFFICE VISIT (OUTPATIENT)
Facility: CLINIC | Age: 48
End: 2023-09-14

## 2023-09-14 ENCOUNTER — PATIENT MESSAGE (OUTPATIENT)
Facility: CLINIC | Age: 48
End: 2023-09-14

## 2023-09-14 VITALS
HEIGHT: 72 IN | SYSTOLIC BLOOD PRESSURE: 124 MMHG | RESPIRATION RATE: 14 BRPM | OXYGEN SATURATION: 96 % | BODY MASS INDEX: 32.51 KG/M2 | DIASTOLIC BLOOD PRESSURE: 82 MMHG | HEART RATE: 68 BPM | WEIGHT: 240 LBS

## 2023-09-14 DIAGNOSIS — Z00.00 ADULT GENERAL MEDICAL EXAM: Primary | ICD-10-CM

## 2023-09-14 PROCEDURE — 3079F DIAST BP 80-89 MM HG: CPT | Performed by: INTERNAL MEDICINE

## 2023-09-14 PROCEDURE — 99396 PREV VISIT EST AGE 40-64: CPT | Performed by: INTERNAL MEDICINE

## 2023-09-14 PROCEDURE — 3008F BODY MASS INDEX DOCD: CPT | Performed by: INTERNAL MEDICINE

## 2023-09-14 PROCEDURE — 3074F SYST BP LT 130 MM HG: CPT | Performed by: INTERNAL MEDICINE

## 2023-09-19 ENCOUNTER — LAB ENCOUNTER (OUTPATIENT)
Dept: LAB | Facility: HOSPITAL | Age: 48
End: 2023-09-19
Attending: INTERNAL MEDICINE
Payer: COMMERCIAL

## 2023-09-19 DIAGNOSIS — Z00.00 ADULT GENERAL MEDICAL EXAM: ICD-10-CM

## 2023-09-19 LAB
ALBUMIN SERPL-MCNC: 4.1 G/DL (ref 3.4–5)
ALBUMIN/GLOB SERPL: 1.2 {RATIO} (ref 1–2)
ALP LIVER SERPL-CCNC: 56 U/L
ALT SERPL-CCNC: 41 U/L
ANION GAP SERPL CALC-SCNC: 7 MMOL/L (ref 0–18)
AST SERPL-CCNC: 26 U/L (ref 15–37)
BILIRUB SERPL-MCNC: 0.5 MG/DL (ref 0.1–2)
BUN BLD-MCNC: 22 MG/DL (ref 7–18)
BUN/CREAT SERPL: 19 (ref 10–20)
CALCIUM BLD-MCNC: 9.9 MG/DL (ref 8.5–10.1)
CHLORIDE SERPL-SCNC: 107 MMOL/L (ref 98–112)
CHOLEST SERPL-MCNC: 146 MG/DL (ref ?–200)
CO2 SERPL-SCNC: 28 MMOL/L (ref 21–32)
COMPLEXED PSA SERPL-MCNC: 0.99 NG/ML (ref ?–4)
CREAT BLD-MCNC: 1.16 MG/DL
DEPRECATED RDW RBC AUTO: 40 FL (ref 35.1–46.3)
EGFRCR SERPLBLD CKD-EPI 2021: 77 ML/MIN/1.73M2 (ref 60–?)
ERYTHROCYTE [DISTWIDTH] IN BLOOD BY AUTOMATED COUNT: 11.6 % (ref 11–15)
EST. AVERAGE GLUCOSE BLD GHB EST-MCNC: 120 MG/DL (ref 68–126)
FASTING PATIENT LIPID ANSWER: YES
FASTING STATUS PATIENT QL REPORTED: YES
GLOBULIN PLAS-MCNC: 3.4 G/DL (ref 2.8–4.4)
GLUCOSE BLD-MCNC: 98 MG/DL (ref 70–99)
HBA1C MFR BLD: 5.8 % (ref ?–5.7)
HCT VFR BLD AUTO: 40.6 %
HDLC SERPL-MCNC: 49 MG/DL (ref 40–59)
HGB BLD-MCNC: 14.4 G/DL
LDLC SERPL CALC-MCNC: 68 MG/DL (ref ?–100)
MCH RBC QN AUTO: 33.9 PG (ref 26–34)
MCHC RBC AUTO-ENTMCNC: 35.5 G/DL (ref 31–37)
MCV RBC AUTO: 95.5 FL
NONHDLC SERPL-MCNC: 97 MG/DL (ref ?–130)
OSMOLALITY SERPL CALC.SUM OF ELEC: 297 MOSM/KG (ref 275–295)
PLATELET # BLD AUTO: 182 10(3)UL (ref 150–450)
POTASSIUM SERPL-SCNC: 4.5 MMOL/L (ref 3.5–5.1)
PROT SERPL-MCNC: 7.5 G/DL (ref 6.4–8.2)
RBC # BLD AUTO: 4.25 X10(6)UL
SODIUM SERPL-SCNC: 142 MMOL/L (ref 136–145)
TRIGL SERPL-MCNC: 175 MG/DL (ref 30–149)
TSI SER-ACNC: 1.26 MIU/ML (ref 0.36–3.74)
VLDLC SERPL CALC-MCNC: 26 MG/DL (ref 0–30)
WBC # BLD AUTO: 5.2 X10(3) UL (ref 4–11)

## 2023-09-19 PROCEDURE — 36415 COLL VENOUS BLD VENIPUNCTURE: CPT

## 2023-09-19 PROCEDURE — 84443 ASSAY THYROID STIM HORMONE: CPT

## 2023-09-19 PROCEDURE — 80061 LIPID PANEL: CPT

## 2023-09-19 PROCEDURE — 80053 COMPREHEN METABOLIC PANEL: CPT

## 2023-09-19 PROCEDURE — 83036 HEMOGLOBIN GLYCOSYLATED A1C: CPT

## 2023-09-19 PROCEDURE — 85027 COMPLETE CBC AUTOMATED: CPT

## 2023-09-28 ENCOUNTER — NURSE ONLY (OUTPATIENT)
Dept: ALLERGY | Facility: CLINIC | Age: 48
End: 2023-09-28

## 2023-09-28 DIAGNOSIS — J30.89 ENVIRONMENTAL AND SEASONAL ALLERGIES: Primary | ICD-10-CM

## 2023-09-28 PROCEDURE — 95117 IMMUNOTHERAPY INJECTIONS: CPT | Performed by: ALLERGY & IMMUNOLOGY

## 2023-10-26 ENCOUNTER — NURSE ONLY (OUTPATIENT)
Dept: ALLERGY | Facility: CLINIC | Age: 48
End: 2023-10-26

## 2023-10-26 DIAGNOSIS — J30.89 ENVIRONMENTAL AND SEASONAL ALLERGIES: Primary | ICD-10-CM

## 2023-10-26 PROCEDURE — 95165 ANTIGEN THERAPY SERVICES: CPT | Performed by: ALLERGY & IMMUNOLOGY

## 2023-10-26 PROCEDURE — 95117 IMMUNOTHERAPY INJECTIONS: CPT | Performed by: ALLERGY & IMMUNOLOGY

## 2023-11-14 ENCOUNTER — PATIENT MESSAGE (OUTPATIENT)
Dept: ALLERGY | Facility: CLINIC | Age: 48
End: 2023-11-14

## 2023-11-14 NOTE — TELEPHONE ENCOUNTER
From: [facility]  To: Humaira Dalton  Sent: 11/14/2023 8:40 AM CST  Subject: Shot Appointments    I do not seem to have any further appointments set up for my shots since the last time I was in on October 26th. Would you be able to set more up for me and prefer 8am if available for them otherwise as close to that time as you can get.     Thanks

## 2023-11-20 ENCOUNTER — NURSE ONLY (OUTPATIENT)
Dept: ALLERGY | Facility: CLINIC | Age: 48
End: 2023-11-20
Payer: COMMERCIAL

## 2023-11-20 DIAGNOSIS — J30.89 ENVIRONMENTAL AND SEASONAL ALLERGIES: Primary | ICD-10-CM

## 2023-12-21 ENCOUNTER — NURSE ONLY (OUTPATIENT)
Dept: ALLERGY | Facility: CLINIC | Age: 48
End: 2023-12-21
Payer: COMMERCIAL

## 2023-12-21 DIAGNOSIS — J30.89 ENVIRONMENTAL AND SEASONAL ALLERGIES: Primary | ICD-10-CM

## 2023-12-21 PROCEDURE — 95117 IMMUNOTHERAPY INJECTIONS: CPT | Performed by: ALLERGY & IMMUNOLOGY

## 2024-01-07 NOTE — TELEPHONE ENCOUNTER
Pt states he knocked down the bottle of Lisinopril in the sink this morning and only 3 pills saved. He is asking for refill.
Refill passed per [facility] protocol.     Hypertensive Medications  Protocol Criteria:  · Appointment scheduled in the past 6 months or in the next 3 months  · BMP or CMP in the past 12 months  · Creatinine result < 2  Recent Outpatient Visits
Home

## 2024-01-18 ENCOUNTER — NURSE ONLY (OUTPATIENT)
Dept: ALLERGY | Facility: CLINIC | Age: 49
End: 2024-01-18
Payer: COMMERCIAL

## 2024-01-18 DIAGNOSIS — J30.89 ENVIRONMENTAL AND SEASONAL ALLERGIES: Primary | ICD-10-CM

## 2024-01-18 PROCEDURE — 95117 IMMUNOTHERAPY INJECTIONS: CPT | Performed by: ALLERGY & IMMUNOLOGY

## 2024-02-15 ENCOUNTER — OFFICE VISIT (OUTPATIENT)
Dept: ALLERGY | Facility: CLINIC | Age: 49
End: 2024-02-15

## 2024-02-15 ENCOUNTER — NURSE ONLY (OUTPATIENT)
Dept: ALLERGY | Facility: CLINIC | Age: 49
End: 2024-02-15
Payer: COMMERCIAL

## 2024-02-15 VITALS — HEART RATE: 77 BPM | DIASTOLIC BLOOD PRESSURE: 83 MMHG | OXYGEN SATURATION: 100 % | SYSTOLIC BLOOD PRESSURE: 119 MMHG

## 2024-02-15 DIAGNOSIS — J30.2 SEASONAL AND PERENNIAL ALLERGIC RHINOCONJUNCTIVITIS: Primary | ICD-10-CM

## 2024-02-15 DIAGNOSIS — J30.89 SEASONAL AND PERENNIAL ALLERGIC RHINOCONJUNCTIVITIS: Primary | ICD-10-CM

## 2024-02-15 DIAGNOSIS — J30.89 ENVIRONMENTAL AND SEASONAL ALLERGIES: Primary | ICD-10-CM

## 2024-02-15 DIAGNOSIS — Z23 FLU VACCINE NEED: ICD-10-CM

## 2024-02-15 DIAGNOSIS — Z23 COVID-19 VACCINE SERIES STARTED: ICD-10-CM

## 2024-02-15 DIAGNOSIS — H10.10 SEASONAL AND PERENNIAL ALLERGIC RHINOCONJUNCTIVITIS: Primary | ICD-10-CM

## 2024-02-15 PROCEDURE — 95117 IMMUNOTHERAPY INJECTIONS: CPT | Performed by: ALLERGY & IMMUNOLOGY

## 2024-02-15 PROCEDURE — 99214 OFFICE O/P EST MOD 30 MIN: CPT | Performed by: ALLERGY & IMMUNOLOGY

## 2024-02-15 NOTE — PROGRESS NOTES
Sonny Baker is a 48 year old male.    HPI:     Chief Complaint   Patient presents with    Allergies     Annual visit. No major concerns or symptoms.      Patient is a 48-year-old male who presents for follow-up with a chief complaint of allergies  Patient last seen by me in February 2023  Patient has a history of allergic rhinitis.  Patient currently on maintenance dose immunotherapy every 4 weeks to trees grass ragweed weeds and mold      No pets at home  COVID-vaccine x 2 doses.  No flu vaccine on record    Today patient reports    Ar:  Active or persistent symptoms:  denies   Active meds: zyrtec prn   pets : none   Denies adverse events with immunotherapy.  Overall immunotherapy is helping decrease symptoms as well as need for medications.  Denies recurrent sinus infections     Ait today   No issues with ait     Flu utd     MD 2/2019         HISTORY:  Past Medical History:   Diagnosis Date    Biceps muscle tear 5/1/2012    Blood in stool 5/18/2021    Essential hypertension 12/9/2013    Hemorrhoids May 2021    High blood pressure     High cholesterol     Physical exam, annual 11/5/2015      Past Surgical History:   Procedure Laterality Date    ARTHROSCOPY BICEPS TENODESIS Left 2012    COLONOSCOPY N/A 3/4/2022    Procedure: COLONOSCOPY;  Surgeon: Juan Ramon Hugo MD;  [facility]: [facility] ENDO    SHOULDER SURG PROC UNLISTED Right 12/1/2009    SHOULDER SURG PROC UNLISTED      left Shoulder       Family History   Problem Relation Age of Onset    Hypertension Father     Skin cancer Father         skin    Other (Other) Father     Cancer Father         skin cancer on ear    Diabetes Neg     Glaucoma Neg       Social History:   Social History     Socioeconomic History    Marital status:    Tobacco Use    Smoking status: Never    Smokeless tobacco: Never   Vaping Use    Vaping Use: Never used   Substance and Sexual Activity    Alcohol use: Yes     Alcohol/week: 0.0 standard drinks of alcohol     Comment:  Socially    Drug use: No    Sexual activity: Yes        Medications (Active prior to today's visit):  Current Outpatient Medications   Medication Sig Dispense Refill    lisinopril-hydroCHLOROthiazide 10-12.5 MG Oral Tab Take 1 tablet by mouth daily. 90 tablet 3    atorvastatin 10 MG Oral Tab Take 1 tablet (10 mg total) by mouth nightly. 90 tablet 3    cholecalciferol 50 MCG (2000 UT) Oral Cap Take 1 capsule (2,000 Units total) by mouth daily.         Allergies:  Allergies   Allergen Reactions    Seasonal Runny nose         ROS:   Allergic/Immuno:  See hpi  Cardiovascular:  Negative for irregular heartbeat/palpitations, chest pain, edema  Constitutional:  Negative night sweats,weight loss, irritability and lethargy  ENMT:  Negative for ear drainage, hearing loss  see hpi   Eyes:  Negative for eye discharge and vision loss  Gastrointestinal:  Negative for abdominal pain, diarrhea and vomiting  Integumentary:  Negative for pruritus and rash  Respiratory:  Negative for cough, dyspnea and wheezing    PHYSICAL EXAM:   Constitutional: responsive, no acute distress noted  Head/Face: NC/Atraumatic  Eyes/Vision: conjunctiva and lids are normal extraocular motion is intact   Ears/Audiometry: tympanic membranes are normal bilaterally hearing is grossly intact  Nose/Mouth/Throat: nose and throat are clear mucous membranes are moist   Neck/Thyroid: neck is supple without adenopathy  Lymphatic: no abnormal cervical, supraclavicular or axillary adenopathy is noted  Respiratory: normal to inspection lungs are clear to auscultation bilaterally normal respiratory effort   Cardiovascular: regular rate and rhythm no murmurs, gallups, or rubs  Abdomen: soft non-tender non-distended  Skin/Hair: no unusual rashes present   Extremities: no edema, cyanosis, or clubbing     ASSESSMENT/PLAN:   Assessment   Encounter Diagnoses   Name Primary?    Seasonal and perennial allergic rhinoconjunctivitis Yes    Flu vaccine need     COVID-19 vaccine  series started        #1 allergic rhinitis  Patient has reached 5 years of maintenance dosing as of this month February 2024.  Patient is maintenance dosing in February 2019  Currently using antihistamines on an as-needed basis and a day of shots.  Overall immunotherapy has been of great help to diminish symptoms and the need for medications.  Reviewed option of potentially stopping immunotherapy at this time.  Patient to consider.  Reviewed other potential option would be to retest   Patient to be posted along the way.  Reviewed avoidance measures  Zyrtec 10 mg once a day as needed  May add Flonase 2 sprays per nostril once a day if having prominent nasal congestion or postnasal drip    #2 flu vaccine up-to-date    #3 COVID vaccines reviewed.  Recommend booster.  Last dose in 2021.  Reviewed I do not have the booster my office.    Patient received immunotherapy followed by 30 minutes of observation without issue or incident    I spent 30 minutes on the day of the encounter related to this visit, not including separately reported activities or procedures.  This may have included non face-to-face time activities such as same day chart review in preparing to see the patient, orders, documentation in the electronic medical record, and/or communication with other healthcare professionals or family members/caregivers.             Orders This Visit:  No orders of the defined types were placed in this encounter.      Meds This Visit:  Requested Prescriptions      No prescriptions requested or ordered in this encounter       Imaging & Referrals:  None     2/15/2024  Porfirio Nunez MD    If medication samples were provided today, they were provided solely for patient education and training related to self administration of these medications.  Teaching, instruction and sample was provided to the patient by myself.  Teaching included  a review of potential adverse side effects as well as potential efficacy.  Patient's  questions were answered in regards to medication administration and dosing and potential side effects. Teaching was provided via the teach back method

## 2024-07-17 RX ORDER — LISINOPRIL AND HYDROCHLOROTHIAZIDE 12.5; 1 MG/1; MG/1
1 TABLET ORAL DAILY
Qty: 90 TABLET | Refills: 3 | Status: SHIPPED | OUTPATIENT
Start: 2024-07-17

## 2024-07-17 RX ORDER — ATORVASTATIN CALCIUM 10 MG/1
10 TABLET, FILM COATED ORAL NIGHTLY
Qty: 90 TABLET | Refills: 3 | Status: SHIPPED | OUTPATIENT
Start: 2024-07-17

## 2024-07-17 NOTE — TELEPHONE ENCOUNTER
REFILL PASSED PER [facility] PROTOCOLS    Requested Prescriptions   Pending Prescriptions Disp Refills    ATORVASTATIN 10 MG Oral Tab [Pharmacy Med Name: ATORVASTATIN 10 MG TABLET] 90 tablet 3     Sig: TAKE 1 TABLET BY MOUTH EVERY DAY AT NIGHT       Cholesterol Medication Protocol Passed - 7/13/2024  9:55 AM        Passed - ALT < 80     Lab Results   Component Value Date    ALT 41 09/19/2023             Passed - ALT resulted within past year        Passed - Lipid panel within past 12 months     Lab Results   Component Value Date    CHOLEST 146 09/19/2023    TRIG 175 (H) 09/19/2023    HDL 49 09/19/2023    LDL 68 09/19/2023    VLDL 26 09/19/2023    NONHDLC 97 09/19/2023             Passed - In person appointment or virtual visit in the past 12 mos or appointment in next 3 mos     Recent Outpatient Visits              5 months [facility]    [facility]    5 months ago Environmental and seasonal allergies    [facility]    Nurse Only    6 months ago Environmental and seasonal allergies [J30.89]    [facility]    Nurse Only    6 months ago Environmental and seasonal allergies    [facility]    Nurse Only    8 months ago Environmental and seasonal allergies    [facility]    Nurse Only                        LISINOPRIL-HYDROCHLOROTHIAZIDE 10-12.5 MG Oral Tab [Pharmacy Med Name: LISINOPRIL-HCTZ 10-12.5 MG TAB] 90 tablet 3     Sig: TAKE 1 TABLET BY MOUTH EVERY DAY       Hypertension Medications Protocol Passed - 7/13/2024  9:55 AM        Passed - CMP or BMP in past 12 months        Passed - Last BP reading less than 140/90     BP Readings from Last 1 Encounters:   02/15/24 119/83               Passed - In person  appointment or virtual visit in the past 12 mos or appointment in next 3 mos     Recent Outpatient Visits              5 months ago Seasonal and perennial allergic rhinoconjunctivitis    [facility]    [facility] Visit    5 months ago Environmental and seasonal allergies    [facility] [place]    Nurse Only    6 months ago Environmental and seasonal allergies [J30.89]    [facility] [place]    Nurse Only    6 months ago Environmental and seasonal allergies    [facility] [place]    Nurse Only    8 months ago Environmental and seasonal allergies    [facility] [place]    Nurse Only                      Passed - EGFRCR or GFRNAA > 50     GFR Evaluation  EGFRCR: 77 , resulted on 9/19/2023                 Recent Outpatient Visits              5 months ago Seasonal and perennial allergic rhinoconjunctivitis    [facility]    Office Visit    5 months ago Environmental and seasonal allergies    [facility] [place]    Nurse Only    6 months ago Environmental and seasonal allergies [J30.89]    [facility] [place]    Nurse Only    6 months ago Environmental and seasonal allergies    [facility] [place]    Nurse Only    8 months ago Environmental and seasonal allergies    [facility] [place]    Nurse Only

## 2024-09-18 ENCOUNTER — MED REC SCAN ONLY (OUTPATIENT)
Dept: ALLERGY | Facility: CLINIC | Age: 49
End: 2024-09-18

## 2024-09-20 ENCOUNTER — OFFICE VISIT (OUTPATIENT)
Facility: CLINIC | Age: 49
End: 2024-09-20
Payer: COMMERCIAL

## 2024-09-20 ENCOUNTER — LAB ENCOUNTER (OUTPATIENT)
Dept: LAB | Facility: HOSPITAL | Age: 49
End: 2024-09-20
Attending: INTERNAL MEDICINE
Payer: COMMERCIAL

## 2024-09-20 VITALS
HEART RATE: 66 BPM | OXYGEN SATURATION: 98 % | DIASTOLIC BLOOD PRESSURE: 78 MMHG | WEIGHT: 242 LBS | SYSTOLIC BLOOD PRESSURE: 130 MMHG | HEIGHT: 72 IN | BODY MASS INDEX: 32.78 KG/M2

## 2024-09-20 DIAGNOSIS — I10 ESSENTIAL HYPERTENSION: ICD-10-CM

## 2024-09-20 DIAGNOSIS — Z00.00 ADULT GENERAL MEDICAL EXAM: Primary | ICD-10-CM

## 2024-09-20 DIAGNOSIS — Z00.00 ADULT GENERAL MEDICAL EXAM: ICD-10-CM

## 2024-09-20 DIAGNOSIS — E78.5 DYSLIPIDEMIA: ICD-10-CM

## 2024-09-20 LAB
ALBUMIN SERPL-MCNC: 4.7 G/DL (ref 3.2–4.8)
ALBUMIN/GLOB SERPL: 1.7 {RATIO} (ref 1–2)
ALP LIVER SERPL-CCNC: 56 U/L
ALT SERPL-CCNC: 44 U/L
ANION GAP SERPL CALC-SCNC: 5 MMOL/L (ref 0–18)
AST SERPL-CCNC: 33 U/L (ref ?–34)
BILIRUB SERPL-MCNC: 0.6 MG/DL (ref 0.3–1.2)
BUN BLD-MCNC: 19 MG/DL (ref 9–23)
BUN/CREAT SERPL: 16.5 (ref 10–20)
CALCIUM BLD-MCNC: 9.9 MG/DL (ref 8.7–10.4)
CHLORIDE SERPL-SCNC: 106 MMOL/L (ref 98–112)
CHOLEST SERPL-MCNC: 147 MG/DL (ref ?–200)
CO2 SERPL-SCNC: 28 MMOL/L (ref 21–32)
COMPLEXED PSA SERPL-MCNC: 0.79 NG/ML (ref ?–4)
CREAT BLD-MCNC: 1.15 MG/DL
DEPRECATED RDW RBC AUTO: 42.9 FL (ref 35.1–46.3)
EGFRCR SERPLBLD CKD-EPI 2021: 78 ML/MIN/1.73M2 (ref 60–?)
ERYTHROCYTE [DISTWIDTH] IN BLOOD BY AUTOMATED COUNT: 11.8 % (ref 11–15)
EST. AVERAGE GLUCOSE BLD GHB EST-MCNC: 123 MG/DL (ref 68–126)
FASTING PATIENT LIPID ANSWER: YES
FASTING STATUS PATIENT QL REPORTED: YES
GLOBULIN PLAS-MCNC: 2.8 G/DL (ref 2–3.5)
GLUCOSE BLD-MCNC: 92 MG/DL (ref 70–99)
HBA1C MFR BLD: 5.9 % (ref ?–5.7)
HCT VFR BLD AUTO: 41 %
HDLC SERPL-MCNC: 55 MG/DL (ref 40–59)
HGB BLD-MCNC: 14.6 G/DL
LDLC SERPL CALC-MCNC: 77 MG/DL (ref ?–100)
MCH RBC QN AUTO: 35.2 PG (ref 26–34)
MCHC RBC AUTO-ENTMCNC: 35.6 G/DL (ref 31–37)
MCV RBC AUTO: 98.8 FL
NONHDLC SERPL-MCNC: 92 MG/DL (ref ?–130)
OSMOLALITY SERPL CALC.SUM OF ELEC: 290 MOSM/KG (ref 275–295)
PLATELET # BLD AUTO: 248 10(3)UL (ref 150–450)
POTASSIUM SERPL-SCNC: 4.9 MMOL/L (ref 3.5–5.1)
PROT SERPL-MCNC: 7.5 G/DL (ref 5.7–8.2)
RBC # BLD AUTO: 4.15 X10(6)UL
SODIUM SERPL-SCNC: 139 MMOL/L (ref 136–145)
TRIGL SERPL-MCNC: 76 MG/DL (ref 30–149)
TSI SER-ACNC: 1.35 MIU/ML (ref 0.55–4.78)
VLDLC SERPL CALC-MCNC: 12 MG/DL (ref 0–30)
WBC # BLD AUTO: 5.1 X10(3) UL (ref 4–11)

## 2024-09-20 PROCEDURE — 36415 COLL VENOUS BLD VENIPUNCTURE: CPT

## 2024-09-20 PROCEDURE — 85027 COMPLETE CBC AUTOMATED: CPT

## 2024-09-20 PROCEDURE — 83036 HEMOGLOBIN GLYCOSYLATED A1C: CPT

## 2024-09-20 PROCEDURE — 99396 PREV VISIT EST AGE 40-64: CPT | Performed by: INTERNAL MEDICINE

## 2024-09-20 PROCEDURE — 84443 ASSAY THYROID STIM HORMONE: CPT

## 2024-09-20 PROCEDURE — 80061 LIPID PANEL: CPT

## 2024-09-20 PROCEDURE — 80053 COMPREHEN METABOLIC PANEL: CPT

## 2024-09-20 NOTE — PROGRESS NOTES
Sonny Baker is a 49 year old male.  Chief Complaint   Patient presents with    Physical     HPI:   49-year-old gentleman  with PMH of HTN, HL here for physical.    Doing well.  He reports that occasionally he gets pain in the Achilles when he walks for long distance.     Past medical history-hypertension, dyslipidemia     Past surgical history shoulder surgery and bicep surgery     He is not allergic to any medication.     He does not smoke, denies alcohol or recreational drug abuse.     Family history-he denies any prostate cancer or colon cancer in the family.  He denies any premature heart disease or venous thromboembolic disease.     He is , he has kids.  And he is working for [employer].      Current Outpatient Medications   Medication Sig Dispense Refill    atorvastatin 10 MG Oral Tab Take 1 tablet (10 mg total) by mouth nightly. 90 tablet 3    lisinopril-hydroCHLOROthiazide 10-12.5 MG Oral Tab Take 1 tablet by mouth daily. 90 tablet 3    cholecalciferol 50 MCG (2000 UT) Oral Cap Take 1 capsule (2,000 Units total) by mouth daily.        Past Medical History:    Biceps muscle tear    Blood in stool    Essential hypertension    Hemorrhoids    High blood pressure    High cholesterol    Physical exam, annual      Past Surgical History:   Procedure Laterality Date    Arthroscopy biceps tenodesis Left 2012    Colonoscopy N/A 3/4/2022    Procedure: COLONOSCOPY;  Surgeon: Juan Ramon Hugo MD;  Location: [facility] ENDO    Shoulder surg proc unlisted Right 12/1/2009    Shoulder surg proc unlisted      left Shoulder       Social History:  Social History     Socioeconomic History    Marital status:    Tobacco Use    Smoking status: Never    Smokeless tobacco: Never   Vaping Use    Vaping status: Never Used   Substance and Sexual Activity    Alcohol use: Yes     Alcohol/week: 0.0 standard drinks of alcohol     Comment: Socially    Drug use: No    Sexual activity: Yes      Family History   Problem Relation  Age of Onset    Hypertension Father     Skin cancer Father         skin    Other (Other) Father     Cancer Father         skin cancer on ear    Diabetes Neg     Glaucoma Neg       Allergies   Allergen Reactions    Seasonal Runny nose        REVIEW OF SYSTEMS:   Review of Systems   Review of Systems   Constitutional: Negative for activity change, appetite change and fever.   HENT: Negative for congestion and voice change.    Respiratory: Negative for cough and shortness of breath.    Cardiovascular: Negative for chest pain.   Gastrointestinal: Negative for abdominal distention, abdominal pain and vomiting.   Genitourinary: Negative for hematuria.   Skin: Negative for wound.   Psychiatric/Behavioral: Negative for behavioral problems.   Wt Readings from Last 5 Encounters:   09/20/24 242 lb (109.8 kg)   09/14/23 240 lb (108.9 kg)   08/29/22 242 lb (109.8 kg)   06/22/22 242 lb (109.8 kg)   03/04/22 246 lb (111.6 kg)     Body mass index is 32.82 kg/m².      EXAM:   /78   Pulse 66   Ht 6' (1.829 m)   Wt 242 lb (109.8 kg)   SpO2 98%   BMI 32.82 kg/m²   Physical Exam   Constitutional:       Appearance: Normal appearance.   HENT:      Head: Normocephalic.   Eyes:      Conjunctiva/sclera: Conjunctivae normal.   Cardiovascular:      Rate and Rhythm: Normal rate and regular rhythm.      Heart sounds: Normal heart sounds. No murmur heard.  Pulmonary:      Effort: Pulmonary effort is normal.      Breath sounds: Normal breath sounds. No rhonchi or rales.   Abdominal:      General: Bowel sounds are normal.      Palpations: Abdomen is soft.      Tenderness: There is no abdominal tenderness.   Musculoskeletal:      Cervical back: Neck supple.      Right lower leg: No edema.      Left lower leg: No edema.   Skin:     General: Skin is warm and dry.   Neurological:      General: No focal deficit present.      Mental Status: He is alert and oriented to person, place, and time. Mental status is at baseline.   Psychiatric:          Mood and Affect: Mood normal.         Behavior: Behavior normal.       ASSESSMENT AND PLAN:   1. Adult general medical exam  Continue to eat healthy.  Fruits and vegetables.  Exercise as tolerated.  Colonoscopy.  PSA ordered.  Reviewed vaccines.  Discussed regarding Shingrix vaccine.  He will get flu vaccine.  Labs ordered.    2. Dyslipidemia  Continue statins    3. Essential hypertension  Controlled    Achilles discomfort with constant shoe usage-I have encouraged him to apply ice.  If no improvement, can see podiatry for orthotics    Plan: As above.  I will see him back in 1 year      The patient indicates understanding of these issues and agrees to the plan.  No follow-ups on file.    This note was prepared using Dragon Medical voice recognition dictation software. As a result errors may occur. When identified these errors have been corrected. While every attempt is made to correct errors during dictation discrepancies may still exist.

## 2024-10-10 ENCOUNTER — TELEPHONE (OUTPATIENT)
Dept: ORTHOPEDICS CLINIC | Facility: CLINIC | Age: 49
End: 2024-10-10

## 2024-10-10 DIAGNOSIS — M76.60 PAIN IN ACHILLES TENDON: Primary | ICD-10-CM

## 2024-10-10 NOTE — TELEPHONE ENCOUNTER
Patient scheduled in [facility] for right achilles pain. Please advise if imaging is needed.  Future Appointments   Date Time Provider Department Center   10/30/2024  8:20 AM Din[facility] Escobar MD [id number]

## 2024-10-15 NOTE — TELEPHONE ENCOUNTER
Future Appointments   Date Time Provider Department Center   10/30/2024  8:05 AM [facility]   10/30/2024  8:20 AM [facility]

## 2024-10-30 ENCOUNTER — HOSPITAL ENCOUNTER (OUTPATIENT)
Dept: GENERAL RADIOLOGY | Age: 49
Discharge: HOME | End: 2024-10-30
Attending: ORTHOPAEDIC SURGERY
Payer: COMMERCIAL

## 2024-10-30 ENCOUNTER — OFFICE VISIT (OUTPATIENT)
Dept: ORTHOPEDICS CLINIC | Facility: CLINIC | Age: 49
End: 2024-10-30
Payer: COMMERCIAL

## 2024-10-30 ENCOUNTER — PATIENT MESSAGE (OUTPATIENT)
Dept: ORTHOPEDICS CLINIC | Facility: CLINIC | Age: 49
End: 2024-10-30

## 2024-10-30 VITALS — BODY MASS INDEX: 32.23 KG/M2 | HEIGHT: 72 IN | WEIGHT: 238 LBS

## 2024-10-30 DIAGNOSIS — M76.60 PAIN IN ACHILLES TENDON: Primary | ICD-10-CM

## 2024-10-30 DIAGNOSIS — M76.60 PAIN IN ACHILLES TENDON: ICD-10-CM

## 2024-10-30 DIAGNOSIS — M76.61 ACHILLES TENDINITIS OF RIGHT LOWER EXTREMITY: ICD-10-CM

## 2024-10-30 PROCEDURE — 99203 OFFICE O/P NEW LOW 30 MIN: CPT | Performed by: ORTHOPAEDIC SURGERY

## 2024-10-30 PROCEDURE — 73610 X-RAY EXAM OF ANKLE: CPT | Performed by: ORTHOPAEDIC SURGERY

## 2024-10-30 NOTE — H&P
Orthopedic Surgery  [address] [place] [id number]  [phone]     NEW PATIENT VISIT - HISTORY AND PHYSICAL EXAMINATION     Name: Sonny Baker   MRN: [id number]  Date: 10/30/2024     CC: Right achilles Pain    REFERRED BY: Damien Choi MD    HPI:   Sonny Baker is a very pleasant 49 year old male who presents today for evaluation of right Achilles pain ongoing since summer 2024. Pain is 2/10 with stiffness and instability. This improves with rest and worsens with activity. This is affecting his day-to-day activities. Reports waking up at night with throbbing pain. He notes soreness while walking barefoot.    He lives with his wife and child. 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    PMH:   Past Medical History:    Biceps muscle tear    Blood in stool    Essential hypertension    Hemorrhoids    High blood pressure    High cholesterol    Physical exam, annual       PAST SURGICAL HX:  Past Surgical History:   Procedure Laterality Date    Arthroscopy biceps tenodesis Left 2012    Colonoscopy N/A 3/4/2022    Procedure: COLONOSCOPY;  Surgeon: Juan Ramon Hugo MD;  [facility] ENDO    Shoulder surg proc unlisted Right 12/1/2009    Shoulder surg proc unlisted      left Shoulder        FAMILY HX:  Family History   Problem Relation Age of Onset    Hypertension Father     Skin cancer Father         skin    Other (Other) Father     Cancer Father         skin cancer on ear    Diabetes Neg     Glaucoma Neg        ALLERGIES:  Seasonal    MEDICATIONS:   Current Outpatient Medications   Medication Sig Dispense Refill    atorvastatin 10 MG Oral Tab Take 1 tablet (10 mg total) by mouth nightly. 90 tablet 3    lisinopril-hydroCHLOROthiazide 10-12.5 MG Oral Tab Take 1 tablet by mouth daily. 90 tablet 3    cholecalciferol 50 MCG (2000 UT) Oral Cap Take 1 capsule (2,000 Units total) by mouth daily.         ROS: A comprehensive 14 point review of systems was performed and was negative aside from the  aforementioned per history of present illness.    SOCIAL HX:  Social History     Tobacco Use    Smoking status: Never    Smokeless tobacco: Never   Substance Use Topics    Alcohol use: Yes     Alcohol/week: 0.0 standard drinks of alcohol     Comment: Socially         PE:   Vitals:    10/30/24 0816   Weight: 238 lb   Height: 6' (1.829 m)     Estimated body mass index is 32.28 kg/m² as calculated from the following:    Height as of this encounter: 6' (1.829 m).    Weight as of this encounter: 238 lb.    Physical Exam  Constitutional:       Appearance: Normal appearance.   HENT:      Head: Normocephalic and atraumatic.   Eyes:      Extraocular Movements: Extraocular movements intact.   Neck:      Musculoskeletal: Normal range of motion and neck supple.   Cardiovascular:      Pulses: Normal pulses.   Pulmonary:      Effort: Pulmonary effort is normal. No respiratory distress.   Abdominal:      General: There is no distension.   Skin:     General: Skin is warm.      Capillary Refill: Capillary refill takes less than 2 seconds.      Findings: No bruising.   Neurological:      General: No focal deficit present.      Mental Status: Alert.   Psychiatric:         Mood and Affect: Mood normal.     Examination of the right achilles demonstrates:     Skin is intact, warm and dry.     Inspection:   Atrophy: none    Effusion: none    Edema: none    Erythema: none    Hematoma: none      Palpation:   Anterior Talo-Fibular Ligament (ATFL): none    Fibular Ligament (PTFL): none    Calcaneo-Fibular Ligament (CFL): none    Achilles Tendon: none    Peroneal Tendon: none    Posterior Tib Tendon: none    Talar dome: none    Metatarsal bones: none    Joint line tenderness: none  Crepitation: none     Focal tenderness overlying the calcaneus at the Achilles insertion.     ROM:   Dorsiflexion: 10 degrees.  Plantarflexion: 40 degrees.  Eversion:  20 degrees  Inversion: 30 degrees.    Strength: normal     Special Tests:   Anterior Drawer Test  ATFL Rupture: Negative  CFL Forced Inversion: Negative   Talar Dome:  Negative   Gait:  normal   Leg length: equal and symmetric  Alignment:  neutral     No obvious peripheral edema noted.   Distal neurovascular exam demonstrates normal perfusion, intact sensation to light touch and full strength.     Examination of the contralateral foot/ankle demonstrates:  No significant atrophy, swelling or effusion. Full range of motion. Neurovascularly intact distally.    Radiographic Examination/Diagnostics:  Ankle XR personally viewed, independently interpreted and radiology report was reviewed.    XR ANKLE (MIN 3 VIEWS), RIGHT (CPT=73610)    Result Date: 10/30/2024  CONCLUSION:    Large spur at the insertion of the Achilles tendon onto the calcaneus 15 mm cephalocaudal dimension, 7 mm AP thickness, along with thickened this of the soft tissues of the distal Achilles most likely reflecting chronic Achilles tendinopathy.  No acute fracture dislocation.  Mild ankle degenerative changes.  The talar dome appears smooth in the ankle mortise shows no disruption. LOCATION:  [facility]   Dictated by (CST): Cale Aden MD on 10/30/2024 at 8:37 AM     Finalized by (CST): Cale Aden MD on 10/30/2024 at 8:38 AM        IMPRESSION: Sonny Baker is a 49 year old male with right Achilles tendinitis symptomatic since summer 2024.     Fortunately, treatment is amenable to physical therapy and a gel heel cup.    PLAN:   We had a detailed discussion outlining the etiology, anatomy, pathophysiology, and natural history of patient's findings. Imaging was reviewed in detail.    We reviewed the treatment of this disease condition.  Fortunately, treatment is amenable to conservative treatment which we chose to optimize at today's visit.  I recommended physical therapy to aid in strengthening, range of motion, functional improvement, and return to baseline activity.      We also provided a gel heel cup. Discussed further evaluation with  Dr. Karis Beasley if symptoms do not improve / resolve.      The patient had the opportunity to ask questions and all questions were answered appropriately.      FOLLOW-UP:  Return to clinic on an as needed basis.         Dinorah Escobar MD  Knee, Shoulder, & Elbow Surgery / Sports Medicine Specialist  Orthopaedic Surgery  [address] [place] [address] [id number] [place] [address].[facility]  [email or web address]  t: [phone]  o: [phone]  f: [phone]    This note was dictated using Dragon software.  While it was briefly proofread prior to completion, some grammatical, spelling, and word choice errors due to dictation may still occur.

## 2024-10-30 NOTE — TELEPHONE ENCOUNTER
LOV 10/30/24  PT was recommended and gel heel cup given.    Pt states that surgery was discussed as option if PT does not provide relief.   Pt asking if \"I felt it was better to just get it cleaned out and hopefully then not have to deal with this spur affecting it in the future would that be an option also at this time?\"

## 2024-11-04 ENCOUNTER — TELEPHONE (OUTPATIENT)
Dept: ORTHOPEDICS CLINIC | Facility: CLINIC | Age: 49
End: 2024-11-04

## 2024-11-04 NOTE — TELEPHONE ENCOUNTER
Spoke with patient and offered appointment on 12/4/24 at 4pm with Dr Rivera and he accepted. Patient still on wait list. He had no further concerns.

## 2024-12-04 ENCOUNTER — OFFICE VISIT (OUTPATIENT)
Dept: ORTHOPEDICS CLINIC | Facility: CLINIC | Age: 49
End: 2024-12-04
Payer: COMMERCIAL

## 2024-12-04 DIAGNOSIS — M76.61 ACHILLES TENDINITIS OF RIGHT LOWER EXTREMITY: Primary | ICD-10-CM

## 2024-12-04 DIAGNOSIS — M92.61 HAGLUND DEFORMITY OF RIGHT HEEL: ICD-10-CM

## 2024-12-04 PROCEDURE — 99204 OFFICE O/P NEW MOD 45 MIN: CPT | Performed by: ORTHOPAEDIC SURGERY

## 2024-12-04 NOTE — H&P
[facility] New Patient Note    CC:   Chief Complaint   Patient presents with    Ankle Pain     Consult - Referred by Shawn for Right heel pain. Patient rates his pain 3/10 at this time. Patient gets worst if he's on his feet all day and he states that the pain is bad first thing in the morning and at night.        HPI: This 49 year old male presents today with complaints of right achilles pain that has been present since June. He has tried NSAIDs, physical therapy exercises and activity modifications for the past few months but continues to have pain. He has pain that worsens when he plays with his 6 year old. He tries to play men's league hockey but that causes more pain.    Past Medical History:    Biceps muscle tear    Blood in stool    Essential hypertension    Hemorrhoids    High blood pressure    High cholesterol    Physical exam, annual     Past Surgical History:   Procedure Laterality Date    Arthroscopy biceps tenodesis Left 2012    Colonoscopy N/A 3/4/2022    Procedure: COLONOSCOPY;  Surgeon: Juan Ramon Hugo MD;  Location: [facility] ENDO    Shoulder surg proc unlisted Right 12/1/2009    Shoulder surg proc unlisted      left Shoulder      Current Outpatient Medications   Medication Sig Dispense Refill    atorvastatin 10 MG Oral Tab Take 1 tablet (10 mg total) by mouth nightly. 90 tablet 3    lisinopril-hydroCHLOROthiazide 10-12.5 MG Oral Tab Take 1 tablet by mouth daily. 90 tablet 3    cholecalciferol 50 MCG (2000 UT) Oral Cap Take 1 capsule (2,000 Units total) by mouth daily.       Allergies[1]  Family History   Problem Relation Age of Onset    Hypertension Father     Skin cancer Father         skin    Other (Other) Father     Cancer Father         skin cancer on ear    Diabetes Neg     Glaucoma Neg      Social History     Occupational History    Not on file   Tobacco Use    Smoking status: Never    Smokeless tobacco: Never   Vaping Use    Vaping status: Never Used   Substance and Sexual  Activity    Alcohol use: Yes     Alcohol/week: 0.0 standard drinks of alcohol     Comment: Socially    Drug use: No    Sexual activity: Yes          Physical Exam:    There were no vitals taken for this visit.  General: Awake, alert, no distress.   Psychological: Appropriate affect.  Respiratory: Unlabored breathing.  Gait: antalgic gait  Right Ankle neutral alignment, TTP Achilles insertion and posterior calcaneus. Ankle df to neutral with knee extension, hamstring tightness present  DF/PF/EHL intact, SILT, cap refill brisk        Imaging: 3v R ankle personally viewed, independently interpreted and radiology report read. Significant posterior calcaneus bony hypertrophy with calcification of distal achilles insertion.      Labs: hgb A1c mildly elevated 5.9, CMP normal, CBC normal      Assessment/Plan:  1. Achilles tendinitis of right lower extremity  Pt has failed over 3 months of conservative management including NSAIDs, PT exercises, shoewear modifications and activity modifications and would benefit from an MRI for preoperative planning  - MRI ANKLE, RIGHT (CPT=73721); Future    2. Diane deformity of right heel  Surgery we discussed with a right insertional secondary Achilles tendon repair, partial excision calcaneus. I explained he will be NWB x 6 weeks post-operatively followed by WB in Boot with heel lift for 4 weeks.         Karis Beasley,   12/4/2024         [1]   Allergies  Allergen Reactions    Seasonal Runny nose

## 2024-12-04 NOTE — H&P (VIEW-ONLY)
[facility] New Patient Note    CC:   Chief Complaint   Patient presents with    Ankle Pain     Consult - Referred by Shawn for Right heel pain. Patient rates his pain 3/10 at this time. Patient gets worst if he's on his feet all day and he states that the pain is bad first thing in the morning and at night.        HPI: This 49 year old male presents today with complaints of right achilles pain that has been present since June. He has tried NSAIDs, physical therapy exercises and activity modifications for the past few months but continues to have pain. He has pain that worsens when he plays with his 6 year old. He tries to play men's league hockey but that causes more pain.    Past Medical History:    Biceps muscle tear    Blood in stool    Essential hypertension    Hemorrhoids    High blood pressure    High cholesterol    Physical exam, annual     Past Surgical History:   Procedure Laterality Date    Arthroscopy biceps tenodesis Left 2012    Colonoscopy N/A 3/4/2022    Procedure: COLONOSCOPY;  Surgeon: Juan Ramon Hugo MD;  Location: [facility] ENDO    Shoulder surg proc unlisted Right 12/1/2009    Shoulder surg proc unlisted      left Shoulder      Current Outpatient Medications   Medication Sig Dispense Refill    atorvastatin 10 MG Oral Tab Take 1 tablet (10 mg total) by mouth nightly. 90 tablet 3    lisinopril-hydroCHLOROthiazide 10-12.5 MG Oral Tab Take 1 tablet by mouth daily. 90 tablet 3    cholecalciferol 50 MCG (2000 UT) Oral Cap Take 1 capsule (2,000 Units total) by mouth daily.       Allergies[1]  Family History   Problem Relation Age of Onset    Hypertension Father     Skin cancer Father         skin    Other (Other) Father     Cancer Father         skin cancer on ear    Diabetes Neg     Glaucoma Neg      Social History     Occupational History    Not on file   Tobacco Use    Smoking status: Never    Smokeless tobacco: Never   Vaping Use    Vaping status: Never Used   Substance and Sexual  Activity    Alcohol use: Yes     Alcohol/week: 0.0 standard drinks of alcohol     Comment: Socially    Drug use: No    Sexual activity: Yes          Physical Exam:    There were no vitals taken for this visit.  General: Awake, alert, no distress.   Psychological: Appropriate affect.  Respiratory: Unlabored breathing.  Gait: antalgic gait  Right Ankle neutral alignment, TTP Achilles insertion and posterior calcaneus. Ankle df to neutral with knee extension, hamstring tightness present  DF/PF/EHL intact, SILT, cap refill brisk        Imaging: 3v R ankle personally viewed, independently interpreted and radiology report read. Significant posterior calcaneus bony hypertrophy with calcification of distal achilles insertion.      Labs: hgb A1c mildly elevated 5.9, CMP normal, CBC normal      Assessment/Plan:  1. Achilles tendinitis of right lower extremity  Pt has failed over 3 months of conservative management including NSAIDs, PT exercises, shoewear modifications and activity modifications and would benefit from an MRI for preoperative planning  - MRI ANKLE, RIGHT (CPT=73721); Future    2. Diane deformity of right heel  Surgery we discussed with a right insertional secondary Achilles tendon repair, partial excision calcaneus. I explained he will be NWB x 6 weeks post-operatively followed by WB in Boot with heel lift for 4 weeks.         Karis Beasley,   12/4/2024         [1]   Allergies  Allergen Reactions    Seasonal Runny nose

## 2024-12-10 ENCOUNTER — TELEPHONE (OUTPATIENT)
Dept: ORTHOPEDICS CLINIC | Facility: CLINIC | Age: 49
End: 2024-12-10

## 2024-12-10 ENCOUNTER — TELEPHONE (OUTPATIENT)
Dept: CASE MANAGEMENT | Age: 49
End: 2024-12-10

## 2024-12-10 DIAGNOSIS — M92.61 HAGLUND DEFORMITY OF RIGHT HEEL: ICD-10-CM

## 2024-12-10 DIAGNOSIS — M76.61 ACHILLES TENDINITIS OF RIGHT LOWER EXTREMITY: Primary | ICD-10-CM

## 2024-12-10 NOTE — TELEPHONE ENCOUNTER
Per patient was told MRI has been denied by insurance, states office needs to call [phone], patient was given reference number [id number]. States insurance is missing information, they need clinicals demonstrating why imaging is needed, also if any previous imaging was done. States office can daryl this as urgent so decision can be made in 2 days. Please advise thank you.

## 2024-12-10 NOTE — TELEPHONE ENCOUNTER
Type of surgery:  Right insertional Achilles Tendon Repair, Partial Excision Calcaneus, Possible Gastrocnemius Recession    Date: 1/2/25  Location: [facility]  Medical Clearance: No     *Medical:      *Dental:      *Other:  Prior Authorization Status: Pending   Workers Comp:  Medacta/Sergio:  Sciota: Yes  POV: 1/8/25

## 2024-12-10 NOTE — TELEPHONE ENCOUNTER
Called [facility] and transferred to JESSICA Anand and informed patient has had 6 weeks of conservative treatment per Dr Rivera office note. MRI approved.   Auth#[id number] expires 6/3/2025.   Called patient Left message to call back. Mychart sent to patient.

## 2024-12-10 NOTE — TELEPHONE ENCOUNTER
Hi Dr. Beasley        Status: DENIED        Reference number [id number]     A copy of the denial letter is filed under the MEDIA tab, reference for complete details. You may reach out to [facility] at [phone] to discuss decision.     Please reach out to patient with plan of care.      Thank you  Kath COVINGTON

## 2024-12-10 NOTE — TELEPHONE ENCOUNTER
Per patient states Dr. Rivera mentioned patient might need scooter after surgery, was told by insurance prior authorization would be needed for this, asking if office can initiate this now. Please advise thank you.

## 2024-12-11 ENCOUNTER — HOSPITAL ENCOUNTER (OUTPATIENT)
Dept: MRI IMAGING | Facility: HOSPITAL | Age: 49
Discharge: HOME | End: 2024-12-11
Attending: ORTHOPAEDIC SURGERY
Payer: COMMERCIAL

## 2024-12-11 DIAGNOSIS — M76.61 ACHILLES TENDINITIS OF RIGHT LOWER EXTREMITY: ICD-10-CM

## 2024-12-11 PROCEDURE — 73721 MRI JNT OF LWR EXTRE W/O DYE: CPT | Performed by: ORTHOPAEDIC SURGERY

## 2024-12-16 ENCOUNTER — TELEPHONE (OUTPATIENT)
Dept: ORTHOPEDICS CLINIC | Facility: CLINIC | Age: 49
End: 2024-12-16

## 2024-12-16 NOTE — TELEPHONE ENCOUNTER
Per patient he sent a mychart with the information to sent the prescription for scooter. Per patient he is experiencing pain as of this weekend stating his leg feels tight and hard to walk on. Per patient has been taking ibuprofen and asking if there is anything else he could do to help the pain. Please advise

## 2024-12-16 NOTE — TELEPHONE ENCOUNTER
Spoke with patient. Per patient I faxed order to [facility] at [phone]. Confirmation received..     He reports that over the weekend he endorses new onset pain originating in heel but radiating up back of lower leg. He denies swelling, warmth, but states it feels tight, and is difficult to walk r/t the stiffness. He states the area is tender to the touch. He has been icing, elevating, and taking ibuprofen.

## 2024-12-17 ENCOUNTER — TELEPHONE (OUTPATIENT)
Dept: ORTHOPEDICS CLINIC | Facility: CLINIC | Age: 49
End: 2024-12-17

## 2024-12-17 ENCOUNTER — PATIENT MESSAGE (OUTPATIENT)
Dept: ORTHOPEDICS CLINIC | Facility: CLINIC | Age: 49
End: 2024-12-17

## 2024-12-17 ENCOUNTER — TELEPHONE (OUTPATIENT)
Dept: INTERNAL MEDICINE CLINIC | Facility: CLINIC | Age: 49
End: 2024-12-17

## 2024-12-17 DIAGNOSIS — Z47.89 ORTHOPEDIC AFTERCARE: ICD-10-CM

## 2024-12-17 DIAGNOSIS — M76.61 ACHILLES TENDINITIS OF RIGHT LOWER EXTREMITY: Primary | ICD-10-CM

## 2024-12-17 NOTE — TELEPHONE ENCOUNTER
Regarding order for cande Metcalf at [facility] requesting demographics, office notes and order has to include height and weight.  Please fax to [phone].  Please include case # [id number].      Order placed per Dr. Beasley.  Routed to clinical staff.

## 2024-12-17 NOTE — TELEPHONE ENCOUNTER
Per Dr Rivera-      \"I would try stretching his back and hamstring. Radiating pain up his leg may be coming from his back. He can try ibuprofen 600 mg q 6 hours as needed for pain. If it worsens or persists, he should follow up with his doctor for further evaluation\"     Called patient Left message to call back.

## 2024-12-18 NOTE — TELEPHONE ENCOUNTER
Per Dr Rivera-        \"I would try stretching his back and hamstring. Radiating pain up his leg may be coming from his back. He can try ibuprofen 600 mg q 6 hours as needed for pain. If it worsens or persists, he should follow up with his doctor for further evaluation\"

## 2024-12-18 NOTE — TELEPHONE ENCOUNTER
Spoke with patient. Relayed response from provider. States that today is a great day painwise. He does however endorse that on bad days his heel get super tight and the tightness goes up a couple of inches where the tendon is. He reports that he has no pain in his hamstring and his back pain is unrelated. I did advise to try the ibuprofen and to stay ahead of the pain instead of waiting for it to get bad. Verbalized understanding.

## 2024-12-20 NOTE — TELEPHONE ENCOUNTER
Per Dr Rivera-  \"Spoke to the patient and reviewed his MRI. He does not have feelings of instability or peroneal pain. His pain is at his achilles insertion. We are going to proceed with the insertional achilles tendon repair as scheduled. I warned him about risks of tendon rupture if he has chronic pain mixed with sudden forceful movements. All questions were answered to his satisfaction\"

## 2024-12-23 ENCOUNTER — TELEPHONE (OUTPATIENT)
Dept: ORTHOPEDICS CLINIC | Facility: CLINIC | Age: 49
End: 2024-12-23

## 2024-12-23 RX ORDER — CHLORAL HYDRATE 500 MG
2000 CAPSULE ORAL DAILY
COMMUNITY

## 2024-12-23 NOTE — TELEPHONE ENCOUNTER
albino flores, from [facility].  Pt is requesting a knee scooter. Will need pt's demographic sheet, order for the knee scooter to include the height and weight and clinicals notes supporting the need for the knee scooter.   Case number is [id number].  Fax [phone]

## 2024-12-24 NOTE — TELEPHONE ENCOUNTER
12/23/24 12:52 PM  Note     [facility]  Pt is requesting a knee scooter. Will need pt's demographic sheet, order for the knee scooter to include the height and weight and clinicals notes supporting the need for the knee scooter.   Case number is [id number].  Fax [phone]

## 2024-12-24 NOTE — TELEPHONE ENCOUNTER
Called [facility] and spoke with Richard MARTINEZ Let her know that I had faxed all requested info to them, confirmed fax number. While on call received confirmation of successful fax. She stated that they already have a vendor lined up for the scooter for patient. She will reach back out if for some reason the fax does not make it to her. She stated they were having some issues.

## 2024-12-24 NOTE — TELEPHONE ENCOUNTER
Reentered order for knee scooter with height and weight. Printed notes, imaging, order, face sheet and faxed to number provided

## 2024-12-30 NOTE — DISCHARGE INSTRUCTIONS
Dr. Beasley's Discharge Instructions    Keep your splint clean, dry, and intact. Do not remove until you follow up in the office.     Ice and elevate your leg. You can place a bag full of ice directly on your splint. When you elevate your leg, put the pillow under your calf and leave your heel free.     Do not put weight on the operative leg. Use crutches, walker, or a knee scooter to help get around.    **If the splint feels too tight- LOOSEN the splint!** You may take off the ace bandage if necessary and reapply it looser. After surgery, you tend to swell which may make the splint feel too tight.    Call the office if you have increased pain/swelling that is not being managed by your pain medicine. You should also call the office if there is increased redness or swelling going beyond your dressing. If there is bleeding that is coming through your dressing, reinforce it with guaze bandages and an ace bandage. Please call the office with any questions.    You may take the hydrocodone for pain relief. This can be taken every 6 hours as needed for pain. If you have breakthrough pain and it's not time for your hydrocodone, you may take 600 mg of ibuprofen every 6 hours as well if you don't have any kidney problems or history of problems with NSAIDs.     You should take 81 mg of aspirin twice a day for 1 month after surgery to prevent blood clots.     You should schedule a follow up appointment for 1 weeks after surgery.       Call Dr. Beasley's office at [phone] with any questions.      Achilles Tendon Rupture, Partial or Complete    The Achilles tendon connects the muscles in the back of your lower leg to your heel bone. It lets you point your foot down. This is called a “step on the gas pedal” motion. This movement is important for walking, running, and jumping. A sudden strong contraction of the lower leg can partially tear (rupture) the Achilles tendon. This injury can happen while playing sports. This injury  is more likely if you have injured the tendon in the past. It is also more likely if the tendon is inflamed from stress.   When the injury happens, you may feel a pop or snap, or like you have been kicked. An Achilles tendon tear will cause swelling, bruising, and pain in the ankle area. You will have trouble walking or pushing off the ground with your foot.   A complete Achilles tear is often treated with surgery to attach the torn ends of the tendon. This is followed by up to 12 weeks in a walking cast, boot, or splint. The injury can also be treated without surgery, but the tendon will take longer to heal. The risk of rupturing it again is also greater than with surgery. With either type of treatment, you may need physical therapy to strengthen your Achilles tendon. It often takes up to 6 months to return to your past level of activity.      HOME INSTRUCTIONS  AMBSURG HOME CARE INSTRUCTIONS: POST-OP ANESTHESIA  The medication that you received for sedation or general anesthesia can last up to 24 hours. Your judgment and reflexes may be altered, even if you feel like your normal self.      We Recommend:   Do not drive any motor vehicle or bicycle   Avoid mowing the lawn, playing sports, or working with power tools/applicances (power saws, electric knives or mixers)   That you have someone stay with you on your first night home   Do not drink alcohol or take sleeping pills or tranquilizers   Do not sign legal documents within 24 hours of your procedure   If you had a nerve block for your surgery, take extra care not to put any pressure on your arm or hand for 24 hours    It is normal:  For you to have a sore throat if you had a breathing tube during surgery (while you were asleep!). The sore throat should get better within 48 hours. You can gargle with warm salt water (1/2 tsp in 4 oz warm water) or use a throat lozenge for comfort  To feel muscle aches or soreness especially in the abdomen, chest or neck. The achy  feeling should go away in the next 24 hours  To feel weak, sleepy or \"wiped out\". Your should start feeling better in the next 24 hours.   To experience mild discomforts such as sore lip or tongue, headache, cramps, gas pains or a bloated feeling in your abdomen.   To experience mild back pain or soreness for a day or two if you had spinal or epidural anesthesia.   If you had laparoscopic surgery, to feel shoulder pain or discomfort on the day of surgery.   For some patients to have nausea after surgery/anesthesia    If you feel nausea or experience vomiting:   Try to move around less.   Eat less than usual or drink only liquids until the next morning   Nausea should resolve in about 24 hours    If you have a problem when you are at home:    Call your surgeons office   Discharge Instructions: After Your Surgery  You’ve just had surgery. During surgery, you were given medicine called anesthesia to keep you relaxed and free of pain. After surgery, you may have some pain or nausea. This is common. Here are some tips for feeling better and getting well after surgery.   Going home  Your healthcare provider will show you how to take care of yourself when you go home. They'll also answer your questions. Have an adult family member or friend drive you home. For the first 24 hours after your surgery:   Don't drive or use heavy equipment.  Don't make important decisions or sign legal papers.  Take medicines as directed.  Don't drink alcohol.  Have someone stay with you, if needed. They can watch for problems and help keep you safe.  Be sure to go to all follow-up visits with your healthcare provider. And rest after your surgery for as long as your provider tells you to.   Coping with pain  If you have pain after surgery, pain medicine will help you feel better. Take it as directed, before pain becomes severe. Also, ask your healthcare provider or pharmacist about other ways to control pain. This might be with heat, ice, or  relaxation. And follow any other instructions your surgeon or nurse gives you.      Stay on schedule with your medicine.     Tips for taking pain medicine  To get the best relief possible, remember these points:   Pain medicines can upset your stomach. Taking them with a little food may help.  Most pain relievers taken by mouth need at least 20 to 30 minutes to start to work.  Don't wait till your pain becomes severe before you take your medicine. Try to time your medicine so that you can take it before starting an activity. This might be before you get dressed, go for a walk, or sit down for dinner.  Constipation is a common side effect of some pain medicines. Call your healthcare provider before taking any medicines such as laxatives or stool softeners to help ease constipation. Also ask if you should skip any foods. Drinking lots of fluids and eating foods such as fruits and vegetables that are high in fiber can also help. Remember, don't take laxatives unless your surgeon has prescribed them.  Drinking alcohol and taking pain medicine can cause dizziness and slow your breathing. It can even be deadly. Don't drink alcohol while taking pain medicine.  Pain medicine can make you react more slowly to things. Don't drive or run machinery while taking pain medicine.  Your healthcare provider may tell you to take acetaminophen to help ease your pain. Ask them how much you're supposed to take each day. Acetaminophen or other pain relievers may interact with your prescription medicines or other over-the-counter (OTC) medicines. Some prescription medicines have acetaminophen and other ingredients in them. Using both prescription and OTC acetaminophen for pain can cause you to accidentally overdose. Read the labels on your OTC medicines with care. This will help you to clearly know the list of ingredients, how much to take, and any warnings. It may also help you not take too much acetaminophen. If you have questions or  don't understand the information, ask your pharmacist or healthcare provider to explain it to you before you take the OTC medicine.   Managing nausea  Some people have an upset stomach (nausea) after surgery. This is often because of anesthesia, pain, or pain medicine, less movement of food in the stomach, or the stress of surgery. These tips will help you handle nausea and eat healthy foods as you get better. If you were on a special food plan before surgery, ask your healthcare provider if you should follow it while you get better. Check with your provider on how your eating should progress. It may depend on the surgery you had. These general tips may help:   Don't push yourself to eat. Your body will tell you when to eat and how much.  Start off with clear liquids and soup. They're easier to digest.  Next try semi-solid foods as you feel ready. These include mashed potatoes, applesauce, and gelatin.  Slowly move to solid foods. Don’t eat fatty, rich, or spicy foods at first.  Don't force yourself to have 3 large meals a day. Instead eat smaller amounts more often.  Take pain medicines with a small amount of solid food, such as crackers or toast. This helps prevent nausea.  When to call your healthcare provider  Call your healthcare provider right away if you have any of these:   You still have too much pain, or the pain gets worse, after taking the medicine. The medicine may not be strong enough. Or there may be a complication from the surgery.  You feel too sleepy, dizzy, or groggy. The medicine may be too strong.  Side effects such as nausea or vomiting. Your healthcare provider may advise taking other medicines to .  Skin changes such as rash, itching, or hives. This may mean you have an allergic reaction. Your provider may advise taking other medicines.  The incision looks different (for instance, part of it opens up).  Bleeding or fluid leaking from the incision site, and weren't told to expect that.  Fever  of 100.4°F (38°C) or higher, or as directed by your provider.  Call 911  Call 911 right away if you have:   Trouble breathing  Facial swelling    If you have obstructive sleep apnea   You were given anesthesia medicine during surgery to keep you comfortable and free of pain. After surgery, you may have more apnea spells because of this medicine and other medicines you were given. The spells may last longer than normal.    At home:  Keep using the continuous positive airway pressure (CPAP) device when you sleep. Unless your healthcare provider tells you not to, use it when you sleep, day or night. CPAP is a common device used to treat obstructive sleep apnea.  Talk with your provider before taking any pain medicine, muscle relaxants, or sedatives. Your provider will tell you about the possible dangers of taking these medicines.  Contact your provider if your sleeping changes a lot even when taking medicines as directed.  [employer] last reviewed this educational content on 10/1/2021  © 5466-6043 [employer]. All rights reserved. This information is not intended as a substitute for professional medical care. Always follow your healthcare professional's instructions.

## 2025-01-02 ENCOUNTER — HOSPITAL ENCOUNTER (OUTPATIENT)
Facility: HOSPITAL | Age: 50
Setting detail: HOSPITAL OUTPATIENT SURGERY
Discharge: HOME | End: 2025-01-02
Attending: ORTHOPAEDIC SURGERY | Admitting: ORTHOPAEDIC SURGERY
Payer: COMMERCIAL

## 2025-01-02 ENCOUNTER — APPOINTMENT (OUTPATIENT)
Dept: GENERAL RADIOLOGY | Facility: HOSPITAL | Age: 50
End: 2025-01-02
Attending: ORTHOPAEDIC SURGERY
Payer: COMMERCIAL

## 2025-01-02 ENCOUNTER — ANESTHESIA (OUTPATIENT)
Dept: SURGERY | Facility: HOSPITAL | Age: 50
End: 2025-01-02
Payer: COMMERCIAL

## 2025-01-02 ENCOUNTER — ANESTHESIA EVENT (OUTPATIENT)
Dept: SURGERY | Facility: HOSPITAL | Age: 50
End: 2025-01-02
Payer: COMMERCIAL

## 2025-01-02 VITALS
HEART RATE: 68 BPM | DIASTOLIC BLOOD PRESSURE: 84 MMHG | TEMPERATURE: 99 F | OXYGEN SATURATION: 100 % | HEIGHT: 72 IN | SYSTOLIC BLOOD PRESSURE: 136 MMHG | RESPIRATION RATE: 12 BRPM | BODY MASS INDEX: 33.05 KG/M2 | WEIGHT: 244 LBS

## 2025-01-02 DIAGNOSIS — S86.011D ACHILLES TENDON TEAR, RIGHT, SUBSEQUENT ENCOUNTER: Primary | ICD-10-CM

## 2025-01-02 PROCEDURE — 0QBL0ZZ EXCISION OF RIGHT TARSAL, OPEN APPROACH: ICD-10-PCS | Performed by: ORTHOPAEDIC SURGERY

## 2025-01-02 PROCEDURE — 0KNS0ZZ RELEASE RIGHT LOWER LEG MUSCLE, OPEN APPROACH: ICD-10-PCS | Performed by: ORTHOPAEDIC SURGERY

## 2025-01-02 PROCEDURE — 73650 X-RAY EXAM OF HEEL: CPT | Performed by: ORTHOPAEDIC SURGERY

## 2025-01-02 PROCEDURE — 0LNS0ZZ RELEASE RIGHT ANKLE TENDON, OPEN APPROACH: ICD-10-PCS | Performed by: ORTHOPAEDIC SURGERY

## 2025-01-02 PROCEDURE — 0LQN0ZZ REPAIR RIGHT LOWER LEG TENDON, OPEN APPROACH: ICD-10-PCS | Performed by: ORTHOPAEDIC SURGERY

## 2025-01-02 PROCEDURE — 27654 REPAIR OF ACHILLES TENDON: CPT | Performed by: ORTHOPAEDIC SURGERY

## 2025-01-02 PROCEDURE — 28120 PART REMOVAL OF ANKLE/HEEL: CPT | Performed by: ORTHOPAEDIC SURGERY

## 2025-01-02 DEVICE — BC ACHILLES SPDBRG W/ KL DX CC, 3.9 MM
Type: IMPLANTABLE DEVICE | Site: ANKLE | Status: FUNCTIONAL
Brand: ARTHREX®

## 2025-01-02 RX ORDER — ASPIRIN 81 MG/1
81 TABLET ORAL 2 TIMES DAILY
Qty: 60 TABLET | Refills: 0 | Status: SHIPPED | OUTPATIENT
Start: 2025-01-02

## 2025-01-02 RX ORDER — FAMOTIDINE 20 MG/1
20 TABLET, FILM COATED ORAL ONCE
Status: COMPLETED | OUTPATIENT
Start: 2025-01-02 | End: 2025-01-02

## 2025-01-02 RX ORDER — SODIUM CHLORIDE, SODIUM LACTATE, POTASSIUM CHLORIDE, CALCIUM CHLORIDE 600; 310; 30; 20 MG/100ML; MG/100ML; MG/100ML; MG/100ML
INJECTION, SOLUTION INTRAVENOUS CONTINUOUS
Status: DISCONTINUED | OUTPATIENT
Start: 2025-01-02 | End: 2025-01-02

## 2025-01-02 RX ORDER — HYDROMORPHONE HYDROCHLORIDE 1 MG/ML
0.4 INJECTION, SOLUTION INTRAMUSCULAR; INTRAVENOUS; SUBCUTANEOUS EVERY 5 MIN PRN
Status: DISCONTINUED | OUTPATIENT
Start: 2025-01-02 | End: 2025-01-02

## 2025-01-02 RX ORDER — HYDROMORPHONE HYDROCHLORIDE 1 MG/ML
0.4 INJECTION, SOLUTION INTRAMUSCULAR; INTRAVENOUS; SUBCUTANEOUS EVERY 2 HOUR PRN
Status: DISCONTINUED | OUTPATIENT
Start: 2025-01-02 | End: 2025-01-02

## 2025-01-02 RX ORDER — NALOXONE HYDROCHLORIDE 0.4 MG/ML
0.08 INJECTION, SOLUTION INTRAMUSCULAR; INTRAVENOUS; SUBCUTANEOUS AS NEEDED
Status: DISCONTINUED | OUTPATIENT
Start: 2025-01-02 | End: 2025-01-02

## 2025-01-02 RX ORDER — HYDROCODONE BITARTRATE AND ACETAMINOPHEN 5; 325 MG/1; MG/1
1 TABLET ORAL EVERY 6 HOURS PRN
Qty: 24 TABLET | Refills: 0 | Status: SHIPPED | OUTPATIENT
Start: 2025-01-02

## 2025-01-02 RX ORDER — DOCUSATE SODIUM 100 MG/1
100 CAPSULE, LIQUID FILLED ORAL 2 TIMES DAILY PRN
Qty: 90 CAPSULE | Refills: 0 | Status: SHIPPED | OUTPATIENT
Start: 2025-01-02

## 2025-01-02 RX ORDER — DEXAMETHASONE SODIUM PHOSPHATE 10 MG/ML
INJECTION, SOLUTION INTRAMUSCULAR; INTRAVENOUS
Status: COMPLETED | OUTPATIENT
Start: 2025-01-02 | End: 2025-01-02

## 2025-01-02 RX ORDER — ROCURONIUM BROMIDE 10 MG/ML
INJECTION, SOLUTION INTRAVENOUS AS NEEDED
Status: DISCONTINUED | OUTPATIENT
Start: 2025-01-02 | End: 2025-01-02 | Stop reason: SURG

## 2025-01-02 RX ORDER — HYDROMORPHONE HYDROCHLORIDE 1 MG/ML
0.6 INJECTION, SOLUTION INTRAMUSCULAR; INTRAVENOUS; SUBCUTANEOUS EVERY 5 MIN PRN
Status: DISCONTINUED | OUTPATIENT
Start: 2025-01-02 | End: 2025-01-02

## 2025-01-02 RX ORDER — MORPHINE SULFATE 4 MG/ML
2 INJECTION, SOLUTION INTRAMUSCULAR; INTRAVENOUS EVERY 10 MIN PRN
Status: DISCONTINUED | OUTPATIENT
Start: 2025-01-02 | End: 2025-01-02

## 2025-01-02 RX ORDER — FAMOTIDINE 10 MG/ML
20 INJECTION, SOLUTION INTRAVENOUS ONCE
Status: COMPLETED | OUTPATIENT
Start: 2025-01-02 | End: 2025-01-02

## 2025-01-02 RX ORDER — NEOSTIGMINE METHYLSULFATE 1 MG/ML
INJECTION INTRAVENOUS AS NEEDED
Status: DISCONTINUED | OUTPATIENT
Start: 2025-01-02 | End: 2025-01-02 | Stop reason: SURG

## 2025-01-02 RX ORDER — LIDOCAINE HYDROCHLORIDE 10 MG/ML
INJECTION, SOLUTION EPIDURAL; INFILTRATION; INTRACAUDAL; PERINEURAL AS NEEDED
Status: DISCONTINUED | OUTPATIENT
Start: 2025-01-02 | End: 2025-01-02 | Stop reason: SURG

## 2025-01-02 RX ORDER — MIDAZOLAM HYDROCHLORIDE 1 MG/ML
INJECTION INTRAMUSCULAR; INTRAVENOUS
Status: COMPLETED | OUTPATIENT
Start: 2025-01-02 | End: 2025-01-02

## 2025-01-02 RX ORDER — ONDANSETRON 2 MG/ML
4 INJECTION INTRAMUSCULAR; INTRAVENOUS EVERY 6 HOURS PRN
Status: DISCONTINUED | OUTPATIENT
Start: 2025-01-02 | End: 2025-01-02

## 2025-01-02 RX ORDER — PROCHLORPERAZINE EDISYLATE 5 MG/ML
5 INJECTION INTRAMUSCULAR; INTRAVENOUS EVERY 8 HOURS PRN
Status: DISCONTINUED | OUTPATIENT
Start: 2025-01-02 | End: 2025-01-02

## 2025-01-02 RX ORDER — GLYCOPYRROLATE 0.2 MG/ML
INJECTION, SOLUTION INTRAMUSCULAR; INTRAVENOUS AS NEEDED
Status: DISCONTINUED | OUTPATIENT
Start: 2025-01-02 | End: 2025-01-02 | Stop reason: SURG

## 2025-01-02 RX ORDER — OXYCODONE HYDROCHLORIDE 5 MG/1
10 TABLET ORAL EVERY 4 HOURS PRN
Status: DISCONTINUED | OUTPATIENT
Start: 2025-01-02 | End: 2025-01-02

## 2025-01-02 RX ORDER — ROPIVACAINE HYDROCHLORIDE 5 MG/ML
INJECTION, SOLUTION EPIDURAL; INFILTRATION; PERINEURAL
Status: COMPLETED | OUTPATIENT
Start: 2025-01-02 | End: 2025-01-02

## 2025-01-02 RX ORDER — MORPHINE SULFATE 10 MG/ML
6 INJECTION, SOLUTION INTRAMUSCULAR; INTRAVENOUS EVERY 10 MIN PRN
Status: DISCONTINUED | OUTPATIENT
Start: 2025-01-02 | End: 2025-01-02

## 2025-01-02 RX ORDER — METOCLOPRAMIDE 10 MG/1
10 TABLET ORAL ONCE
Status: COMPLETED | OUTPATIENT
Start: 2025-01-02 | End: 2025-01-02

## 2025-01-02 RX ORDER — IBUPROFEN 600 MG/1
600 TABLET, FILM COATED ORAL EVERY 6 HOURS PRN
Qty: 60 TABLET | Refills: 0 | Status: SHIPPED | OUTPATIENT
Start: 2025-01-02

## 2025-01-02 RX ORDER — METOCLOPRAMIDE HYDROCHLORIDE 5 MG/ML
10 INJECTION INTRAMUSCULAR; INTRAVENOUS ONCE
Status: COMPLETED | OUTPATIENT
Start: 2025-01-02 | End: 2025-01-02

## 2025-01-02 RX ORDER — MORPHINE SULFATE 4 MG/ML
4 INJECTION, SOLUTION INTRAMUSCULAR; INTRAVENOUS EVERY 10 MIN PRN
Status: DISCONTINUED | OUTPATIENT
Start: 2025-01-02 | End: 2025-01-02

## 2025-01-02 RX ORDER — DEXAMETHASONE SODIUM PHOSPHATE 4 MG/ML
VIAL (ML) INJECTION AS NEEDED
Status: DISCONTINUED | OUTPATIENT
Start: 2025-01-02 | End: 2025-01-02 | Stop reason: SURG

## 2025-01-02 RX ORDER — OXYCODONE HYDROCHLORIDE 5 MG/1
5 TABLET ORAL EVERY 4 HOURS PRN
Status: DISCONTINUED | OUTPATIENT
Start: 2025-01-02 | End: 2025-01-02

## 2025-01-02 RX ORDER — HYDROMORPHONE HYDROCHLORIDE 1 MG/ML
0.8 INJECTION, SOLUTION INTRAMUSCULAR; INTRAVENOUS; SUBCUTANEOUS EVERY 2 HOUR PRN
Status: DISCONTINUED | OUTPATIENT
Start: 2025-01-02 | End: 2025-01-02

## 2025-01-02 RX ORDER — ACETAMINOPHEN 500 MG
1000 TABLET ORAL ONCE
Status: COMPLETED | OUTPATIENT
Start: 2025-01-02 | End: 2025-01-02

## 2025-01-02 RX ORDER — ONDANSETRON 2 MG/ML
INJECTION INTRAMUSCULAR; INTRAVENOUS AS NEEDED
Status: DISCONTINUED | OUTPATIENT
Start: 2025-01-02 | End: 2025-01-02 | Stop reason: SURG

## 2025-01-02 RX ORDER — HYDROMORPHONE HYDROCHLORIDE 1 MG/ML
0.2 INJECTION, SOLUTION INTRAMUSCULAR; INTRAVENOUS; SUBCUTANEOUS EVERY 5 MIN PRN
Status: DISCONTINUED | OUTPATIENT
Start: 2025-01-02 | End: 2025-01-02

## 2025-01-02 RX ADMIN — SODIUM CHLORIDE, SODIUM LACTATE, POTASSIUM CHLORIDE, CALCIUM CHLORIDE: 600; 310; 30; 20 INJECTION, SOLUTION INTRAVENOUS at 09:26:00

## 2025-01-02 RX ADMIN — MIDAZOLAM HYDROCHLORIDE 2 MG: 1 INJECTION INTRAMUSCULAR; INTRAVENOUS at 09:15:00

## 2025-01-02 RX ADMIN — DEXAMETHASONE SODIUM PHOSPHATE 10 MG: 10 INJECTION, SOLUTION INTRAMUSCULAR; INTRAVENOUS at 09:15:00

## 2025-01-02 RX ADMIN — LIDOCAINE HYDROCHLORIDE 50 MG: 10 INJECTION, SOLUTION EPIDURAL; INFILTRATION; INTRACAUDAL; PERINEURAL at 09:32:00

## 2025-01-02 RX ADMIN — SODIUM CHLORIDE, SODIUM LACTATE, POTASSIUM CHLORIDE, CALCIUM CHLORIDE: 600; 310; 30; 20 INJECTION, SOLUTION INTRAVENOUS at 09:51:00

## 2025-01-02 RX ADMIN — GLYCOPYRROLATE 1 MG: 0.2 INJECTION, SOLUTION INTRAMUSCULAR; INTRAVENOUS at 10:44:00

## 2025-01-02 RX ADMIN — ONDANSETRON 4 MG: 2 INJECTION INTRAMUSCULAR; INTRAVENOUS at 10:35:00

## 2025-01-02 RX ADMIN — NEOSTIGMINE METHYLSULFATE 5 MG: 1 INJECTION INTRAVENOUS at 10:44:00

## 2025-01-02 RX ADMIN — ROCURONIUM BROMIDE 10 MG: 10 INJECTION, SOLUTION INTRAVENOUS at 09:32:00

## 2025-01-02 RX ADMIN — ROPIVACAINE HYDROCHLORIDE 25 ML: 5 INJECTION, SOLUTION EPIDURAL; INFILTRATION; PERINEURAL at 09:15:00

## 2025-01-02 RX ADMIN — DEXAMETHASONE SODIUM PHOSPHATE 8 MG: 4 MG/ML VIAL (ML) INJECTION at 10:15:00

## 2025-01-02 RX ADMIN — ROCURONIUM BROMIDE 40 MG: 10 INJECTION, SOLUTION INTRAVENOUS at 09:40:00

## 2025-01-02 NOTE — INTERVAL H&P NOTE
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  I discussed with the patient and/or legal representative the potential benefits, risks and side effects of this procedure; the likelihood of the patient achieving goals; and potential problems that might occur during recuperation.  I discussed reasonable alternatives to the procedure, including risks, benefits and side effects related to the alternatives and risks related to not receiving this procedure.  We will proceed with procedure as planned.

## 2025-01-02 NOTE — ANESTHESIA PROCEDURE NOTES
Peripheral Block    Date/Time: 1/2/2025 9:15 AM    Performed by: Shaquille Neil MD  Authorized by: Shaquille Neil MD      General Information and Staff    Start Time:  1/2/2025 9:15 AM  End Time:  1/2/2025 9:21 AM  Anesthesiologist:  Shaquille Neil MD  Performed by:  Anesthesiologist  Patient Location:  PACU    Block Placement: Pre Induction  Site Identification: real time ultrasound guided and image stored and retrievable    Block site/laterality marked before start: site marked  Reason for Block: at surgeon's request and post-op pain management    Preanesthetic Checklist: 2 patient identifers, IV checked, site marked, risks and benefits discussed, monitors and equipment checked, pre-op evaluation, timeout performed, anesthesia consent, sterile technique used, no prohibitive neurological deficits and no local skin infection at insertion site      Procedure Details    Patient Position:  Supine  Prep: ChloraPrep and patient draped    Monitoring:  Cardiac monitor  Block Type:  Popliteal  Laterality:  Right  Injection Technique:  Single-shot    Needle    Needle Type:  Short-bevel  Needle Gauge:  22 G  Needle Length:  90 mm  Needle Localization:  Ultrasound guidance  Reason for Ultrasound Use: appropriate spread of the medication was noted in real time and no ultrasound evidence of intravascular and/or intraneural injection            Assessment    Injection Assessment:  Good spread noted, incremental injection, local visualized surrounding nerve on ultrasound, low pressure, negative aspiration for heme, no pain on injection and negative resistance  Paresthesia Pain:  None  Heart Rate Change: No    - Patient tolerated block procedure well without evidence of immediate block related complications.     Medications  1/2/2025 9:15 AM  midazolam (VERSED)injection 2mg/2ml - Intravenous   2 mg - 1/2/2025 9:15:00 AM  fentaNYL (SUBLIMAZE) injection 50mcg/ml - Intravenous   50 mcg - 1/2/2025 9:15:00 AM  ropivacaine (NAROPIN)  injection 0.5% - Injection   25 mL - 1/2/2025 9:15:00 AM  dexamethasone (DECADRON) PF injection 10 mg/ml - Injection   10 mg - 1/2/2025 9:15:00 AM    Additional Comments

## 2025-01-02 NOTE — ANESTHESIA POSTPROCEDURE EVALUATION
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    [facility] AN Post Evaluation:   Patient Evaluated in PACU  Patient Participation: complete - patient participated  Level of Consciousness: awake  Pain Score: 0  Pain Management: adequate  Airway Patency:patent  Dental exam unchanged from preop  Yes    Nausea/Vomiting: none  Cardiovascular Status: acceptable  Respiratory Status: acceptable  Postoperative Hydration acceptable      Shaquille Neil MD  1/2/2025 10:53 AM

## 2025-01-02 NOTE — BRIEF OP NOTE
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

## 2025-01-02 NOTE — ANESTHESIA PREPROCEDURE EVALUATION
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]    Relevant Problems   No relevant active problems       NPO:  Last Liquid Consumption Date: 01/01/25  Last Liquid Consumption Time: 2030  Last Solid Consumption Date: 01/01/25  Last Solid Consumption Time: 2000  Last Liquid Consumption Date: 01/01/25          History Review:  Patient Active Problem List    Diagnosis Date Noted    Dyslipidemia 09/20/2024    Ankle instability, unspecified laterality 01/13/2023    Ganglion cyst of foot 01/13/2023    Arthritis, midfoot 01/13/2023    Neuritis 01/13/2023    Allergic rhinitis due to pollen 06/15/2016    Astigmatism 03/18/2016    Essential hypertension 12/09/2013       Past Medical History:    Biceps muscle tear    Blood in stool    Essential hypertension    Hemorrhoids    High blood pressure    High cholesterol    Physical exam, annual       Past Surgical History:   Procedure Laterality Date    Arthroscopy biceps tenodesis Left 2012    Colonoscopy N/A 03/04/2022    Procedure: COLONOSCOPY;  Surgeon: Juan Ramon Hugo MD;  Location: [facility] ENDO    Elbow surgery Right     bicep tendon repair    Shoulder surg proc unlisted Right 12/01/2009    Shoulder surg proc unlisted      left Shoulder        Prescriptions Prior to Admission[1]  Current Medications and Prescriptions Ordered in Epic[2]    Allergies[3]    Family History   Problem Relation Age of Onset    Hypertension Father     Skin cancer Father         skin    Other (Other) Father     Cancer Father         skin cancer on ear    Diabetes Neg     Glaucoma Neg      Social History     Socioeconomic History    Marital status:     Tobacco Use    Smoking status: Never    Smokeless tobacco: Never   Vaping Use    Vaping status: Never Used   Substance and Sexual Activity    Alcohol use: Not Currently     Comment: Socially    Drug use: No    Sexual activity: Yes       Available pre-op labs reviewed.             Vital Signs:  Body mass index is 33.09 kg/m².   height is 1.829 m (6') and weight is 110.7 kg (244 lb). His oral temperature is 97.5 °F (36.4 °C). His blood pressure is 132/79 and his pulse is 80. His respiration is 18 and oxygen saturation is 100%.   Vitals:    12/23/24 1324 01/02/25 0724   BP:  132/79   Pulse:  80   Resp:  18   Temp:  97.5 °F (36.4 °C)   TempSrc:  Oral   SpO2:  100%   Weight: 108 kg (238 lb) 110.7 kg (244 lb)   Height: 1.829 m (6') 1.829 m (6')        Anesthesia Evaluation     Patient summary reviewed and Nursing notes reviewed    No history of anesthetic complications   Airway   Mallampati: II  TM distance: >3 FB  Neck ROM: full  Dental - Dentition appears grossly intact     Pulmonary - negative ROS and normal exam   Cardiovascular - normal exam  Exercise tolerance: good  (+) hypertension    ROS comment: HL    Neuro/Psych      Comments: Occasional cannabis PO    GI/Hepatic/Renal - negative ROS     Endo/Other - negative ROS   Abdominal  - normal exam                 Anesthesia Plan:   ASA:  2  Plan:   General and regional  Airway:  ETT  Post-op Pain Management: Popliteal block, IV analgesics and Local  Informed Consent Plan and Risks Discussed With:  Patient  Use of Blood Products Discussed With:  Patient  Blood Product Use Consented        I have informed Sonny Baker and/or legal guardian or family member of the nature of the anesthetic plan, benefits, risks including possible dental damage if relevant, major complications, and any alternative forms of anesthetic management.   All of the patient's questions were answered to the best of my ability. The patient desires the anesthetic management as  planned.  Shaquille Neil MD  1/2/2025 7:59 AM  Present on Admission:  **None**           [1]   Medications Prior to Admission   Medication Sig Dispense Refill Last Dose/Taking    omega-3 fatty acids 1000 MG Oral Cap Take 2,000 mg by mouth daily.   Past Week    atorvastatin 10 MG Oral Tab Take 1 tablet (10 mg total) by mouth nightly. 90 tablet 3 1/1/2025 at  8:00 PM    lisinopril-hydroCHLOROthiazide 10-12.5 MG Oral Tab Take 1 tablet by mouth daily. 90 tablet 3 1/1/2025 at  8:00 AM    cholecalciferol 50 MCG (2000 UT) Oral Cap Take 1 capsule (2,000 Units total) by mouth daily.   1/1/2025 at  8:00 AM   [2]   Current Facility-Administered Medications Ordered in Epic   Medication Dose Route Frequency Provider Last Rate Last Admin    lactated ringers infusion   Intravenous Continuous Karis Beasley DO 20 mL/hr at 01/02/25 0745 New Bag at 01/02/25 0745    ceFAZolin (Ancef) 2g in 10mL IV syringe premix  2 g Intravenous Once Karis Beasley DO         No current [facility]-ordered outpatient medications on file.   [3]   Allergies  Allergen Reactions    Seasonal Runny nose

## 2025-01-02 NOTE — ANESTHESIA PROCEDURE NOTES
Airway  Date/Time: 1/2/2025 9:33 AM  Urgency: Elective      General Information and Staff    Patient location during procedure: OR  Anesthesiologist: Shaquille Neil MD  Performed: anesthesiologist   Performed by: Shaquille Neil MD  Authorized by: Shaquille Neil MD      Indications and Patient Condition  Indications for airway management: anesthesia  Sedation level: deep  Preoxygenated: yes  Patient position: sniffing  Mask difficulty assessment: 0 - not attempted    Final Airway Details  Final airway type: endotracheal airway      Successful airway: ETT  Cuffed: yes   Successful intubation technique: Video laryngoscopy  Endotracheal tube insertion site: oral  Blade: GlideScope  Blade size: #3  ETT size (mm): 7.5    Cormack-Lehane Classification: grade I - full view of glottis  Placement verified by: capnometry   Measured from: teeth  ETT to teeth (cm): 22  Number of attempts at approach: 1

## 2025-01-03 ENCOUNTER — TELEPHONE (OUTPATIENT)
Dept: ORTHOPEDICS CLINIC | Facility: CLINIC | Age: 50
End: 2025-01-03

## 2025-01-03 NOTE — OPERATIVE REPORT
[facility]    PATIENT'S NAME: ZAIRA HUDSON   ATTENDING PHYSICIAN: Karis Beasley DO   OPERATING PHYSICIAN: Karis Beasley DO   PATIENT ACCOUNT#:   [id number]    LOCATION:  [address]  MEDICAL RECORD #:   [id number]       YOB: 1975  ADMISSION DATE:       01/02/2025      OPERATION DATE:  01/02/2025    OPERATIVE REPORT    PREOPERATIVE DIAGNOSIS:    1.   Right insertional chronic Achilles tendon tear.  2.   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  3.   Equinus contracture.  POSTOPERATIVE DIAGNOSIS:    1.   Right insertional chronic Achilles tendon tear.  2.   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  3.   Equinus contracture.  4.   Achilles tendinosis.   PROCEDURE:    1.   Right insertional Achilles secondary tendon repair.   2.   Partial excision of calcaneus.  3.   Tenolysis of the Achilles tendon.  4.   Posterior compartment release.    ASSISTANT:  RANDA Cifuentes, who was essential in aiding in reduction, assisting in repair, in order to facilitate surgery and preserve the neurovascular bundle.     ANESTHESIA:  General, with a regional block.     HEMOSTASIS:  Pneumatic tourniquet inflated to 250 mmHg for approximately 53 minutes.    ESTIMATED BLOOD LOSS:  5 mL.    IMPLANTS:  Arthrex insertional Achilles tendon repair kit with All-Suture anchors proximally and distal PEEK anchors.    COMPLICATIONS:  None.    CONDITION:  Stable, to PACU.     INDICATIONS:  The patient is a 49-year-old male with a history of a painful insertional Achilles chronic tendon tear.  He failed conservative management including orthotics, bracing, anti-inflammatories, physical therapy, exercises, and activity modifications and wished to proceed with surgery.  All risks, benefits and alternatives were explained to the patient including, but not limited to, DVT, infection, wound-healing problems, bone-healing problems, tendon-healing  problems, neurovascular damage, risk of continued pain, and risk for need for further surgery.  The patient understood and wished to proceed with the above procedure.      OPERATIVE TECHNIQUE:  The patient was met in the preoperative holding area and marked with an indelible marker.  He was brought back to the operative suite, placed on the operating table in supine position.  There, anesthesia was administered by department of anesthesia.  Once he was adequately sedated, he was placed prone with all bony prominences well padded.  A well-padded right thigh tourniquet was placed, and he was prepped and draped in the usual sterile fashion.  A time-out was performed.  All in the room were in agreement with patient, procedure, and site of procedure.  Preoperative antibiotics were administered by the department of anesthesia prior to incision.  The limb was exsanguinated and tourniquet inflated to 250 mmHg and would remain inflated for approximately 53 minutes.    RIGHT SECONDARY ACHILLES TENDON REPAIR:  Through a posterior incision, dissection was carried down and identified the Achilles tendon tear.  The tendon was split with a scalpel and subperiosteally elevated.  Using a curved osteotome, the posterior calcaneus was removed along the posterior aspect of the calcaneus as well as a Diane deformity.  The osteotome was then used to remove part of the medial and lateral aspects of the calcaneus as well.  All bony prominences were smoothed out with a rasp.  A scalpel was used to remove scar tissue within the tendon, and the tendon was repaired with an 0 Vicryl suture.  Next, the suture tape was placed through the tendon, and our proximal row anchors were drilled and inserted under standard technique.  Suture tape was placed proximally in a Krackow fashion to secure the tendon.  Next, the distal row was drilled, tapped and inserted under standard technique.  Our compression suture stitch was activated and compressed the  tendon down to the bone.  The suture was then crossed over and inserted into the medial and lateral aspects of the anchors and secured into the bone.  There was excellent compression of the tendon and repair down to the bone.      PARTIAL EXCISION OF CALCANEUS:  Through a posterior incision, dissection was carried down, and the inferior, superior, medial and lateral aspects of the calcaneus were removed with an osteotome and rongeur.  All excess bone was removed with a rongeur and bony prominences smoothed out with a rasp.  The wound was irrigated to remove excess bone.    POSTERIOR COMPARTMENT RELEASE:  Through a posterior incision, dissection was carried down deep to the posterior fascia.  This had significant tightness present.  This was released with a scalpel distally, and care was taken to protect our neurovascular bundle.  There was improved tension on the tendon.     ACHILLES TENOLYSIS:  Proximally, the incision was carried down to the Achilles tendon which had significant adhesions around the tendon and scar tissue.  A scalpel was used to release adhesions proximally and rongeur used to remove any scar tissue and bursitis around the tendon.  The tendon was then found to be gliding freely.      Our wounds were irrigated thoroughly and incisions closed using 0 Vicryl suture for the paratenon, 2-0 Vicryl for the subcutaneous tissue, and a running 3-0 Monocryl for subcuticular closure.  A sterile dressing was placed consisting of Silverlon, 4 x 4, ABD, Webril, and a bulky Peña sugar-tong splint.  The tourniquet had been deflated, and the toes pinked up nicely.  The patient was awoken from anesthesia and brought to the PACU in stable condition.  He will be nonweightbearing through his right lower extremity, receive DVT prophylaxis, and follow up in the office in 1 week.     Dictated By Karis Beasley DO  d: 01/02/2025 10:49:29  t: 01/02/2025 19:29:22  Job [id number]  [facility]/

## 2025-01-04 NOTE — TELEPHONE ENCOUNTER
Post-op call for Dr. Beasley    Date: 1/3/2025      Time: 6:40 PM   Patient: Sonny Baker      number: [id number]   Surgery and surgery date:1-2-2025  Procedure Laterality Anesthesia   Right achilles secondary tendon repair, tenolysis achilles tendon, posterior compartment release, partial excision calcaneus Right General     Patient unavailable.  Left message on voicemail to call clinic with questions or concerns.  Number was provided./ SPOKE TO PATIENT  Patient's general feeling since discharge:NOT TOO BAD  Pain control (0-10):/1-2  Pain Medication:  dose/strength/  Medication Quantity Refills Start End   HYDROcodone-acetaminophen 5-325 MG Oral Tab 24 tablet 0 1/2/2025 --   Sig:   Take        Medication Quantity Refills Start End   aspirin 81 MG Oral Tab EC         Medication Quantity Refills Start End   ibuprofen 600 MG Oral Tab         Fever:  no  Chills:  no  SOB:  no  Incision site appearance:  Redness  no  Drainage no  Clean/dry/Intact yes  Calf pain, redness or warmth:  no   Bowel Regimen: Yes today  If not, what are you taking stool softener/laxative?  Confirmed appointment date for post op:/1-8-2025  Other concerns patients may ask: toes and top of foot tingling,  Using advil for pain. Appetite good  Bathing and bandages   Patient needs to keep incision clean and dry for the first week./DONE  Enforce rest, ice, compression elevation and use their pain medication accordingly.DONE    We went over ice protocol   If the patient needs more pain medication, please put in a communication.

## 2025-01-08 ENCOUNTER — OFFICE VISIT (OUTPATIENT)
Dept: ORTHOPEDICS CLINIC | Facility: CLINIC | Age: 50
End: 2025-01-08
Payer: COMMERCIAL

## 2025-01-08 DIAGNOSIS — Z47.89 ORTHOPEDIC AFTERCARE: Primary | ICD-10-CM

## 2025-01-08 DIAGNOSIS — M92.61 HAGLUND DEFORMITY OF RIGHT HEEL: ICD-10-CM

## 2025-01-08 DIAGNOSIS — M76.61 ACHILLES TENDINITIS OF RIGHT LOWER EXTREMITY: ICD-10-CM

## 2025-01-08 PROCEDURE — 99024 POSTOP FOLLOW-UP VISIT: CPT | Performed by: ORTHOPAEDIC SURGERY

## 2025-01-13 NOTE — PROGRESS NOTES
Chief Complaint   Patient presents with    Post-Op     Right foot sx done on 1/2. Patient rates his pain 3/10 at this time. Patient complains of some numbness and tingling at times.      HPI  Patient is doing well postoperatively.  His pain is controlled.    Physical Exam  Gen: NAD, alert, answering questions appropriately  HEENT: NCAT, EOMI  Lungs: NL breathing, symmetrical lung expansion  Abd: Soft, ND  Extremities:   Right ankle with neutral alignment, incision clean, dry, intact.  Moderate hindfoot swelling that is appropriate postoperatively.  Calf soft nontender.  No erythema or fluctuance of the skin.  DF/PF/EHL intact, SILT, cap refill brisk      Assessment/Plan: 49 year old year old male presenting status post right insertional Achilles tendon repair, partial excision calcaneus on 1/2/2025    1. Achilles tendinitis of right lower extremity  Patient will remain nonweightbearing on the right lower extremity x 6 weeks.  He would benefit from a tall cam boot with a heel lift.  He should elevate his leg and leave his heel free at all times when elevating.  He does not need to sleep in the boot and should use it only when he is up and ambulating for protection.  Otherwise he can keep his boot and leave his incision open to air.  He may shower and get the incision wet now.  - DME - External    2. Diane deformity of right heel       3. Orthopedic aftercare  Patient should follow-up in 5 weeks to assess his progress.  We will plan on beginning weightbearing at that time.         Karis Beasley, DO  01/13/25

## 2025-02-12 ENCOUNTER — OFFICE VISIT (OUTPATIENT)
Dept: PHYSICAL THERAPY | Age: 50
End: 2025-02-12
Attending: ORTHOPAEDIC SURGERY
Payer: COMMERCIAL

## 2025-02-12 ENCOUNTER — OFFICE VISIT (OUTPATIENT)
Dept: ORTHOPEDICS CLINIC | Facility: CLINIC | Age: 50
End: 2025-02-12
Payer: COMMERCIAL

## 2025-02-12 ENCOUNTER — TELEPHONE (OUTPATIENT)
Dept: PHYSICAL THERAPY | Facility: HOSPITAL | Age: 50
End: 2025-02-12

## 2025-02-12 DIAGNOSIS — M67.873 ACHILLES TENDINOSIS OF RIGHT ANKLE: Primary | ICD-10-CM

## 2025-02-12 PROCEDURE — 99024 POSTOP FOLLOW-UP VISIT: CPT | Performed by: ORTHOPAEDIC SURGERY

## 2025-02-12 PROCEDURE — 97110 THERAPEUTIC EXERCISES: CPT | Performed by: PHYSICAL THERAPIST

## 2025-02-12 PROCEDURE — 97162 PT EVAL MOD COMPLEX 30 MIN: CPT | Performed by: PHYSICAL THERAPIST

## 2025-02-12 NOTE — PROGRESS NOTES
Chief Complaint   Patient presents with    Follow - Up     Right achilles sx done on 1/2. Patient denies any pain at this time. Patient states that he sometimes notices that his right foot get cold. Denies any numbness or tingling.      HPI  Sonny is doing well postoperatively.  His pain is controlled.  He does have some swelling    Physical Exam  Gen: NAD, alert, answering questions appropriately  HEENT: NCAT, EOMI  Lungs: NL breathing, symmetrical lung expansion  Abd: Soft, ND  Extremities:   R achilles incisions CDI, healing well, superficial scabbing present  Ankle with neutral alignment, DF/PF/EHL intact, SILT, cap refill brisk      Assessment/Plan: 49 year old year old male presenting 6 weeks post-op insertional Achilles tendon repair on 1/2/2025.  1. Achilles tendinosis of right ankle  Patient may begin weightbearing as tolerated with heel lifts in a boot for 2 weeks.  He then may begin to remove 1 heel lift per week and continue to ambulate in the boot.  When he is done with removing heel lifts he will weight-bear as tolerated in the boot for 1 week and then start transitioning to gym shoes.  - Physical Therapy Referral - [facility]     He plans on going to [facility] beginning of April with his son and will likely be able to do so if his healing continues as expected.    Return in about 5 weeks (around 3/19/2025).      Karis Beasley, DO  02/12/25

## 2025-02-13 NOTE — PROGRESS NOTES
LOWER EXTREMITY EVALUATION:     Diagnosis:   Achilles tendinosis of right ankle (M67.873) Patient:  Sonny Baker (49 year old, male)        Referring Provider: Karis Beasley  Today's Date   2/13/2025    Precautions:  Other (use comment) ((R) LE WBAT (using walking boot))   Date of Evaluation: 02/12/25  Next MD visit: 3/19/2025  Date of Surgery: 1/2/2025     PATIENT SUMMARY   Summary of chief complaints: Stiff/ache in the (R) heel/ankle rated 0-2/10.  History of current condition: Rich report having pain in the (R) heel since summer of 2024.  He had (R) heel surgery to remove a bone spur and shave the heel on 1/2/2025.  He has been wearing a walking boot on a scooter due to being NWB up until today, He is WBAT on the (R) LE with a walking boot.   Pain level: current 0 /10, at best 0 /10, at worst 2 /10  Description of symptoms: Stiff/ache in the (R) heel/ankle rated 0-2/10.   Occupation: Sit using a computer.   Leisure activities/Hobbies: Run, basketball, bike riding, walk, outdoor activities, activities with son.   Prior level of function: No symptoms with all home, work, and recreational activities.  Current limitations: Inability to place full weight, walk, climb up/down stairs, run, and drive without producing symptoms in the (R) heel/foot.  Pt goals: Returnt to all normal home, work, and recreational activities without (R) heel/foot symptoms.  Past medical history was reviewed with Sonny.  Significant findings include: NA  Imaging/Tests: x-ray (healing good)   Sonny  has a past medical history of Biceps muscle tear (05/01/2012), Blood in stool (05/18/2021), Essential hypertension (12/09/2013), Hemorrhoids (05/2021), High blood pressure, High cholesterol, and Physical exam, annual (11/05/2015).  He  has a past surgical history that includes shoulder surg proc unlisted (Right, 12/01/2009); arthroscopy biceps tenodesis (Left, 2012); colonoscopy (N/A, 03/04/2022); shoulder surg proc unlisted; and elbow  surgery (Right).    ASSESSMENT  Sonny presents to physical therapy evaluation with primary c/o Stiff/ache in the (R) heel/ankle rated 0-2/10.. The results of the objective tests and measures show Vinay is presenting with an impaired (R) Ankle/Foot ROM, mobility, strength, stability, gait ability, balance, and proprioception due to recent and healing surgical procedure.. Functional deficits include but are not limited to Inability to place full weight, walk, climb up/down stairs, run, and drive without producing symptoms in the (R) heel/foot.. Signs and symptoms are consistent with diagnosis of Achilles tendinosis of right ankle (M67.873). Pt and PT discussed evaluation findings, pathology, POC and HEP.  Pt voiced understanding and performs HEP correctly without reported pain. Skilled Physical Therapy is medically necessary to address the above impairments and reach functional goals.    OBJECTIVE:   Musculoskeletal  Observation: (R) LE walking boot WBAT; Intact incision site with sutures.  (-) Redness  Palpation: (+) (R) heel scar tissue tightness     Edema: Malloelar level: (R) 30.5 cm;  (L) 30 cm     (*) Stiffness/Tightness/ache  Ankle/Foot   ROM MMT (-/5)    R L R L     PF 50 degs 55 degs 3+/5 5/5     DF (L4) -18 degs * 8 degs 3+/5 * 5/5     Inversion 37 degs 50 degs 3+/5 5/5     Eversion 8 degs * 20 degs 3+/5 * 5/5     Grt Toe Ext (L5)     -- (Hallux Plantar flexion MMT: 3-/5) -- (Hallux Plantar flexion MMT: 4/5)     Neurological:  Sensation: (-) Numbness/Tingling; Decreased sensation (R) heel     Balance and Functional Mobility:  Gait: pt ambulates on level ground with assistive device; decreased step length; decreased stance phase; decreased foot clearance; stooped posture/forward lean; other (use comment) (Antalgic gait with (R) walking boot)     Today's Treatment and Response:   Pt education was provided on exam findings, treatment diagnosis, treatment plan, expectations, and prognosis.  Today's Treatment        2/12/2025   PT Treatment   Therapeutic Exercise Seated: (R) Ankle DF light stretch with towel 10 reps x 10 cts each     Seated: (R) Ankle resisted PF, DF, EV, IV with yellowTB x 20 reps    Seated: (R) Hallux resisted PF with yellowTB x 20 reps    Seated: (R) foot on floor on towel: scrunches x 2 mins    Therapeutic Exercise Min 15   Evaluation Min 30   Total of Timed Procedures 15   Total of Service Based 30   Total Treatment Time 45         Patient was instructed in and issued a HEP for: Seated: (R) Ankle resisted PF, DF, EV, IV with yellowTB x 20 reps    Seated: (R) Hallux resisted PF with yellowTB x 20 reps    Seated: (R) foot on floor on towel: scrunches x 2 mins     Charges:  PT EVAL: Moderate Complexity, TherEx x 1  In agreement with evaluation findings and clinical rationale, this evaluation involved MODERATE COMPLEXITY decision making due to 1-2 personal factors/comorbidities, 3 or more body structures involved/activity limitations, and evolving symptoms as documented in the evaluation.                                                                                                                PLAN OF CARE:    Goals: (to be met in 12 visits)   Goals       Therapy Goals     1. Patient to consistently perform their HEP and it's progression to maintain their improved condition.  2. Patient to have reduced and abolished symptoms to to be able to place full weight, walk, climb up/down stairs, run, and drive without producing symptoms in the (R) heel/foot.   3. Patient to have WNL of (R) Ankle/Foot AROM in all directions with 4-5/5 MMT in all motions to promote all home, work, recreational, and functional activities without discomfort or deviation.               Frequency / Duration: Patient will be seen 2-1x/week or a total of 12  visits over a 90 day period. Treatment will include: Gait training; Manual Therapy; Therapeutic Activities; Therapeutic Exercise; Home Exercise Program instruction; Other (use  comment) (Stretching, mobilization, balance/proprioceptive exercises, patient education, and HEP progression.)    Education or treatment limitation: None   Rehab Potential: good     LEFS Score  LEFS Score: (Patient-Rptd) 36.25 % (2/12/2025  2:57 PM)      Patient/Family/Caregiver was advised of these findings, precautions, and treatment options and has agreed to actively participate in planning and for this course of care.    Thank you for your referral. Please co-sign or sign and return this letter via fax as soon as possible to [phone]. If you have any questions, please contact me at Dept: [phone]    Sincerely,  Electronically signed by therapist: Koko Medley, PT, Cert MDT  Physician's certification required: Yes  I certify the need for these services furnished under this plan of treatment and while under my care.    X___________________________________________________ Date____________________    Certification From: 2/13/2025  To:5/14/2025

## 2025-02-14 ENCOUNTER — OFFICE VISIT (OUTPATIENT)
Dept: PHYSICAL THERAPY | Age: 50
End: 2025-02-14
Attending: ORTHOPAEDIC SURGERY
Payer: COMMERCIAL

## 2025-02-14 PROCEDURE — 97110 THERAPEUTIC EXERCISES: CPT

## 2025-02-14 NOTE — PROGRESS NOTES
Patient: Sonny Baker (49 year old, male) Referring Provider:  Insurance:   Diagnosis: Achilles tendinosis of right ankle (M67.873) Karis GIANG   Date of Surgery: 1/2/2025 Next MD visit:  N/A   Precautions:  Other (use comment) ((R) LE WBAT (using walking boot)) 3/19/2025 Referral Information:    Date of Evaluation: Req: 0, Auth: 0, Exp:     02/12/25 POC Auth Visits:  10       Today's Date   2/14/2025    Subjective  Vinay stated that after his initial visit he felt \"good\" and does not report any exacerbation of pain.  Vinay notes a \"tightness\" in the bottom of his (R) foot with the first steps out of bed in the AM.  He is using a 3/4 inch heel raise with the boot he wears when ambulating in the home/community.  He reported doing his HEP 2-3x/day.       Pain: 0/10     Objective  Gait: Pt ambulates without pain, on level ground with assistive device; decreased step length; decreased stance phase; decreased foot clearance.            Assessment  Vinay continues to demonstrate poor gait mechanics and was instrcuted to focus on making symmetical steps (smaller if necessary) while landing on the heel of his (R) foot/boot to promote good gait pattern.  He did not experience any signifcant onset of pain during his session and was progressed to using a red TB with his strengthening exercises.  He was provided a red TB and told to continue with his HEP 2-3x/day to improve his condition.    Goals (to be met in 12 visits)   Goals       Therapy Goals     1. Patient to consistently perform their HEP and it's progression to maintain their improved condition.  2. Patient to have reduced and abolished symptoms to to be able to place full weight, walk, climb up/down stairs, run, and drive without producing symptoms in the (R) heel/foot.   3. Patient to have WNL of (R) Ankle/Foot AROM in all directions with 4-5/5 MMT in all motions to promote all home, work, recreational, and functional activities without discomfort or  deviation.               Plan  Plan to re-assess next session: Treatment will include: Gait training; Therapeutic Activities; Therapeutic Exercise; Home Exercise Program instruction; Other (use comment) (Stretching, mobilization, balance/proprioceptive exercises, patient education, and HEP progression.)    Treatment Last 4 Visits       2025   PT Treatment   Treatment Day  2   Therapeutic Exercise Seated: (R) Ankle DF light stretch with towel 10 reps x 10 cts each     Seated: (R) Ankle resisted PF, DF, EV, IV with yellowTB x 20 reps    Seated: (R) Hallux resisted PF with yellowTB x 20 reps    Seated: (R) foot on floor on towel: scrunches x 2 mins  NuStep for LE with UE assist, Lv 0 x 10 min; NE (stretch felt)    Seated: (R) Ankle DF light stretch with towel 10 reps x 10 cts each; NE    Seated: (R) Ankle resisted PF, DF, EV, IV with yellow-red TB x 20 reps; NE (stretch in back of heel felt during DF/PF)    Seated: (R) Hallux resisted PF with yellow-red TB x 20 reps; NE    BAPs Board Lv 1: (R) foot Fwd/Qsjv-GahkjgOtim-Bi/Ccw x 10-20 reps ea; NE (stretch)    Seated: (R) foot on floor on towel: scrunches and windshield wipers x 20 reps each; NE    Seated: (R) heel slides over towel to stretch achilles lightly. 10 reps x 5 cts hold; NE (felt good)    Seated: (R) Foot roll over bar x 30 reps; NE (felt really good)    Gait Trainin laps x 20 ft; NE (cued to promote symmetrical step with heel to toe progression).   Therapeutic Exercise Min 15 45   Evaluation Min 30    Total of Timed Procedures 15 45   Total of Service Based 30 0   Total Treatment Time 45 45         HEP  Seated: (R) Ankle resisted PF, DF, EV, IV with RedTB x 20 reps; 2x/day    Seated: (R) Hallux resisted PF with RedTB x 20 reps; 2x/day    Seated: (R) foot on floor on towel: scrunches and wipers x 20 reps ea; 2x/day    Charges     Therapy Ex x 3     On this date patient was seen by Jose Castro PTA under the supervision of Koko Medley, PT.  Emory LUNA

## 2025-02-17 ENCOUNTER — OFFICE VISIT (OUTPATIENT)
Dept: PHYSICAL THERAPY | Age: 50
End: 2025-02-17
Attending: ORTHOPAEDIC SURGERY
Payer: COMMERCIAL

## 2025-02-17 PROCEDURE — 97116 GAIT TRAINING THERAPY: CPT

## 2025-02-17 PROCEDURE — 97110 THERAPEUTIC EXERCISES: CPT

## 2025-02-17 NOTE — PROGRESS NOTES
Patient: Sonny Baker (49 year old, male) Referring Provider:  Insurance:   Diagnosis: Achilles tendinosis of right ankle (M67.873) Karis GIANG   Date of Surgery: 1/2/2025 Next MD visit:  N/A   Precautions:  Other (use comment) ((R) LE WBAT (using walking boot)) 3/19/2025 Referral Information:    Date of Evaluation: Req: 0, Auth: 0, Exp:     02/12/25 POC Auth Visits:  10       Today's Date   2/17/2025    Subjective  Vinay continues to feel better each day in the (R) achillies tendon.  He reports being symptom free at rest and while ambulating with his boot.  He continues to use a 3/4 inch heel raise.  He does not take any Rx for pain and cites sleeping well throughout the night.  He says that he has been doing his HEP 2x/day at least.       Pain: 0/10     Objective  Gait: Pt ambulates without pain, on level ground with boot; improved step length; improved stance phase on (R) LE; improved foot clearance.           Assessment  Vinay began today's session without any symptoms in the (R) foot, he did ambulate in the clinic with his boot and demonstrated improved gait mechanics with increased duration on (R) LE during stance phase of gait.  He continues to gain AROM into DF of the (R) ankle.  Today's session he was intrduced to WBAT without the boot via weight shifting.  He was progressed to using a green TB with his strengthening exercises.  He did not experience any signifcant onset of pain during his session.  He was provided a Green TB and told to continue with his HEP 2-3x/day to improve his condition.    Goals (to be met in 12 visits)   Goals       Therapy Goals     1. Patient to consistently perform their HEP and it's progression to maintain their improved condition.  2. Patient to have reduced and abolished symptoms to to be able to place full weight, walk, climb up/down stairs, run, and drive without producing symptoms in the (R) heel/foot.   3. Patient to have WNL of (R) Ankle/Foot AROM in all  directions with 4-5/5 MMT in all motions to promote all home, work, recreational, and functional activities without discomfort or deviation.               Plan  Plan to re-assess next session: Treatment will include: Gait training; Therapeutic Activities; Therapeutic Exercise; Home Exercise Program instruction; Other (use comment) (Stretching, mobilization, balance/proprioceptive exercises, patient education, and HEP progression.)    Treatment Last 4 Visits       2025   PT Treatment   Treatment Day  2 3   Therapeutic Exercise Seated: (R) Ankle DF light stretch with towel 10 reps x 10 cts each     Seated: (R) Ankle resisted PF, DF, EV, IV with yellowTB x 20 reps    Seated: (R) Hallux resisted PF with yellowTB x 20 reps    Seated: (R) foot on floor on towel: scrunches x 2 mins  NuStep for LE with UE assist, Lv 0 x 10 min; NE (stretch felt)    Seated: (R) Ankle DF light stretch with towel 10 reps x 10 cts each; NE    Seated: (R) Ankle resisted PF, DF, EV, IV with yellow-red TB x 20 reps; NE (stretch in back of heel felt during DF/PF)    Seated: (R) Hallux resisted PF with yellow-red TB x 20 reps; NE    BAPs Board Lv 1: (R) foot Fwd/Xczd-GljraqWshi-Sh/Ccw x 10-20 reps ea; NE (stretch)    Seated: (R) foot on floor on towel: scrunches and windshield wipers x 20 reps each; NE    Seated: (R) heel slides over towel to stretch achilles lightly. 10 reps x 5 cts hold; NE (felt good)    Seated: (R) Foot roll over bar x 30 reps; NE (felt really good)    Gait Trainin laps x 20 ft; NE (cued to promote symmetrical step with heel to toe progression). NuStep for LE, with UE assist, Lv 1 x 10 min; NE (stretch felt)     Gait Training with boot: 2 laps x 30 ft; NE    Weight shifting side to side / front and back b/w walker WBAT onto (R) foot 2 x 10 reps ea direction; NE (light stretch/pressure)    Seated: (R) Ankle DF light-moderate stretch with towel 10 reps x 10 cts each; NE     Seated: (R) Ankle resisted  PF, DF, EV, IV with green TB x 20 reps; NE    Seated: (R) Hallux resisted PF with green TB x 20 reps; NE     BAPs Board Lv 1: (R) foot Fwd/Ystc-PfagsgArcx-Zz/Ccw x 20 reps ea; NE (stretch)     Seated: (R) foot [facility] floor on [facility] x 20 reps each; NE     Seated: (R) heel slides over towel to stretch achilles lightly. 10 reps x 5 cts hold; NE (felt good)     (B) Shuttle Leg Press 2b. (Weight bearing L>R) 2 sets x 10 reps; NE       Therapeutic Exercise Min 15 45 38   Neuro Re-Ed Min   0   Gait Training Min   10   Evaluation Min 30     Total of Timed Procedures 15 45 48   Total of Service Based 30 0 0   Total Treatment Time 45 45 48         HEP  2-3x/day:  - Seated: (R) Ankle DF light-moderate stretch with towel 10 reps x 10 cts ea  - Seated: (R) Ankle resisted PF, DF, EV, IV with Green TB x 20 reps  - Seated: (R) Hallux resisted PF with Green TB x 20 reps; 2x/day  - Seated: (R) foot on floor on towel: scrunches and wipers x 20 reps ea    Charges     Therapy Ex x 3, Gait x 1     On this date patient was seen by Jose Castro PTA under the supervision of Koko Medley PT. Cert MDT.                             SSKI Counseling:  I discussed with the patient the risks of SSKI including but not limited to thyroid abnormalities, metallic taste, GI upset, fever, headache, acne, arthralgias, paraesthesias, lymphadenopathy, easy bleeding, arrhythmias, and allergic reaction.

## 2025-02-20 ENCOUNTER — TELEPHONE (OUTPATIENT)
Dept: ORTHOPEDICS CLINIC | Facility: CLINIC | Age: 50
End: 2025-02-20

## 2025-02-21 ENCOUNTER — OFFICE VISIT (OUTPATIENT)
Dept: PHYSICAL THERAPY | Age: 50
End: 2025-02-21
Attending: ORTHOPAEDIC SURGERY
Payer: COMMERCIAL

## 2025-02-21 PROCEDURE — 97110 THERAPEUTIC EXERCISES: CPT | Performed by: PHYSICAL THERAPIST

## 2025-02-21 NOTE — PROGRESS NOTES
Patient: Sonny Baker (49 year old, male) Referring Provider:  Insurance:   Diagnosis: Achilles tendinosis of right ankle (M67.873) Karis GIANG   Date of Surgery: 1/2/2025 Next MD visit:  N/A   Precautions:  Other (use comment) ((R) LE WBAT (using walking boot)) 3/19/2025 Referral Information:    Date of Evaluation: Req: 0, Auth: 0, Exp:     02/12/25 POC Auth Visits:  10       Today's Date   2/21/2025    Subjective  Vinay report only stiffness in the (R) heel area.  He feels an ache in the same area when walking with the walking boot rated 0-1/10 but goes away when not placing weight on it.  He is down to 2 heel risers this moring. He has doing his HEP regularly.       Pain: 0/10     Objective  Gait: Pt ambulates without pain, on level ground with boot; improved step length; improved stance phase on (R) LE; improved foot clearance.       Assessment  Vinay is progressing with (R) A[place]ley/foot stretching toward DF with (R) foot on a chair (partial weight bearing). He was instructed on doing an eccentric loading of the (R) calf muscle.  No symptom reported during exercises.    Goals (to be met in 12 visits)   Goals       Therapy Goals     1. Patient to consistently perform their HEP and it's progression to maintain their improved condition.  2. Patient to have reduced and abolished symptoms to to be able to place full weight, walk, climb up/down stairs, run, and drive without producing symptoms in the (R) heel/foot.   3. Patient to have WNL of (R) Ankle/Foot AROM in all directions with 4-5/5 MMT in all motions to promote all home, work, recreational, and functional activities without discomfort or deviation.               Plan  Plan to re-assess next session: Treatment will include: Gait training; Therapeutic Activities; Therapeutic Exercise; Home Exercise Program instruction; Other (use comment) (Stretching, mobilization, balance/proprioceptive exercises, patient education, and HEP  progression.)    Treatment Last 4 Visits       2025   PT Treatment   Treatment Day  2 3 4   Therapeutic Exercise Seated: (R) Ankle DF light stretch with towel 10 reps x 10 cts each     Seated: (R) Ankle resisted PF, DF, EV, IV with yellowTB x 20 reps    Seated: (R) Hallux resisted PF with yellowTB x 20 reps    Seated: (R) foot on floor on towel: scrunches x 2 mins  NuStep for LE with UE assist, Lv 0 x 10 min; NE (stretch felt)    Seated: (R) Ankle DF light stretch with towel 10 reps x 10 cts each; NE    Seated: (R) Ankle resisted PF, DF, EV, IV with yellow-red TB x 20 reps; NE (stretch in back of heel felt during DF/PF)    Seated: (R) Hallux resisted PF with yellow-red TB x 20 reps; NE    BAPs Board Lv 1: (R) foot Fwd/Igsi-IgxywhLzyb-Qk/Ccw x 10-20 reps ea; NE (stretch)    Seated: (R) foot on floor on towel: scrunches and windshield wipers x 20 reps each; NE    Seated: (R) heel slides over towel to stretch achilles lightly. 10 reps x 5 cts hold; NE (felt good)    Seated: (R) Foot roll over bar x 30 reps; NE (felt really good)    Gait Trainin laps x 20 ft; NE (cued to promote symmetrical step with heel to toe progression). NuStep for LE, with UE assist, Lv 1 x 10 min; NE (stretch felt)     Gait Training with boot: 2 laps x 30 ft; NE    Weight shifting side to side / front and back b/w walker WBAT onto (R) foot 2 x 10 reps ea direction; NE (light stretch/pressure)    Seated: (R) Ankle DF light-moderate stretch with towel 10 reps x 10 cts each; NE     Seated: (R) Ankle resisted PF, DF, EV, IV with green TB x 20 reps; NE    Seated: (R) Hallux resisted PF with green TB x 20 reps; NE     BAPs Board Lv 1: (R) foot Fwd/Snde-XdutwbCzse-Cp/Ccw x 20 reps ea; NE (stretch)     Seated: (R) foot on floor on towel windshield wipers x 20 reps each; NE     Seated: (R) heel slides over towel to stretch achilles lightly. 10 reps x 5 cts hold; NE (felt good)     (B) Shuttle Leg Press 2b. (Weight  bearing L>R) 2 sets x 10 reps; NE     NuStep LE only L1 x 10 mins; P, NW stretch    Seated: (R) Ankle DF stretch with towel OP x 10 reps; NE    Standing: (R) foot on chair DF stretch 2 x 10 reps; P, NW (better)    Seated: (R) Ankle/Foot DF/PF/EV/IV with greenTB x 20 reps; NE    + eccentric DF with greenTB 10 reps x 5 eccen ct ea; NE tired    Seated: (R) Hallux PF with greenTB 20 reps; NE    Seated: (R) Ankle/Foot BAPS L3 PF-DF/ EV-IV x 20 reps; NE tight    Standing // bars: (R) Ankle/Foot BAPS LE CW/CCW x 20 reps; NE tight    Standing // bars: weight shift (R) LE on floor flat side to side and fwd x 10 reps; NE tight   Therapeutic Exercise Min 15 45 38 46   Neuro Re-Ed Min   0    Gait Training Min   10    Evaluation Min 30      Total of Timed Procedures 15 45 48 46   Total of Service Based 30 0 0 0   Total Treatment Time 45 45 48 48         HEP  Standing: (R) foot on chair DF stretch/mob x 10-20 reps 2-3x/day    Charges     TherEx x 3

## 2025-02-24 ENCOUNTER — OFFICE VISIT (OUTPATIENT)
Dept: PHYSICAL THERAPY | Age: 50
End: 2025-02-24
Attending: ORTHOPAEDIC SURGERY
Payer: COMMERCIAL

## 2025-02-24 PROCEDURE — 97110 THERAPEUTIC EXERCISES: CPT

## 2025-02-24 NOTE — PROGRESS NOTES
Patient: Sonny Baker (49 year old, male) Referring Provider:  Insurance:   Diagnosis: Achilles tendinosis of right ankle (M67.873) Karis Beasley  JACMAZIN   Date of Surgery: 1/2/2025 Next MD visit:  N/A   Precautions:  Other (use comment) ((R) LE WBAT (using walking boot)) 3/19/2025 Referral Information:    Date of Evaluation: Req: 0, Auth: 0, Exp:     02/12/25 POC Auth Visits:  12       Today's Date   2/24/2025    Subjective  Vinay cites being symptom free in the (R) heel at this time.  He says that the only time he will experience symptoms are after prolonged standing/ambulating described as a \"dull ache\" that goes away with rest.  He continues to use his boot with 2 heel risers and plans to remove a few in the next couple of days.  He reports still doing his HEP 2x/day.       Pain: 0/10     Objective  Gait: Pt ambulates without pain, on level ground with boot; improved step length; improved stance phase on (R) LE; improved foot clearance.            Assessment  Vinay is demonstrating improvements in WB of the (R) foot without any onset of symptoms.  On this date he was progressed in resistance with his ankle strengthening exercises and was able to perform single leg shuttle leg press with light resistance.  He was instrcuted to continue with his current HEP using the provided Blue TB.  He verbalized understanding/agreement and all questions answered at this time.    Goals (to be met in 12 visits)   Goals                Today    Therapy Goals   On track    1. Patient to consistently perform their HEP and it's progression to maintain their improved condition.  2. Patient to have reduced and abolished symptoms to to be able to place full weight, walk, climb up/down stairs, run, and drive without producing symptoms in the (R) heel/foot.   3. Patient to have WNL of (R) Ankle/Foot AROM in all directions with 4-5/5 MMT in all motions to promote all home, work, recreational, and functional activities without  discomfort or deviation.               Plan  Plan to re-assess next session: Treatment will include: Gait training; Therapeutic Activities; Therapeutic Exercise; Home Exercise Program instruction; Other (use comment) (Stretching, mobilization, balance/proprioceptive exercises, patient education, and HEP progression.)    Treatment Last 4 Visits       2025   PT Treatment   Treatment Day 2 3 4 5   Therapeutic Exercise NuStep for LE with UE assist, Lv 0 x 10 min; NE (stretch felt)    Seated: (R) Ankle DF light stretch with towel 10 reps x 10 cts each; NE    Seated: (R) Ankle resisted PF, DF, EV, IV with yellow-red TB x 20 reps; NE (stretch in back of heel felt during DF/PF)    Seated: (R) Hallux resisted PF with yellow-red TB x 20 reps; NE    BAPs Board Lv 1: (R) foot Fwd/Wgvt-FgrjxlDqux-Hr/Ccw x 10-20 reps ea; NE (stretch)    Seated: (R) foot on floor on towel: scrunches and windshield wipers x 20 reps each; NE    Seated: (R) heel slides over towel to stretch achilles lightly. 10 reps x 5 cts hold; NE (felt good)    Seated: (R) Foot roll over bar x 30 reps; NE (felt really good)    Gait Trainin laps x 20 ft; NE (cued to promote symmetrical step with heel to toe progression). NuStep for LE, with UE assist, Lv 1 x 10 min; NE (stretch felt)     Gait Training with boot: 2 laps x 30 ft; NE    Weight shifting side to side / front and back b/w walker WBAT onto (R) foot 2 x 10 reps ea direction; NE (light stretch/pressure)    Seated: (R) Ankle DF light-moderate stretch with towel 10 reps x 10 cts each; NE     Seated: (R) Ankle resisted PF, DF, EV, IV with green TB x 20 reps; NE    Seated: (R) Hallux resisted PF with green TB x 20 reps; NE     BAPs Board Lv 1: (R) foot Fwd/Uwzg-TjjuasTjiz-Ew/Ccw x 20 reps ea; NE (stretch)     Seated: (R) foot on floor on towel windshield wipers x 20 reps each; NE     Seated: (R) heel slides over towel to stretch achilles lightly. 10 reps x 5 cts hold; NE  (felt good)     (B) Shuttle Leg Press 2b. (Weight bearing L>R) 2 sets x 10 reps; NE     NuStep LE only L1 x 10 mins; P, NW stretch    Seated: (R) Ankle DF stretch with towel OP x 10 reps; NE    Standing: (R) foot on chair DF stretch 2 x 10 reps; P, NW (better)    Seated: (R) Ankle/Foot DF/PF/EV/IV with greenTB x 20 reps; NE    + eccentric DF with greenTB 10 reps x 5 eccen ct ea; NE tired    Seated: (R) Hallux PF with greenTB 20 reps; NE    Seated: (R) Ankle/Foot BAPS L3 PF-DF/ EV-IV x 20 reps; NE tight    Standing // bars: (R) Ankle/Foot BAPS LE CW/CCW x 20 reps; NE tight    Standing // bars: weight shift (R) LE on floor flat side to side and fwd x 10 reps; NE tight NuStep LE only L1-2 x 10 mins; NE (stretch)    Standing: (R) foot on chair DF stretch 10 reps x 10 cts; NE (stretch)    Seated: (R) Ankle/Foot eccentric DF/PF/EV/IV with Blue TB. 10 reps x 10 eccen cts; NE    Seated: (R) Hallux PF with Blue TB x 20 reps; NE    Standing // bars: (R) Ankle/Foot BAPS LE CW/CCW x 20 reps; NE tight    Standing // bars: weight shift (R) LE on floor flat side to side and fwd x 10 reps; NE    (R) Shuttle LE 1-2b x 10 reps; NE    Standing: (R) foot on chair DF stretch 10 reps x 10 cts; NE (stretch)     Gait Training    Gait training in // bars WBAT with UE support. 4 laps x 10 feet ea; NE (stiffness in heel) cued on heel toe progression.   Therapeutic Exercise Min 45 38 46 45   Neuro Re-Ed Min  0     Gait Training Min  10     Total of Timed Procedures 45 48 46 45   Total of Service Based 0 0 0 0   Total Treatment Time 45 48 48 45         HEP  Standing: (R) foot on chair DF stretch/mob x 10-20 reps 2-3x/day    Long sitting: (R) Eccentric DF/PF, Inv/Susanna Strengthening with BlueTB. 10 reps x 10 eccen cts ea; 2x/day    Charges     Therapy Ex x 3    On this date patient was seen by Jose Castro PTA under the supervision of Koko Medley PT. Cert MDT.

## 2025-02-28 ENCOUNTER — OFFICE VISIT (OUTPATIENT)
Dept: PHYSICAL THERAPY | Age: 50
End: 2025-02-28
Attending: ORTHOPAEDIC SURGERY
Payer: COMMERCIAL

## 2025-02-28 PROCEDURE — 97110 THERAPEUTIC EXERCISES: CPT

## 2025-02-28 PROCEDURE — 97530 THERAPEUTIC ACTIVITIES: CPT

## 2025-02-28 PROCEDURE — 97116 GAIT TRAINING THERAPY: CPT

## 2025-03-01 NOTE — PROGRESS NOTES
Patient: Sonny Baker (49 year old, male) Referring Provider:  Insurance:   Diagnosis: Achilles tendinosis of right ankle (M67.873) Karis Beasley  JACMAZIN   Date of Surgery: 1/2/2025 Next MD visit:  N/A   Precautions:  Other (use comment) ((R) LE WBAT (using walking boot)) 3/19/2025 Referral Information:    Date of Evaluation: Req: 0, Auth: 0, Exp:     02/12/25 POC Auth Visits:  12       Today's Date   2/28/2025    Subjective  Vinay cites being symptom free in the (R) heel at this time.  He will continue to experience symptoms in the (R) heel with prolonged standing.  He reports still doing his HEP 2x/day.       Pain: 0/10     Objective  Gait: Pt ambulates without pain, on level ground with boot; improved step length; improved stance phase on (R) LE; improved foot clearance.           Assessment  Vinay continues to exhibit improvements in WB of the (R) foot without any onset of symptoms - has initiated gait training without boot WBAT with UE support b/w // bars.  He was progressed in resistance with his ankle strengthening exercises and was able to perform single leg shuttle leg press with greater resistance without any onset of symptoms.  He was advised to perform ankle DF over a chair with a towel under the forefoot in order to promote greater ROM into DF planes of motion.  He was instrcuted to continue with his current HEP.  He verbalized understanding/agreement and all questions answered at this time.    Goals (to be met in 12 visits)   Goals                2/24/25    Therapy Goals   On track    1. Patient to consistently perform their HEP and it's progression to maintain their improved condition.  2. Patient to have reduced and abolished symptoms to to be able to place full weight, walk, climb up/down stairs, run, and drive without producing symptoms in the (R) heel/foot.   3. Patient to have WNL of (R) Ankle/Foot AROM in all directions with 4-5/5 MMT in all motions to promote all home, work, recreational,  and functional activities without discomfort or deviation.               Plan  Plan to re-assess next session: Treatment will include: Gait training; Therapeutic Activities; Therapeutic Exercise; Home Exercise Program instruction; Other (use comment) (Stretching, mobilization, balance/proprioceptive exercises, patient education, and HEP progression.)    Treatment Last 4 Visits       2/17/2025 2/21/2025 2/24/2025 2/28/2025   PT Treatment   Treatment Day 3 4 5 6   Therapeutic Exercise NuStep for LE, with UE assist, Lv 1 x 10 min; NE (stretch felt)     Gait Training with boot: 2 laps x 30 ft; NE    Weight shifting side to side / front and back b/w walker WBAT onto (R) foot 2 x 10 reps ea direction; NE (light stretch/pressure)    Seated: (R) Ankle DF light-moderate stretch with towel 10 reps x 10 cts each; NE     Seated: (R) Ankle resisted PF, DF, EV, IV with green TB x 20 reps; NE    Seated: (R) Hallux resisted PF with green TB x 20 reps; NE     BAPs Board Lv 1: (R) foot Fwd/Kucp-FdwbaaYicy-Ud/Ccw x 20 reps ea; NE (stretch)     Seated: (R) foot on floor on towel windshield wipers x 20 reps each; NE     Seated: (R) heel slides over towel to stretch achilles lightly. 10 reps x 5 cts hold; NE (felt good)     (B) Shuttle Leg Press 2b. (Weight bearing L>R) 2 sets x 10 reps; NE     NuStep LE only L1 x 10 mins; P, NW stretch    Seated: (R) Ankle DF stretch with towel OP x 10 reps; NE    Standing: (R) foot on chair DF stretch 2 x 10 reps; P, NW (better)    Seated: (R) Ankle/Foot DF/PF/EV/IV with greenTB x 20 reps; NE    + eccentric DF with greenTB 10 reps x 5 eccen ct ea; NE tired    Seated: (R) Hallux PF with greenTB 20 reps; NE    Seated: (R) Ankle/Foot BAPS L3 PF-DF/ EV-IV x 20 reps; NE tight    Standing // bars: (R) Ankle/Foot BAPS LE CW/CCW x 20 reps; NE tight    Standing // bars: weight shift (R) LE on floor flat side to side and fwd x 10 reps; NE tight NuStep LE only L1-2 x 10 mins; NE (stretch)    Standing: (R) foot on  chair DF stretch 10 reps x 10 cts; NE (stretch)    Seated: (R) Ankle/Foot eccentric DF/PF/EV/IV with Blue TB. 10 reps x 10 eccen cts; NE    Seated: (R) Hallux PF with Blue TB x 20 reps; NE    Standing // bars: (R) Ankle/Foot BAPS LE CW/CCW x 20 reps; NE tight    Standing // bars: weight shift (R) LE on floor flat side to side and fwd x 10 reps; NE    (R) Shuttle LE 1-2b x 10 reps; NE    Standing: (R) foot on chair DF stretch 10 reps x 10 cts; NE (stretch)   NuStep LE only L2 x 10 mins; NE (stretch)     - Ambulation: tight around and under the (R) foot    Standing: (R) foot on chair DF stretch 10 reps x 10 cts; NE (stretch)     - retest ambulation: less restriction noted    Standing: (R) foot on chair DF stretch and towel metatarsals of forefoot 10 reps x 10 cts; NE (strong stretch)    - retest ambulation: less restriction than previous     Seated: (R) Ankle/Foot eccentric DF/PF/EV/IV with Blue-Black TB. 10 reps x 10 eccen cts; NE     Seated: (R) Hallux PF with Black TB x 20 reps; NE     Standing // bars: (R) Ankle/Foot BAPS LE CW/CCW x 20 reps; NE tight     Standing // bars: weight shift (R) LE on floor flat side to side and fwd/bkwd x 10 reps; NE     Gait training in // bars x 4 laps; NE (cued to emphasized heel to toe progression)    (R) Shuttle LE 2b x 10 reps; NE     Standing: (R) foot on chair DF stretch with towel under metatarsals of forefoot 10 reps x 10 cts; NE (stretch)      Gait Training   Gait training in // bars WBAT with UE support. 4 laps x 10 feet ea; NE (stiffness in heel) cued on heel toe progression.    Therapeutic Exercise Min 38 46 45 25   Neuro Re-Ed Min 0      Therapeutic Activity Min    10   Gait Training Min 10   15   Total of Timed Procedures 48 46 45 50   Total of Service Based 0 0 0 0   Total Treatment Time 48 48 45 50         HEP  Standing: (R) foot on chair DF stretch with towel under metatarsals of forefoot 10 reps x 10 cts; 3-4x/day    Long sitting: (R) ankle eccentric strengthening  with black/blue TB into DF, PF, Inv, Susanna 10 reps x 10 cts; 2x/day     Weight shifting fwd/bkwd and lbvs2piph x 10 reps each to Gait training with heel to toe emphasis 3 laps x 20 ft; 2x/day    Charges     Therapy Ex x 2, Therapy Act x 1, Gait training x 1     On this date patient was seen by Jose Castro PTA under the supervision of Koko Medley PT. Cert MDT.

## 2025-03-03 ENCOUNTER — OFFICE VISIT (OUTPATIENT)
Dept: PHYSICAL THERAPY | Age: 50
End: 2025-03-03
Attending: ORTHOPAEDIC SURGERY
Payer: COMMERCIAL

## 2025-03-03 PROCEDURE — 97530 THERAPEUTIC ACTIVITIES: CPT

## 2025-03-03 PROCEDURE — 97110 THERAPEUTIC EXERCISES: CPT

## 2025-03-03 NOTE — PROGRESS NOTES
Patient: Sonny Baker (49 year old, male) Referring Provider:  Insurance:   Diagnosis: Achilles tendinosis of right ankle (M67.873) Karis Beasley  JACMAZIN   Date of Surgery: 1/2/2025 Next MD visit:  N/A   Precautions:  Other (use comment) ((R) LE WBAT (using walking boot)) 3/19/2025 Referral Information:    Date of Evaluation: Req: 0, Auth: 0, Exp:     02/12/25 POC Auth Visits:  12       Today's Date   3/3/2025    Subjective  Vinay currently reports being symptom free in the (R) heel/foot; he will tend to experience minor tightness/restriction with the first couple of steps out of bed.  He has not tried to negotiate steps and has been practicing walking at home without his boot to which he did not report having any onset/exacerbation of symptoms during the activity.  He continues to perform his HEP 2x/day minimum.       Pain: 0/10     Objective  Gait: Pt is able to ambulate without boot in clinic, without pain, proper heel to toe progression, WBOS, and symmetrical step length.           Assessment  Vinay is now able to ambulate with full WB on the (R) foot without any onset of symptoms.  He has initiated gait training without the boot at home and was able to do the same during today's session. Stair negotiation was initiated via 4 inch step to which pt did not experience any pain. He is progressing in resistance with his ankle strengthening exercises and was able to perform single leg shuttle leg press with greater resistance without any onset of symptoms.  He was advised to continue performing his current HEP.  He verbalized understanding/agreement and all questions answered at this time.    Goals (to be met in 12 visits)   Goals                Today    2/24/25    Therapy Goals   On track  On track    1. Patient to consistently perform their HEP and it's progression to maintain their improved condition.  2. Patient to have reduced and abolished symptoms to to be able to place full weight, walk, climb up/down  stairs, run, and drive without producing symptoms in the (R) heel/foot.   3. Patient to have WNL of (R) Ankle/Foot AROM in all directions with 4-5/5 MMT in all motions to promote all home, work, recreational, and functional activities without discomfort or deviation.               Plan  Plan to re-assess next session: Treatment will include: Gait training; Therapeutic Activities; Therapeutic Exercise; Home Exercise Program instruction; Other (use comment) (Stretching, mobilization, balance/proprioceptive exercises, patient education, and HEP progression.)    Treatment Last 4 Visits       2/21/2025 2/24/2025 2/28/2025 3/3/2025   PT Treatment   Treatment Day 4 5 6 7   Therapeutic Exercise NuStep LE only L1 x 10 mins; P, NW stretch    Seated: (R) Ankle DF stretch with towel OP x 10 reps; NE    Standing: (R) foot on chair DF stretch 2 x 10 reps; P, NW (better)    Seated: (R) Ankle/Foot DF/PF/EV/IV with greenTB x 20 reps; NE    + eccentric DF with greenTB 10 reps x 5 eccen ct ea; NE tired    Seated: (R) Hallux PF with greenTB 20 reps; NE    Seated: (R) Ankle/Foot BAPS L3 PF-DF/ EV-IV x 20 reps; NE tight    Standing // bars: (R) Ankle/Foot BAPS LE CW/CCW x 20 reps; NE tight    Standing // bars: weight shift (R) LE on floor flat side to side and fwd x 10 reps; NE tight NuStep LE only L1-2 x 10 mins; NE (stretch)    Standing: (R) foot on chair DF stretch 10 reps x 10 cts; NE (stretch)    Seated: (R) Ankle/Foot eccentric DF/PF/EV/IV with Blue TB. 10 reps x 10 eccen cts; NE    Seated: (R) Hallux PF with Blue TB x 20 reps; NE    Standing // bars: (R) Ankle/Foot BAPS LE CW/CCW x 20 reps; NE tight    Standing // bars: weight shift (R) LE on floor flat side to side and fwd x 10 reps; NE    (R) Shuttle LE 1-2b x 10 reps; NE    Standing: (R) foot on chair DF stretch 10 reps x 10 cts; NE (stretch)   NuStep LE only L2 x 10 mins; NE (stretch)     - Ambulation: tight around and under the (R) foot    Standing: (R) foot on chair DF  stretch 10 reps x 10 cts; NE (stretch)     - retest ambulation: less restriction noted    Standing: (R) foot on chair DF stretch and towel metatarsals of forefoot 10 reps x 10 cts; NE (strong stretch)    - retest ambulation: less restriction than previous     Seated: (R) Ankle/Foot eccentric DF/PF/EV/IV with Blue-Black TB. 10 reps x 10 eccen cts; NE     Seated: (R) Hallux PF with Black TB x 20 reps; NE     Standing // bars: (R) Ankle/Foot BAPS LE CW/CCW x 20 reps; NE tight     Standing // bars: weight shift (R) LE on floor flat side to side and fwd/bkwd x 10 reps; NE     Gait training in // bars x 4 laps; NE (cued to emphasized heel to toe progression)    (R) Shuttle LE 2b x 10 reps; NE     Standing: (R) foot on chair DF stretch with towel under metatarsals of forefoot 10 reps x 10 cts; NE (stretch)    NuStep LE only L3 x 10 mins; NE    Standing: (R) foot on chair DF stretch with towel under metatarsals. 10 reps x 10 cts; NE (stretch)     - retest ambulation: less restriction noted     Seated: (R) Ankle/Foot eccentric DF/PF/EV/IV with Black TB. 10 reps x 10 eccen cts; NE     Standing // bars: (R) Ankle/Foot BAPS Lv3-4. CW/CCW x 20 reps; NE tight     (R) Shuttle LE 3-4b 2 sets x 10 reps; NE (felt good in joint)    (R) Shuttle Calf Press 1-2b. 2 sets x 10 reps; NE (stretch and fatigue in calf)    Rhomberg to Semi tandem to tandem stance on blue foam. 3 sets x 30 sec; NE    Standing: (R) foot on chair DF stretch with towel under metatarsals 10 reps x 10 cts; NE (stretch)      Therapeutic Activity    Fwd Step ups onto 4-6 inch step with (B) UE support b/w // bars leading with (R/L) LE 2 sets x 10 reps ea; NE.   Gait Training  Gait training in // bars WBAT with UE support. 4 laps x 10 feet ea; NE (stiffness in heel) cued on heel toe progression.  Gait training without boot x 30 feet; NE restriction noted)   Therapeutic Exercise Min 46 45 25 45   Therapeutic Activity Min   10 10   Gait Training Min   15    Total of Timed  Procedures 46 45 50 55   Total of Service Based 0 0 0 0   Total Treatment Time 48 45 50 55         HEP  Standing: (R) foot on chair DF stretch with towel under metatarsals of forefoot 10 reps x 10 cts; 3-4x/day    Long sitting: (R) ankle eccentric strengthening with black/blue TB into DF, PF, Inv, Susanna 10 reps x 10 cts; 2x/day     Weight shifting fwd/bkwd and [id number] x 10 reps each to Gait training with heel to toe emphasis 3 laps x 20 ft; 2x/day    Charges     Therapy Ex x 3, Therapy Act x 1     On this date patient was seen by Jose Castro PTA under the supervision of Koko Medley PT. Cert MDT.

## 2025-03-07 ENCOUNTER — OFFICE VISIT (OUTPATIENT)
Dept: PHYSICAL THERAPY | Age: 50
End: 2025-03-07
Attending: ORTHOPAEDIC SURGERY
Payer: COMMERCIAL

## 2025-03-07 PROCEDURE — 97110 THERAPEUTIC EXERCISES: CPT

## 2025-03-07 NOTE — PROGRESS NOTES
Patient: Sonny Baker (49 year old, male) Referring Provider:  Insurance:   Diagnosis: Achilles tendinosis of right ankle (M67.873) Karis GIANG   Date of Surgery: 1/2/2025  N/A   Precautions:  Other (use comment) ((R) LE WBAT (using walking boot))  Referral Information:    Date of Evaluation: Req: 0, Auth: 0, Exp:     02/12/25 POC Auth Visits:  12       Today's Date   3/7/2025    Subjective  Vinay reports feeling symptom free in the (R) ankle on this date, he says that his ankle will feel \"tight\" after taking his first few steps out of bed or after prolonged sitting.  He says that he has not been wearing his boot at home, but he is wearing it in public/community.  He has been doing his HEP regularly 2x/day minimum.       Pain: 0/10     Objective  Gait: Pt is able to ambulate without boot in clinic, without pain, proper heel to toe progression, WBOS, and symmetrical step length.     Assessment  Vinay is able to ambulate with full WB on the (R) foot without any onset of symptoms.  He has successfully been progressing in resistance with his ankle strengthening exercises and has initiated stair negotiation via a 4-6 inch step, to which the pt did not experience any pain during the intervention.  During gait training he is often reminded to emphasize heel strike at the end of swing phase of the (R) LE.  He was advised to continue performing his current HEP.  He verbalized understanding/agreement and all questions answered at this time.    Goals (to be met in 12 visits) On track  1. Patient to consistently perform their HEP and it's progression to maintain their improved condition.  2. Patient to have reduced and abolished symptoms to to be able to place full weight, walk, climb up/down stairs, run, and drive without producing symptoms in the (R) heel/foot.   3. Patient to have WNL of (R) Ankle/Foot AROM in all directions with 4-5/5 MMT in all motions to promote all home, work, recreational, and functional  activities without discomfort or deviation.      Plan  Plan to re-assess next session: Treatment will include: Gait training; Therapeutic Activities; Therapeutic Exercise; Home Exercise Program instruction; Other (use comment) (Stretching, mobilization, balance/proprioceptive exercises, patient education, and HEP progression.)    Treatment Last 4 Visits       2/24/2025 2/28/2025 3/3/2025 3/7/2025   PT Treatment   Treatment Day 5 6 7 8   Therapeutic Exercise NuStep LE only L1-2 x 10 mins; NE (stretch)    Standing: (R) foot on chair DF stretch 10 reps x 10 cts; NE (stretch)    Seated: (R) Ankle/Foot eccentric DF/PF/EV/IV with Blue TB. 10 reps x 10 eccen cts; NE    Seated: (R) Hallux PF with Blue TB x 20 reps; NE    Standing // bars: (R) Ankle/Foot BAPS LE CW/CCW x 20 reps; NE tight    Standing // bars: weight shift (R) LE on floor flat side to side and fwd x 10 reps; NE    (R) Shuttle LE 1-2b x 10 reps; NE    Standing: (R) foot on chair DF stretch 10 reps x 10 cts; NE (stretch)   NuStep LE only L2 x 10 mins; NE (stretch)     - Ambulation: tight around and under the (R) foot    Standing: (R) foot on chair DF stretch 10 reps x 10 cts; NE (stretch)     - retest ambulation: less restriction noted    Standing: (R) foot on chair DF stretch and towel metatarsals of forefoot 10 reps x 10 cts; NE (strong stretch)    - retest ambulation: less restriction than previous     Seated: (R) Ankle/Foot eccentric DF/PF/EV/IV with Blue-Black TB. 10 reps x 10 eccen cts; NE     Seated: (R) Hallux PF with Black TB x 20 reps; NE     Standing // bars: (R) Ankle/Foot BAPS LE CW/CCW x 20 reps; NE tight     Standing // bars: weight shift (R) LE on floor flat side to side and fwd/bkwd x 10 reps; NE     Gait training in // bars x 4 laps; NE (cued to emphasized heel to toe progression)    (R) Shuttle LE 2b x 10 reps; NE     Standing: (R) foot on chair DF stretch with towel under metatarsals of forefoot 10 reps x 10 cts; NE (stretch)    Francis TAN  only L3 x 10 mins; NE    Standing: (R) foot on chair DF stretch with towel under metatarsals. 10 reps x 10 cts; NE (stretch)     - retest ambulation: less restriction noted     Seated: (R) Ankle/Foot eccentric DF/PF/EV/IV with Black TB. 10 reps x 10 eccen cts; NE     Standing // bars: (R) Ankle/Foot BAPS Lv3-4. CW/CCW x 20 reps; NE tight     (R) Shuttle LE 3-4b 2 sets x 10 reps; NE (felt good in joint)    (R) Shuttle Calf Press 1-2b. 2 sets x 10 reps; NE (stretch and fatigue in calf)    Rhomberg to Semi tandem to tandem stance on blue foam. 3 sets x 30 sec; NE    Standing: (R) foot on chair DF stretch with towel under metatarsals 10 reps x 10 cts; NE (stretch)    NuStep LE only L3 x 10 mins; NE (stretch)    PWB in standing: (R) foot on chair DF stretch with towel under metatarsals. 10 reps x 10 cts; NE (stretch)     FWB in standing: (R) DF stretch with thin towel pad under metatarsals. 2 sets x 5 reps; NE (strong stretch)    - retest ambulation: less restriction noted     Seated: (R) Ankle/Foot eccentric DF/PF/EV/IV with Black TB. 10 reps x 10 eccen cts; NE     Standing // bars: (R) Ankle/Foot BAPS Lv4. CW/CCW x 20 reps; NE tight     (R) Shuttle LE 4-5b 2 sets x 20 reps; NE (felt good in joint)     (R) Shuttle Calf Press 1-2b. 2 sets x 10 reps; NE (stretch and fatigue in calf)     Alternating Tandem stance on blue foam. 3 sets x 15 sec ea; NE     FWB in standing: (R) DF stretch with thin towel pad under metatarsals. 2 sets x 5 reps; NE (strong stretch)    - retest: no pain/ache in (R) foot     Therapeutic Activity   Fwd Step ups onto 4-6 inch step with (B) UE support b/w // bars leading with (R/L) LE 2 sets x 10 reps ea; NE. Fwd Step ups onto 4-6 inch step with (B) UE support b/w // bars leading with (R/L) LE 2 sets x 10 reps ea; NE.       Gait Training Gait training in // bars WBAT with UE support. 4 laps x 10 feet ea; NE (stiffness in heel) cued on heel toe progression.  Gait training without boot x 30 feet; NE  restriction noted) Gait training without boot 2 laps x 30 feet; NE (min restriction noted)   Therapeutic Exercise Min 45 25 45 45   Therapeutic Activity Min  10 10    Gait Training Min  15     Total of Timed Procedures 45 50 55 45   Total of Service Based 0 0 0 0   Total Treatment Time 45 50 55 45         HEP  - Standing: (R) foot on towel pad under metatarsals of forefoot 10 reps x 10 cts; 2-3x/day  - Long sitting: (R) ankle eccentric strengthening with black/blue TB into DF, PF, Inv, Susanna 10 reps x 10 cts; 2x/day     Charges     Therapy Ex x 3      On this date patient was seen by Jose Castro PTA under the supervision of Koko Medley PT. Cert MDT.

## 2025-03-11 ENCOUNTER — PATIENT MESSAGE (OUTPATIENT)
Age: 50
End: 2025-03-11

## 2025-03-12 ENCOUNTER — OFFICE VISIT (OUTPATIENT)
Dept: PHYSICAL THERAPY | Age: 50
End: 2025-03-12
Attending: ORTHOPAEDIC SURGERY
Payer: COMMERCIAL

## 2025-03-12 PROCEDURE — 97116 GAIT TRAINING THERAPY: CPT | Performed by: PHYSICAL THERAPIST

## 2025-03-12 PROCEDURE — 97110 THERAPEUTIC EXERCISES: CPT | Performed by: PHYSICAL THERAPIST

## 2025-03-12 NOTE — PROGRESS NOTES
Patient: Sonny Baker (49 year old, male) Referring Provider:  Insurance:   Diagnosis: Achilles tendinosis of right ankle (M67.873) Karis GIANG   Date of Surgery: 1/2/2025  N/A   Precautions:  Other (use comment) ((R) LE WBAT (using walking boot))  Referral Information:    Date of Evaluation: Req: 0, Auth: 0, Exp:     02/12/25 POC Auth Visits:  12       Today's Date   3/12/2025    Subjective  Vinay states that his (R) Ankle is doing good.  He is walking at home without a boot.  He feels more tightness/stiffness in the back of the (R) leg (calf) and heel area.       Pain: 0/10     Objective  Gait pattern: NBOS with decreased heel-toe and push-off on (L) LE but with equal step length     Assessment  Vinay is slowly progressing wtih (R) ankle/foot strengthening and stability exercises.  He is able to full weight bear on the (R) foot/ankle without pain/ache but report tightness/stretch in the (R) calf and achilles tendon area but no worse as a result.  He required minimal cueing to promote (R) LE heel-toe and push-off pattern to learn a good walking pattern.  He was recommended not to use the (R) LE immobilizer when at home and while in the PT department.  He is to continue with his current HEP.  All questions and concerns were answered and addressed at this time.     Plan  Re-assess next sesstion and continue with Stretching, mobilization, balance/proprioceptive exercises, patient education, and HEP progression.    Treatment Last 4 Visits       2/28/2025 3/3/2025 3/7/2025 3/12/2025   PT Treatment   Treatment Day 6 7 8 9   Therapeutic Exercise NuStep LE only L2 x 10 mins; NE (stretch)     - Ambulation: tight around and under the (R) foot    Standing: (R) foot on chair DF stretch 10 reps x 10 cts; NE (stretch)     - retest ambulation: less restriction noted    Standing: (R) foot on chair DF stretch and towel metatarsals of forefoot 10 reps x 10 cts; NE (strong stretch)    - retest ambulation: less  restriction than previous     Seated: (R) Ankle/Foot eccentric DF/PF/EV/IV with Blue-Black TB. 10 reps x 10 eccen cts; NE     Seated: (R) Hallux PF with Black TB x 20 reps; NE     Standing // bars: (R) Ankle/Foot BAPS LE CW/CCW x 20 reps; NE tight     Standing // bars: weight shift (R) LE on floor flat side to side and fwd/bkwd x 10 reps; NE     Gait training in // bars x 4 laps; NE (cued to emphasized heel to toe progression)    (R) Shuttle LE 2b x 10 reps; NE     Standing: (R) foot on chair DF stretch with towel under metatarsals of forefoot 10 reps x 10 cts; NE (stretch)    NuStep LE only L3 x 10 mins; NE    Standing: (R) foot on chair DF stretch with towel under metatarsals. 10 reps x 10 cts; NE (stretch)     - retest ambulation: less restriction noted     Seated: (R) Ankle/Foot eccentric DF/PF/EV/IV with Black TB. 10 reps x 10 eccen cts; NE     Standing // bars: (R) Ankle/Foot BAPS Lv3-4. CW/CCW x 20 reps; NE tight     (R) Shuttle LE 3-4b 2 sets x 10 reps; NE (felt good in joint)    (R) Shuttle Calf Press 1-2b. 2 sets x 10 reps; NE (stretch and fatigue in calf)    Rhomberg to Semi tandem to tandem stance on blue foam. 3 sets x 30 sec; NE    Standing: (R) foot on chair DF stretch with towel under metatarsals 10 reps x 10 cts; NE (stretch)    NuStep LE only L3 x 10 mins; NE (stretch)    PWB in standing: (R) foot on chair DF stretch with towel under metatarsals. 10 reps x 10 cts; NE (stretch)     FWB in standing: (R) DF stretch with thin towel pad under metatarsals. 2 sets x 5 reps; NE (strong stretch)    - retest ambulation: less restriction noted     Seated: (R) Ankle/Foot eccentric DF/PF/EV/IV with Black TB. 10 reps x 10 eccen cts; NE     Standing // bars: (R) Ankle/Foot BAPS Lv4. CW/CCW x 20 reps; NE tight     (R) Shuttle LE 4-5b 2 sets x 20 reps; NE (felt good in joint)     (R) Shuttle Calf Press 1-2b. 2 sets x 10 reps; NE (stretch and fatigue in calf)     Alternating Tandem stance on blue foam. 3 sets x 15  sec ea; NE     FWB in standing: (R) DF stretch with thin towel pad under metatarsals. 2 sets x 5 reps; NE (strong stretch)    - retest: no pain/ache in (R) foot   NuStep LE only L1 S9-5 x 10 mins; NE pull/stretch (R) calf    (B) LE calf stretch on inclined step L3 3 x 30 secs ea; NE stretch     Standing: (R) Ankle DF mobs with towel at balls of feet x 20 reps; NE stretch calf    Standing: BAPS (R) LE L4 PF-DF, IV-EV, CW, CCW x 20-10 reps ea; NE    + SLStance on BAPS x 5 reps ea; NE    Seated: (R) Ankle PF, EV, IV, DF with blackTB x 20 reps; NE     Hydrant (L/R) LE 1 kg ball 5 reps x 10 cts ea; NE    Shuttle: (R) leg press 8b x 20 reps; NE    Shuttle: (R) calf raises 2b 2 x 10 reps; NE    (R) SLStance on bluefoam with (L) foot foam roll tap 2 x 10 reps; NE pull      Monster walk fwd/bkwd with redTB abduction resist 3 laps x 30 feet; NE    (R) Ankle DF mob/stretch with towel roll under balls of foot x 20 reps; NE stretch   Therapeutic Activity  Fwd Step ups onto 4-6 inch step with (B) UE support b/w // bars leading with (R/L) LE 2 sets x 10 reps ea; NE. Fwd Step ups onto 4-6 inch step with (B) UE support b/w // bars leading with (R/L) LE 2 sets x 10 reps ea; NE.        Gait Training  Gait training without boot x 30 feet; NE restriction noted) Gait training without boot 2 laps x 30 feet; NE (min restriction noted) Ambulation in department 4 laps x 30 feet with cueing to promote heel-toe and push-off and equalize step length on (R) LE     Therapeutic Exercise Min 25 45 45 43   Therapeutic Activity Min 10 10     Gait Training Min 15   10   Total of Timed Procedures 50 55 45 53   Total of Service Based 0 0 0 0   Total Treatment Time 50 55 45 56         HEP  (R) Ankle DF mobs with towel roll under toes x 20 reps 2-3x/day    (R) Ankle blackTB DF, PF, EV, IV x 20 reps x 2-3x/day    Ambulation at home without an immobilizer    Charges     TherEx x 3;  Gait traning x 1

## 2025-03-14 ENCOUNTER — OFFICE VISIT (OUTPATIENT)
Dept: PHYSICAL THERAPY | Age: 50
End: 2025-03-14
Attending: ORTHOPAEDIC SURGERY
Payer: COMMERCIAL

## 2025-03-14 PROCEDURE — 97110 THERAPEUTIC EXERCISES: CPT | Performed by: PHYSICAL THERAPIST

## 2025-03-14 NOTE — PROGRESS NOTES
Patient: Sonny Baker (49 year old, male) Referring Provider:  Insurance:   Diagnosis: Achilles tendinosis of right ankle (M67.873) Karis Beasley  JACMAZIN   Date of Surgery: 1/2/2025  N/A   Precautions:  Other (use comment) ((R) LE WBAT (using walking boot))  Referral Information:    Date of Evaluation: Req: 0, Auth: 0, Exp:     02/12/25 POC Auth Visits:  12       Today's Date   3/14/2025    Subjective  Rich report that his (R) Ankle/Foot is feeling loose and good.  He did not feel any tightness/ache in the (R) ankle/foot in the morning getting up from bed were he usually would feel tightness.       Pain: 0/10     Objective  Normal gait pattern with a slow and stready gait with gym shoes on (B) feet.  No pain/ache stepping just feels tight in the (R) heel.     Assessment  Vinay continue to have tightness in the (R) calf, achilles, tendon, and heel area during exercises but no worse as a result.  He is progressing with increased repetition and resistance.  Minimal cuieng needed to promote knee-toe alignment and heel-toe pattern. All questions and concerns were answered at this time.     Plan  Re-assess next sesstion and continue with Stretching, mobilization, balance/proprioceptive exercises, patient education, and HEP progression.    Treatment Last 4 Visits       3/3/2025 3/7/2025 3/12/2025 3/14/2025   PT Treatment   Treatment Day 7 8 9 10   Therapeutic Exercise NuStep LE only L3 x 10 mins; NE    Standing: (R) foot on chair DF stretch with towel under metatarsals. 10 reps x 10 cts; NE (stretch)     - retest ambulation: less restriction noted     Seated: (R) Ankle/Foot eccentric DF/PF/EV/IV with Black TB. 10 reps x 10 eccen cts; NE     Standing // bars: (R) Ankle/Foot BAPS Lv3-4. CW/CCW x 20 reps; NE tight     (R) Shuttle LE 3-4b 2 sets x 10 reps; NE (felt good in joint)    (R) Shuttle Calf Press 1-2b. 2 sets x 10 reps; NE (stretch and fatigue in calf)    Rhomberg to Semi tandem to tandem stance on blue foam. 3  sets x 30 sec; NE    Standing: (R) foot on chair DF stretch with towel under metatarsals 10 reps x 10 cts; NE (stretch)    NuStep LE only L3 x 10 mins; NE (stretch)    PWB in standing: (R) foot on chair DF stretch with towel under metatarsals. 10 reps x 10 cts; NE (stretch)     FWB in standing: (R) DF stretch with thin towel pad under metatarsals. 2 sets x 5 reps; NE (strong stretch)    - retest ambulation: less restriction noted     Seated: (R) Ankle/Foot eccentric DF/PF/EV/IV with Black TB. 10 reps x 10 eccen cts; NE     Standing // bars: (R) Ankle/Foot BAPS Lv4. CW/CCW x 20 reps; NE tight     (R) Shuttle LE 4-5b 2 sets x 20 reps; NE (felt good in joint)     (R) Shuttle Calf Press 1-2b. 2 sets x 10 reps; NE (stretch and fatigue in calf)     Alternating Tandem stance on blue foam. 3 sets x 15 sec ea; NE     FWB in standing: (R) DF stretch with thin towel pad under metatarsals. 2 sets x 5 reps; NE (strong stretch)    - retest: no pain/ache in (R) foot   NuStep LE only L1 S9-5 x 10 mins; NE pull/stretch (R) calf    (B) LE calf stretch on inclined step L3 3 x 30 secs ea; NE stretch     Standing: (R) Ankle DF mobs with towel at balls of feet x 20 reps; NE stretch calf    Standing: BAPS (R) LE L4 PF-DF, IV-EV, CW, CCW x 20-10 reps ea; NE    + SLStance on BAPS x 5 reps ea; NE    Seated: (R) Ankle PF, EV, IV, DF with blackTB x 20 reps; NE     Hydrant (L/R) LE 1 kg ball 5 reps x 10 cts ea; NE    Shuttle: (R) leg press 8b x 20 reps; NE    Shuttle: (R) calf raises 2b 2 x 10 reps; NE    (R) SLStance on bluefoam with (L) foot foam roll tap 2 x 10 reps; NE pull      Monster walk fwd/bkwd with redTB abduction resist 3 laps x 30 feet; NE    (R) Ankle DF mob/stretch with towel roll under balls of foot x 20 reps; NE stretch NuStep LE only L1-4-1 x 10 mins; NE    (R) Ankle/Foot DF mob/stretch with towel roll under balls of foot x 10 reps; P, NW    (R) Ankle DF eccentric strengthening with blackTB 20 reps x 10 eccen ct ea; NE     (R)  Hallux PF with blackTB 2 x 20 reps; NE    Standing: (R) LE ((B) feet on board) BAPS L4 PF-DF, EV-IV, CW, CCW x 20 reps ea; NE    Shuttle: (R) leg press 8b 2 x 20 reps; NE    Shuttle: (R) calf raises 2b 2 x 20 reps; NE    (B) Calf stretch on inclined step L4 3 reps x 30 secs ea; NE    Step up forward/straddle onto a 6 inch step with redTB abduction resist (R/L) LE lead x 20 reps ea; NE     Monster walk fwd/bkwd with greenTB abduction resist 3 laps x 30 feet; NE     (R) Ankle DF mobs/stretch with wedge x 10 reps; P, NW   Therapeutic Activity Fwd Step ups onto 4-6 inch step with (B) UE support b/w // bars leading with (R/L) LE 2 sets x 10 reps ea; NE. Fwd Step ups onto 4-6 inch step with (B) UE support b/w // bars leading with (R/L) LE 2 sets x 10 reps ea; NE.         Gait Training Gait training without boot x 30 feet; NE restriction noted) Gait training without boot 2 laps x 30 feet; NE (min restriction noted) Ambulation in department 4 laps x 30 feet with cueing to promote heel-toe and push-off and equalize step length on (R) LE      Therapeutic Exercise Min 45 45 43 47   Therapeutic Activity Min 10      Gait Training Min   10    Total of Timed Procedures 55 45 53 47   Total of Service Based 0 0 0 0   Total Treatment Time 55 45 56 48         HEP  - (R) Ankle/Foot DF mob/stretch with towel roll under balls of foot x 10 reps x 1-2x/day    - (R) Ankle DF eccentric strengthening with blackTB 20 reps x 10 eccen ct each x 1-2x/day    (R) Hallux PF with blackTB 2 x 20 reps x 1-2x/day    Monster walk with greenTB abduction resist fwd/bkwd x 2-3 laps x at least 25-30 feet x 1-2x/day    Charges     TherEx x 3

## 2025-03-18 ENCOUNTER — OFFICE VISIT (OUTPATIENT)
Dept: PHYSICAL THERAPY | Age: 50
End: 2025-03-18
Attending: ORTHOPAEDIC SURGERY
Payer: COMMERCIAL

## 2025-03-18 PROCEDURE — 97110 THERAPEUTIC EXERCISES: CPT | Performed by: PHYSICAL THERAPIST

## 2025-03-18 NOTE — PROGRESS NOTES
Patient: Sonny Baker (49 year old, male) Referring Provider:  Insurance:   Diagnosis: Achilles tendinosis of right ankle (M67.873) Karis GIANG   Date of Surgery: 1/2/2025 Next MD visit:  N/A   Precautions:  Other (use comment) ((R) LE WBAT (using walking boot)) 3/19/2025 Referral Information:    Date of Evaluation: Req: 0, Auth: 0, Exp:     02/12/25 POC Auth Visits:  12       Today's Date   3/18/2025    Subjective  Vinay report that he is doing good in the (R) Ankle.  He is just feeling some tightness in the lower calf and an ache at the side of the (R) foot rated 0-2/10 when walking.  He has been diligent with his HEP and has been walking now at home and in the PT department without an immobilizer.       Pain: 0/10     Objective  Test motion: (R) LE Step up/over a 4 inch step: ache/tightness on step up and down, no symptom on step back up.       Assessment  Vinay has attended 11 physical therapy from 2/12/2025 to 3/18/2025.  He has improved (R) Ankle/Foot AROM with 3+/5 to 4-/5 MMT in all directions.  He is able to ambulate without a (R) ankle/foot immobilizer.  He is responding to (R) Ankle/Foot directional preference exercise, eccentric/concentric strengthening, stability exercise, proprioception/balance exercises, and gait training exercises.  He gertrude benefit to continue with physical therapy to attain all goals set during initial evaluation.    Goals (to be met in 12 visits)   1. Patient to consistently perform their HEP and it's progression to maintain their improved condition.  2. Patient to have reduced and abolished symptoms to to be able to place full weight, walk, climb up/down stairs, run, and drive without producing symptoms in the (R) heel/foot.   3. Patient to have WNL of (R) Ankle/Foot AROM in all directions with 4-5/5 MMT in all motions to promote all home, work, recreational, and functional activities without discomfort or deviation.      Plan  Continue with 8 more PT sessions to  progress  with directional preference exercise, stretching, strengthening/stability exercises, mobilization, balance/proprioceptive exercises, gait training, patient education, and HEP progression.

## 2025-03-18 NOTE — PROGRESS NOTES
Patient: Sonny Baker (49 year old, male) Referring Provider:  Insurance:   Diagnosis: Achilles tendinosis of right ankle (M67.873) Karis GIANG   Date of Surgery: 1/2/2025 Next MD visit:  N/A   Precautions:  Other (use comment) ((R) LE WBAT (using walking boot)) 3/19/2025 Referral Information:    Date of Evaluation: Req: 0, Auth: 0, Exp:     02/12/25 POC Auth Visits:  12       Today's Date   3/18/2025    Subjective  Vinay report that he is doing good in the (R) Ankle.  He is just feeling some tightness in the lower calf and an ache at the side of the (R) foot rated 0-2/10 when walking.  He has been diligent with his HEP and has been walking now at home and in the PT department without an immobilizer.       Pain: 0/10     Objective  Test motion: (R) LE Step up/over a 4 inch step: ache/tightness on step up and down, no symptom on step back up.       Assessment  Vinay has attended 11 physical therapy from 2/12/2025 to 3/18/2025.  He has improved (R) Ankle/Foot AROM with 3+/5 to 4-/5 MMT in all directions.  He is able to ambulate without a (R) ankle/foot immobilizer.  He is responding to (R) Ankle/Foot directional preference exercise, eccentric/concentric strengthening, stability exercise, proprioception/balance exercises, and gait training exercises.  He gertrude benefit to continue with physical therapy to attain all goals set during initial evaluation.    Goals (to be met in 12 visits)   1. Patient to consistently perform their HEP and it's progression to maintain their improved condition.  2. Patient to have reduced and abolished symptoms to to be able to place full weight, walk, climb up/down stairs, run, and drive without producing symptoms in the (R) heel/foot.   3. Patient to have WNL of (R) Ankle/Foot AROM in all directions with 4-5/5 MMT in all motions to promote all home, work, recreational, and functional activities without discomfort or deviation.      Plan  Continue with 8 more PT sessions to  progress  with directional preference exercise, stretching, strengthening/stability exercises, mobilization, balance/proprioceptive exercises, gait training, patient education, and HEP progression.    Treatment Last 4 Visits       3/7/2025 3/12/2025 3/14/2025 3/18/2025   PT Treatment   Treatment Day 8 9 10 11   Therapeutic Exercise NuStep LE only L3 x 10 mins; NE (stretch)    PWB in standing: (R) foot on chair DF stretch with towel under metatarsals. 10 reps x 10 cts; NE (stretch)     FWB in standing: (R) DF stretch with thin towel pad under metatarsals. 2 sets x 5 reps; NE (strong stretch)    - retest ambulation: less restriction noted     Seated: (R) Ankle/Foot eccentric DF/PF/EV/IV with Black TB. 10 reps x 10 eccen cts; NE     Standing // bars: (R) Ankle/Foot BAPS Lv4. CW/CCW x 20 reps; NE tight     (R) Shuttle LE 4-5b 2 sets x 20 reps; NE (felt good in joint)     (R) Shuttle Calf Press 1-2b. 2 sets x 10 reps; NE (stretch and fatigue in calf)     Alternating Tandem stance on blue foam. 3 sets x 15 sec ea; NE     FWB in standing: (R) DF stretch with thin towel pad under metatarsals. 2 sets x 5 reps; NE (strong stretch)    - retest: no pain/ache in (R) foot   NuStep LE only L1 S9-5 x 10 mins; NE pull/stretch (R) calf    (B) LE calf stretch on inclined step L3 3 x 30 secs ea; NE stretch     Standing: (R) Ankle DF mobs with towel at balls of feet x 20 reps; NE stretch calf    Standing: BAPS (R) LE L4 PF-DF, IV-EV, CW, CCW x 20-10 reps ea; NE    + SLStance on BAPS x 5 reps ea; NE    Seated: (R) Ankle PF, EV, IV, DF with blackTB x 20 reps; NE     Hydrant (L/R) LE 1 kg ball 5 reps x 10 cts ea; NE    Shuttle: (R) leg press 8b x 20 reps; NE    Shuttle: (R) calf raises 2b 2 x 10 reps; NE    (R) SLStance on bluefoam with (L) foot foam roll tap 2 x 10 reps; NE pull      Monster walk fwd/bkwd with redTB abduction resist 3 laps x 30 feet; NE    (R) Ankle DF mob/stretch with towel roll under balls of foot x 20 reps; NE stretch  NuStep LE only L1-4-1 x 10 mins; NE    (R) Ankle/Foot DF mob/stretch with towel roll under balls of foot x 10 reps; P, NW    (R) Ankle DF eccentric strengthening with blackTB 20 reps x 10 eccen ct ea; NE     (R) Hallux PF with blackTB 2 x 20 reps; NE    Standing: (R) LE ((B) feet on board) BAPS L4 PF-DF, EV-IV, CW, CCW x 20 reps ea; NE    Shuttle: (R) leg press 8b 2 x 20 reps; NE    Shuttle: (R) calf raises 2b 2 x 20 reps; NE    (B) Calf stretch on inclined step L4 3 reps x 30 secs ea; NE    Step up forward/straddle onto a 6 inch step with redTB abduction resist (R/L) LE lead x 20 reps ea; NE     Monster walk fwd/bkwd with greenTB abduction resist 3 laps x 30 feet; NE     (R) Ankle DF mobs/stretch with wedge x 10 reps; P, NW    Therapeutic Activity Fwd Step ups onto 4-6 inch step with (B) UE support b/w // bars leading with (R/L) LE 2 sets x 10 reps ea; NE.          Gait Training Gait training without boot 2 laps x 30 feet; NE (min restriction noted) Ambulation in department 4 laps x 30 feet with cueing to promote heel-toe and push-off and equalize step length on (R) LE       Therapeutic Exercise Min 45 43 47    Gait Training Min  10     Total of Timed Procedures 45 53 47    Total of Service Based 0 0 0    Total Treatment Time 45 56 48           3/7/2025 3/12/2025 3/14/2025 3/18/2025   LE Treatment   Treatment Day 8 9 10 11   Therapeutic Exercise    TM: forward gait @ 0% grade x 1.5-1.7 mph x 10 mins; NE    Standing: (R) Ankle DF mob with towel roll x 10 reps; P, NW stretch/ache calf    + DF mob with wedge x 10 reps; P, NW stretch/ache higher on calf    - re-test motion: less stretch/ache (R) ankle/calf     (R) Ankle DF with IV mobs with towel roll 2 x 10 reps; P, NW (better)    - re-test motion: no ache less stretch (R) ankle/calf    (R) Ankle DF with IV mobs with wedge x 10 reps; P, NW (better)    - re-test motion: almost no stretch symptom    + 10 more mobs with IV x 10 reps; NE    Seated: (R) Ankle eccentric DF  load with blackTB 2 x 10 reps x 10 eccen ct ea; NE        Seated: (R) Ankle eccentric IV load with greenTB 2 x 10 reps x 10 eccen ct ea; NE    Seated: (R) Hallux PF with blackTB 2 x 20 reps; NE    Standing (R) LE BAPS L4 DF-PF, IV-EV, CW, CCW x 20 reps; NE    SLS on bluefoam 3 x 30 secs    + 1 kg ball catch/throw x 10 reps; NE    (R) Ankle DF with IV mobs using wedge x 10 reps; NE       Therapeutic Exercise Minutes    47   Total Time Of Timed Procedures    47   Total Time Of Service-Based Procedures    0   Total Treatment Time    47   HEP    Standing: (R) Ankle DF with IV mobs with wedge x 10-20 reps x 2-3x/day  + (R) Ankle eccentric DF and IV with black and greenTB 10 reps x 10 eccen strengthening x 1-2x/day        HEP  Standing: (R) Ankle DF with IV mobs with wedge x 10-20 reps x 2-3x/day  + (R) Ankle eccentric DF and IV with black and greenTB 10 reps x 10 eccen strengthening x 1-2x/day    Charges     TherEx x 3

## 2025-03-19 ENCOUNTER — OFFICE VISIT (OUTPATIENT)
Dept: ORTHOPEDICS CLINIC | Facility: CLINIC | Age: 50
End: 2025-03-19
Payer: COMMERCIAL

## 2025-03-19 DIAGNOSIS — M67.873 ACHILLES TENDINOSIS OF RIGHT ANKLE: Primary | ICD-10-CM

## 2025-03-19 DIAGNOSIS — M76.61 ACHILLES TENDINITIS OF RIGHT LOWER EXTREMITY: ICD-10-CM

## 2025-03-19 PROCEDURE — 99024 POSTOP FOLLOW-UP VISIT: CPT | Performed by: ORTHOPAEDIC SURGERY

## 2025-03-19 NOTE — PROGRESS NOTES
Chief Complaint   Patient presents with    Follow - Up     Right ankle sx done on 1/2. Patient denies any pain at this time.      HPI  Pt hasn't needed pain meds for a long time. He gets heel pain when he stands on his foot for longer periods. He notices some swelling after being up on his feet for a long time.    Physical Exam  Gen: NAD, alert, answering questions appropriately  HEENT: NCAT, EOMI  Lungs: NL breathing, symmetrical lung expansion  Abd: Soft, ND  Extremities:   Right Ankle with neutral alignment, DF/PF/EHL intact, SILT, cap refill brisk, incision healing well.  Moderate swelling present.  Painless ankle range of motion.  Active ankle plantarflexion against resistance      Assessment/Plan: 49 year old year old male presenting status post insertional Achilles tendon repair on 1/2/2025.  1. Achilles tendinosis of right ankle  May begin weightbearing as tolerated in a regular shoe.  Use the boot as needed for resting the ankle and Achilles.    2. Achilles tendinitis of right lower extremity  Continue stretching and strengthening exercises    Return in about 8 weeks (around 5/14/2025) for R ankle.      Karis Beasley,   03/19/25

## 2025-03-21 ENCOUNTER — OFFICE VISIT (OUTPATIENT)
Dept: PHYSICAL THERAPY | Age: 50
End: 2025-03-21
Attending: ORTHOPAEDIC SURGERY
Payer: COMMERCIAL

## 2025-03-21 PROCEDURE — 97110 THERAPEUTIC EXERCISES: CPT | Performed by: PHYSICAL THERAPIST

## 2025-03-21 NOTE — PROGRESS NOTES
Patient: Sonny Baker (49 year old, male) Referring Provider:  Insurance:   Diagnosis: Achilles tendinosis of right ankle (M67.873) Karis GIANG   Date of Surgery: 1/2/2025 Next MD visit:  N/A   Precautions:  Other (use comment) ((R) LE WBAT (using walking boot)) 3/19/2025 Referral Information:    Date of Evaluation: Req: 0, Auth: 0, Exp:     02/12/25 POC Auth Visits:  12       Today's Date   3/21/2025    Subjective  Vinay state that he saw the MD the other day and told that he is doing good in the (R) Ankle.  She provided Vinay with added PT sessions (8 visits).  Vinay continue to do his HEP and he has been ambulating without an immobilizer.  He walked a lot yesterday at work and his (R) foot got tired but felt better after resting.       Pain: 0/10     Objective  (R) Ankle/Foot WFL all motions with 4-/5 MMT.       Assessment  Vinay cotinue to progress with (R) LE/Ankle strengthening and stability exercises.  He felt minimal strain/tightness during higher level exercises but no worse as a result.  Vinay still has tightness in the (R) ankle/leg upon getting up in the morning but loosens up after 5 mins of walking. He is to continue with his HEP 1-2x/day.    Goals (to be met in 12 visits)   1. Patient to consistently perform their HEP and it's progression to maintain their improved condition.  2. Patient to have reduced and abolished symptoms to to be able to place full weight, walk, climb up/down stairs, run, and drive without producing symptoms in the (R) heel/foot.   3. Patient to have WNL of (R) Ankle/Foot AROM in all directions with 4-5/5 MMT in all motions to promote all home, work, recreational, and functional activities without discomfort or deviation.       Plan  Re-assess next session and continue with directional preference exercise, stretching, strengthening/stability exercises, mobilization, balance/proprioceptive exercises, gait training, patient education, and HEP progression.    Treatment  Last 4 Visits       3/12/2025 3/14/2025 3/18/2025 3/21/2025   PT Treatment   Treatment Day 9 10 11 12   Therapeutic Exercise NuStep LE only L1 S9-5 x 10 mins; NE pull/stretch (R) calf    (B) LE calf stretch on inclined step L3 3 x 30 secs ea; NE stretch     Standing: (R) Ankle DF mobs with towel at balls of feet x 20 reps; NE stretch calf    Standing: BAPS (R) LE L4 PF-DF, IV-EV, CW, CCW x 20-10 reps ea; NE    + SLStance on BAPS x 5 reps ea; NE    Seated: (R) Ankle PF, EV, IV, DF with blackTB x 20 reps; NE     Hydrant (L/R) LE 1 kg ball 5 reps x 10 cts ea; NE    Shuttle: (R) leg press 8b x 20 reps; NE    Shuttle: (R) calf raises 2b 2 x 10 reps; NE    (R) SLStance on bluefoam with (L) foot foam roll tap 2 x 10 reps; NE pull      Monster walk fwd/bkwd with redTB abduction resist 3 laps x 30 feet; NE    (R) Ankle DF mob/stretch with towel roll under balls of foot x 20 reps; NE stretch NuStep LE only L1-4-1 x 10 mins; NE    (R) Ankle/Foot DF mob/stretch with towel roll under balls of foot x 10 reps; P, NW    (R) Ankle DF eccentric strengthening with blackTB 20 reps x 10 eccen ct ea; NE     (R) Hallux PF with blackTB 2 x 20 reps; NE    Standing: (R) LE ((B) feet on board) BAPS L4 PF-DF, EV-IV, CW, CCW x 20 reps ea; NE    Shuttle: (R) leg press 8b 2 x 20 reps; NE    Shuttle: (R) calf raises 2b 2 x 20 reps; NE    (B) Calf stretch on inclined step L4 3 reps x 30 secs ea; NE    Step up forward/straddle onto a 6 inch step with redTB abduction resist (R/L) LE lead x 20 reps ea; NE     Monster walk fwd/bkwd with greenTB abduction resist 3 laps x 30 feet; NE     (R) Ankle DF mobs/stretch with wedge x 10 reps; P, NW     Gait Training Ambulation in department 4 laps x 30 feet with cueing to promote heel-toe and push-off and equalize step length on (R) LE        Therapeutic Exercise Min 43 47     Gait Training Min 10      Total of Timed Procedures 53 47     Total of Service Based 0 0     Total Treatment Time 56 48             3/12/2025 3/14/2025 3/18/2025 3/21/2025   LE Treatment   Treatment Day 9 10 11 12   Therapeutic Exercise   TM: forward gait @ 0% grade x 1.5-1.7 mph x 10 mins; NE    Standing: (R) Ankle DF mob with towel roll x 10 reps; P, NW stretch/ache calf    + DF mob with wedge x 10 reps; P, NW stretch/ache higher on calf    - re-test motion: less stretch/ache (R) ankle/calf     (R) Ankle DF with IV mobs with towel roll 2 x 10 reps; P, NW (better)    - re-test motion: no ache less stretch (R) ankle/calf    (R) Ankle DF with IV mobs with wedge x 10 reps; P, NW (better)    - re-test motion: almost no stretch symptom    + 10 more mobs with IV x 10 reps; NE    Seated: (R) Ankle eccentric DF load with blackTB 2 x 10 reps x 10 eccen ct ea; NE        Seated: (R) Ankle eccentric IV load with greenTB 2 x 10 reps x 10 eccen ct ea; NE    Seated: (R) Hallux PF with blackTB 2 x 20 reps; NE    Standing (R) LE BAPS L4 DF-PF, IV-EV, CW, CCW x 20 reps; NE    SLS on bluefoam 3 x 30 secs    + 1 kg ball catch/throw x 10 reps; NE    (R) Ankle DF with IV mobs using wedge x 10 reps; NE     TM: Fwd walk @ 0% grade x 1.5-1.8 mph x 5 mins; NE    TM: Retro walk @ 5% grade x 1.0 mph x 10 mins; NE    (R) Ankle DF with IV mobs with wedge x 10 mobs ea; P, NW      Seated: (R) Ankle DF eccentric strengthening with blackTB 20 reps x 10 eccen ct ea; NE tired    Seated: (R) Ankle IV eccentric strengthening with greenTB 20 reps x 10 eccen ct ea; NE tired    Step up forward/straddle onto an 8 inch step with a blueTB abduction resist (R/L) LE x 20 reps ea; NE  tired    (R) LE lateral eccentric step down hang from a 6 inch step 10 reps x 5 eccen ct ea; NE    (R) LE forward eccentric step down hang from a 4 inch step 10 reps x 5 eccen ct ea; P, NW (R) achilles/heel strain/stretch    Hydrant (R/L) LE 2 kg ball 5 reps x 10 cts ea; NE     Shuttle: (R) LE calf raises 3b x 20+10 reps; P, NW ache/strain     (B) Calf stretch on inclined step L4 3 reps x 30 secs; P, NW  stretch/ache    // bars (R) LE BAPS L4 fwd-bkwd, EV-IV, CW, CCW x 20 reps ea; NE     (R) Ankle DF with IV mobs using x 10 reps; NE   Therapeutic Exercise Minutes   47 51   Total Time Of Timed Procedures   47 51   Total Time Of Service-Based Procedures   0 0   Total Treatment Time   47 52   HEP   Standing: (R) Ankle DF with IV mobs with wedge x 10-20 reps x 2-3x/day  + (R) Ankle eccentric DF and IV with black and greenTB 10 reps x 10 eccen strengthening x 1-2x/day (R) Ankle DF with IV mobs with wedge x 10 mobs each x 1-2x/day    Seated: (R) Ankle DF and IV eccentric strengthening with blackTB 20 reps x 10 eccen ct each x 1-2x/day        HEP  (R) Ankle DF with IV mobs with wedge x 10 mobs each x 1-2x/day    Seated: (R) Ankle DF and IV eccentric strengthening with blackTB 20 reps x 10 eccen ct each x 1-2x/day    Charges     TherEx x 3

## 2025-03-25 ENCOUNTER — OFFICE VISIT (OUTPATIENT)
Dept: PHYSICAL THERAPY | Age: 50
End: 2025-03-25
Attending: ORTHOPAEDIC SURGERY
Payer: COMMERCIAL

## 2025-03-25 PROCEDURE — 97110 THERAPEUTIC EXERCISES: CPT | Performed by: PHYSICAL THERAPIST

## 2025-03-25 NOTE — PROGRESS NOTES
Patient: Sonny Baker (49 year old, male) Referring Provider:  Insurance:   Diagnosis: Achilles tendinosis of right ankle (M67.873) Karis GIANG   Date of Surgery: 1/2/2025 Next MD visit:  N/A   Precautions:  Other (use comment) ((R) LE WBAT (using walking boot)) 3/19/2025 Referral Information:    Date of Evaluation: Req: 0, Auth: 0, Exp:     02/12/25 POC Auth Visits:  20 (New Rx 3/19/2025 for 8 vists)       Today's Date   3/25/2025    Subjective  Vinay feels good today.  He still get tightness in the (R) calf/heel in the morning but it is getting better.  He was able to walk outside for about a mile and the (R) foot/ankle did not hurt but he felt tired in the (L) lower calf muscle.  He continue to do his HEP regularly.       Pain:       Objective  Normal ambulation with slow pace.  Equal step length with present heel-toe and push-off pattern. No immobilizer and no limp.       Assessment  Vinay was progressed today with increased resistance for (R) gastroc-soleus strengthening.  He felt an ache/tightness at the heel and bottom of the calf but no worse as a result.  Minimal cueing needed to correct his alignment and exercise technique.  All questions were answered at this time.    Goals (to be met in 12 visits)   1. Patient to consistently perform their HEP and it's progression to maintain their improved condition.  2. Patient to have reduced and abolished symptoms to to be able to place full weight, walk, climb up/down stairs, run, and drive without producing symptoms in the (R) heel/foot.   3. Patient to have WNL of (R) Ankle/Foot AROM in all directions with 4-5/5 MMT in all motions to promote all home, work, recreational, and functional activities without discomfort or deviation.       Plan  Re-assess next session and continue with directional preference exercise, stretching, strengthening/stability exercises, mobilization, balance/proprioceptive exercises, gait training, patient education, and HEP  progression.    Treatment Last 4 Visits       3/12/2025 3/14/2025 3/18/2025 3/21/2025   PT Treatment   Treatment Day 9 10 11 12   Therapeutic Exercise NuStep LE only L1 S9-5 x 10 mins; NE pull/stretch (R) calf    (B) LE calf stretch on inclined step L3 3 x 30 secs ea; NE stretch     Standing: (R) Ankle DF mobs with towel at balls of feet x 20 reps; NE stretch calf    Standing: BAPS (R) LE L4 PF-DF, IV-EV, CW, CCW x 20-10 reps ea; NE    + SLStance on BAPS x 5 reps ea; NE    Seated: (R) Ankle PF, EV, IV, DF with blackTB x 20 reps; NE     Hydrant (L/R) LE 1 kg ball 5 reps x 10 cts ea; NE    Shuttle: (R) leg press 8b x 20 reps; NE    Shuttle: (R) calf raises 2b 2 x 10 reps; NE    (R) SLStance on bluefoam with (L) foot foam roll tap 2 x 10 reps; NE pull      Monster walk fwd/bkwd with redTB abduction resist 3 laps x 30 feet; NE    (R) Ankle DF mob/stretch with towel roll under balls of foot x 20 reps; NE stretch NuStep LE only L1-4-1 x 10 mins; NE    (R) Ankle/Foot DF mob/stretch with towel roll under balls of foot x 10 reps; P, NW    (R) Ankle DF eccentric strengthening with blackTB 20 reps x 10 eccen ct ea; NE     (R) Hallux PF with blackTB 2 x 20 reps; NE    Standing: (R) LE ((B) feet on board) BAPS L4 PF-DF, EV-IV, CW, CCW x 20 reps ea; NE    Shuttle: (R) leg press 8b 2 x 20 reps; NE    Shuttle: (R) calf raises 2b 2 x 20 reps; NE    (B) Calf stretch on inclined step L4 3 reps x 30 secs ea; NE    Step up forward/straddle onto a 6 inch step with redTB abduction resist (R/L) LE lead x 20 reps ea; NE     Monster walk fwd/bkwd with greenTB abduction resist 3 laps x 30 feet; NE     (R) Ankle DF mobs/stretch with wedge x 10 reps; P, NW     Gait Training Ambulation in department 4 laps x 30 feet with cueing to promote heel-toe and push-off and equalize step length on (R) LE        Therapeutic Exercise Min 43 47     Gait Training Min 10      Total of Timed Procedures 53 47     Total of Service Based 0 0     Total Treatment  Time 56 48            3/12/2025 3/14/2025 3/18/2025 3/21/2025   LE Treatment   Treatment Day 9 10 11 12   Therapeutic Exercise   TM: forward gait @ 0% grade x 1.5-1.7 mph x 10 mins; NE    Standing: (R) Ankle DF mob with towel roll x 10 reps; P, NW stretch/ache calf    + DF mob with wedge x 10 reps; P, NW stretch/ache higher on calf    - re-test motion: less stretch/ache (R) ankle/calf     (R) Ankle DF with IV mobs with towel roll 2 x 10 reps; P, NW (better)    - re-test motion: no ache less stretch (R) ankle/calf    (R) Ankle DF with IV mobs with wedge x 10 reps; P, NW (better)    - re-test motion: almost no stretch symptom    + 10 more mobs with IV x 10 reps; NE    Seated: (R) Ankle eccentric DF load with blackTB 2 x 10 reps x 10 eccen ct ea; NE        Seated: (R) Ankle eccentric IV load with greenTB 2 x 10 reps x 10 eccen ct ea; NE    Seated: (R) Hallux PF with blackTB 2 x 20 reps; NE    Standing (R) LE BAPS L4 DF-PF, IV-EV, CW, CCW x 20 reps; NE    SLS on bluefoam 3 x 30 secs    + 1 kg ball catch/throw x 10 reps; NE    (R) Ankle DF with IV mobs using wedge x 10 reps; NE     TM: Fwd walk @ 0% grade x 1.5-1.8 mph x 5 mins; NE    TM: Retro walk @ 5% grade x 1.0 mph x 10 mins; NE    (R) Ankle DF with IV mobs with wedge x 10 mobs ea; P, NW      Seated: (R) Ankle DF eccentric strengthening with blackTB 20 reps x 10 eccen ct ea; NE tired    Seated: (R) Ankle IV eccentric strengthening with greenTB 20 reps x 10 eccen ct ea; NE tired    Step up forward/straddle onto an 8 inch step with a blueTB abduction resist (R/L) LE x 20 reps ea; NE  tired    (R) LE lateral eccentric step down hang from a 6 inch step 10 reps x 5 eccen ct ea; NE    (R) LE forward eccentric step down hang from a 4 inch step 10 reps x 5 eccen ct ea; P, NW (R) achilles/heel strain/stretch    Hydrant (R/L) LE 2 kg ball 5 reps x 10 cts ea; NE     Shuttle: (R) LE calf raises 3b x 20+10 reps; P, NW ache/strain     (B) Calf stretch on inclined step L4 3 reps x  30 secs; P, NW stretch/ache    // bars (R) LE BAPS L4 fwd-bkwd, EV-IV, CW, CCW x 20 reps ea; NE     (R) Ankle DF with IV mobs using x 10 reps; NE   Therapeutic Exercise Minutes   47 51   Total Time Of Timed Procedures   47 51   Total Time Of Service-Based Procedures   0 0   Total Treatment Time   47 52   HEP   Standing: (R) Ankle DF with IV mobs with wedge x 10-20 reps x 2-3x/day  + (R) Ankle eccentric DF and IV with black and greenTB 10 reps x 10 eccen strengthening x 1-2x/day (R) Ankle DF with IV mobs with wedge x 10 mobs each x 1-2x/day    Seated: (R) Ankle DF and IV eccentric strengthening with blackTB 20 reps x 10 eccen ct each x 1-2x/day        HEP  (R) Ankle DF with IV mobs with wedge x 10 mobs each x 1-2x/day    Seated: (R) Ankle DF and IV eccentric strengthening with blackTB 20 reps x 10 eccen ct each x 1-2x/day    Charges     TherEx x 3                           Plan  Re-assess next session and continue with directional preference exercise, stretching, strengthening/stability exercises, mobilization, balance/proprioceptive exercises, gait training, patient education, and HEP progression.    Treatment Last 4 Visits       3/14/2025 3/18/2025 3/21/2025 3/25/2025   PT Treatment   Treatment Day 10 11 12 13   Therapeutic Exercise NuStep LE only L1-4-1 x 10 mins; NE    (R) Ankle/Foot DF mob/stretch with towel roll under balls of foot x 10 reps; P, NW    (R) Ankle DF eccentric strengthening with blackTB 20 reps x 10 eccen ct ea; NE     (R) Hallux PF with blackTB 2 x 20 reps; NE    Standing: (R) LE ((B) feet on board) BAPS L4 PF-DF, EV-IV, CW, CCW x 20 reps ea; NE    Shuttle: (R) leg press 8b 2 x 20 reps; NE    Shuttle: (R) calf raises 2b 2 x 20 reps; NE    (B) Calf stretch on inclined step L4 3 reps x 30 secs ea; NE    Step up forward/straddle onto a 6 inch step with redTB abduction resist (R/L) LE lead x 20 reps ea; NE     Monster walk fwd/bkwd with greenTB abduction resist 3 laps x 30 feet; NE     (R) Ankle DF  mobs/stretch with wedge x 10 reps; P, NW      Therapeutic Exercise Min 47      Total of Timed Procedures 47      Total of Service Based 0      Total Treatment Time 48             3/14/2025 3/18/2025 3/21/2025 3/25/2025   LE Treatment   Treatment Day 10 11 12 13   Therapeutic Exercise  TM: forward gait @ 0% grade x 1.5-1.7 mph x 10 mins; NE    Standing: (R) Ankle DF mob with towel roll x 10 reps; P, NW stretch/ache calf    + DF mob with wedge x 10 reps; P, NW stretch/ache higher on calf    - re-test motion: less stretch/ache (R) ankle/calf     (R) Ankle DF with IV mobs with towel roll 2 x 10 reps; P, NW (better)    - re-test motion: no ache less stretch (R) ankle/calf    (R) Ankle DF with IV mobs with wedge x 10 reps; P, NW (better)    - re-test motion: almost no stretch symptom    + 10 more mobs with IV x 10 reps; NE    Seated: (R) Ankle eccentric DF load with blackTB 2 x 10 reps x 10 eccen ct ea; NE        Seated: (R) Ankle eccentric IV load with greenTB 2 x 10 reps x 10 eccen ct ea; NE    Seated: (R) Hallux PF with blackTB 2 x 20 reps; NE    Standing (R) LE BAPS L4 DF-PF, IV-EV, CW, CCW x 20 reps; NE    SLS on bluefoam 3 x 30 secs    + 1 kg ball catch/throw x 10 reps; NE    (R) Ankle DF with IV mobs using wedge x 10 reps; NE     TM: Fwd walk @ 0% grade x 1.5-1.8 mph x 5 mins; NE    TM: Retro walk @ 5% grade x 1.0 mph x 10 mins; NE    (R) Ankle DF with IV mobs with wedge x 10 mobs ea; P, NW      Seated: (R) Ankle DF eccentric strengthening with blackTB 20 reps x 10 eccen ct ea; NE tired    Seated: (R) Ankle IV eccentric strengthening with greenTB 20 reps x 10 eccen ct ea; NE tired    Step up forward/straddle onto an 8 inch step with a blueTB abduction resist (R/L) LE x 20 reps ea; NE  tired    (R) LE lateral eccentric step down hang from a 6 inch step 10 reps x 5 eccen ct ea; NE    (R) LE forward eccentric step down hang from a 4 inch step 10 reps x 5 eccen ct ea; P, NW (R) achilles/heel strain/stretch    Hydrant  (R/L) LE 2 kg ball 5 reps x 10 cts ea; NE     Shuttle: (R) LE calf raises 3b x 20+10 reps; P, NW ache/strain     (B) Calf stretch on inclined step L4 3 reps x 30 secs; P, NW stretch/ache    // bars (R) LE BAPS L4 fwd-bkwd, EV-IV, CW, CCW x 20 reps ea; NE     (R) Ankle DF with IV mobs using x 10 reps; NE TM: Retro walk @ 8% grade x 1.2 mph x 10 mins; NE    (B) Calf stretch on inclined step L4 x 3 reps x 30 secs ea; NE     (R) Ankle DF with IV mobs using a wedge x 10 reps; NE    Seated: (R) Calf soleus strengthening toes/balls on 1 inch step x 10 lbs x 3 sets x 20 reps ea; NE     Step standing (R) LE on 4 inch step leaning on wall 2 x 5 reps; P, NW (R) lower achilles and heel    Standing: (R) SLStance on BAPS board L5 PF-DF, IV-EV, CW, CCW x 20 reps ea; NE     Shuttle: (R) leg press 8b with toe up x 20 +18 reps; NE tired hamstrings    Shuttle: (R) Calf raises 4b 2 x 10 reps; NE tired calf    Shuttle: (R) LE plyojump straight 2b 2 x 10 resp; NE    Step up forward/straddle step onto a 6 inch step with blueTB abduction resist (R/L) LE lead x 20 reps ea; NE tired    (R) Ankle DF with IV mobs with wedge x 10 mobs; NE       Therapeutic Exercise Minutes  47 51 46   Total Time Of Timed Procedures  47 51 46   Total Time Of Service-Based Procedures  0 0 0   Total Treatment Time  47 52 47   HEP  Standing: (R) Ankle DF with IV mobs with wedge x 10-20 reps x 2-3x/day  + (R) Ankle eccentric DF and IV with black and greenTB 10 reps x 10 eccen strengthening x 1-2x/day (R) Ankle DF with IV mobs with wedge x 10 mobs each x 1-2x/day    Seated: (R) Ankle DF and IV eccentric strengthening with blackTB 20 reps x 10 eccen ct each x 1-2x/day (R) Ankle DF with IV mobs with towel roll/pad x 10-20 mobs x 1-2x/day  Seated: (R) Ankle DF eccentric load with blackTB 2 x 20 reps x 10 eccen ct each x 1-2x/day        HEP  (R) Ankle DF with IV mobs with towel roll/pad x 10-20 mobs x 1-2x/day  Seated: (R) Ankle DF eccentric load with blackTB 2 x 20 reps  x 10 eccen ct each x 1-2x/day    Charges     TherEx x 3

## 2025-03-28 ENCOUNTER — OFFICE VISIT (OUTPATIENT)
Dept: PHYSICAL THERAPY | Age: 50
End: 2025-03-28
Attending: ORTHOPAEDIC SURGERY
Payer: COMMERCIAL

## 2025-03-28 PROCEDURE — 97110 THERAPEUTIC EXERCISES: CPT | Performed by: PHYSICAL THERAPIST

## 2025-03-28 NOTE — PROGRESS NOTES
Patient: Sonny Baker (49 year old, male) Referring Provider:  Insurance:   Diagnosis: Achilles tendinosis of right ankle (M67.873) Karis Beasley  JACMAZIN   Date of Surgery: 1/2/2025 Next MD visit:  N/A   Precautions:  Other (use comment) ((R) LE WBAT (using walking boot)) 3/19/2025 Referral Information:    Date of Evaluation: Req: 0, Auth: 0, Exp:     02/12/25 POC Auth Visits:  20       Today's Date   3/28/2025    Subjective  Rich report no ache/pain in the (R) heel/calf but a tightness in the lower part of the achilles tendon.  He continue to do his exercises at home regularly.       Pain: 0/10     Objective  (R) Ankle ROM: DF: Minimal loss, NE tight,  PF: Nil loss; NE weak,  IV: Nil loss; NE,  EV: Nil loss; NE weak       Assessment  Vinay still has weakness of (R) ankle PF and EV motions but is slowly progressing with strengthening exercises.  He has WFL of (R) Ankle AROM in all directions.  Minimal cueing needed to maintaing good alignment and proper technique.  All questions were answered at this time.    Goals (to be met in 20 visits)     Treatment Last 4 Visits  Treatment Day: 14       3/18/2025 3/21/2025 3/25/2025 3/28/2025   LE Treatment   Therapeutic Exercise TM: forward gait @ 0% grade x 1.5-1.7 mph x 10 mins; NE    Standing: (R) Ankle DF mob with towel roll x 10 reps; P, NW stretch/ache calf    + DF mob with wedge x 10 reps; P, NW stretch/ache higher on calf    - re-test motion: less stretch/ache (R) ankle/calf     (R) Ankle DF with IV mobs with towel roll 2 x 10 reps; P, NW (better)    - re-test motion: no ache less stretch (R) ankle/calf    (R) Ankle DF with IV mobs with wedge x 10 reps; P, NW (better)    - re-test motion: almost no stretch symptom    + 10 more mobs with IV x 10 reps; NE    Seated: (R) Ankle eccentric DF load with blackTB 2 x 10 reps x 10 eccen ct ea; NE        Seated: (R) Ankle eccentric IV load with greenTB 2 x 10 reps x 10 eccen ct ea; NE    Seated: (R) Hallux PF with blackTB 2  x 20 reps; NE    Standing (R) LE BAPS L4 DF-PF, IV-EV, CW, CCW x 20 reps; NE    SLS on bluefoam 3 x 30 secs    + 1 kg ball catch/throw x 10 reps; NE    (R) Ankle DF with IV mobs using wedge x 10 reps; NE     TM: Fwd walk @ 0% grade x 1.5-1.8 mph x 5 mins; NE    TM: Retro walk @ 5% grade x 1.0 mph x 10 mins; NE    (R) Ankle DF with IV mobs with wedge x 10 mobs ea; P, NW      Seated: (R) Ankle DF eccentric strengthening with blackTB 20 reps x 10 eccen ct ea; NE tired    Seated: (R) Ankle IV eccentric strengthening with greenTB 20 reps x 10 eccen ct ea; NE tired    Step up forward/straddle onto an 8 inch step with a blueTB abduction resist (R/L) LE x 20 reps ea; NE  tired    (R) LE lateral eccentric step down hang from a 6 inch step 10 reps x 5 eccen ct ea; NE    (R) LE forward eccentric step down hang from a 4 inch step 10 reps x 5 eccen ct ea; P, NW (R) achilles/heel strain/stretch    Hydrant (R/L) LE 2 kg ball 5 reps x 10 cts ea; NE     Shuttle: (R) LE calf raises 3b x 20+10 reps; P, NW ache/strain     (B) Calf stretch on inclined step L4 3 reps x 30 secs; P, NW stretch/ache    // bars (R) LE BAPS L4 fwd-bkwd, EV-IV, CW, CCW x 20 reps ea; NE     (R) Ankle DF with IV mobs using x 10 reps; NE TM: Retro walk @ 8% grade x 1.2 mph x 10 mins; NE    (B) Calf stretch on inclined step L4 x 3 reps x 30 secs ea; NE     (R) Ankle DF with IV mobs using a wedge x 10 reps; NE    Seated: (R) Calf soleus strengthening toes/balls on 1 inch step x 10 lbs x 3 sets x 20 reps ea; NE     Step standing (R) LE on 4 inch step leaning on wall 2 x 5 reps; P, NW (R) lower achilles and heel    Standing: (R) SLStance on BAPS board L5 PF-DF, IV-EV, CW, CCW x 20 reps ea; NE     Shuttle: (R) leg press 8b with toe up x 20 +18 reps; NE tired hamstrings    Shuttle: (R) Calf raises 4b 2 x 10 reps; NE tired calf    Shuttle: (R) LE plyojump straight 2b 2 x 10 resp; NE    Step up forward/straddle step onto a 6 inch step with blueTB abduction resist (R/L)  LE lead x 20 reps ea; NE tired    (R) Ankle DF with IV mobs with wedge x 10 mobs; NE     TM: Retro walk @ 5% grade x 1.2 mph x 7 mins; NE     - forward walk @ 0% grade x 1.8-2.0 mph x 10 mins; P, NW (R) heel (better after (R) ankle mobs toward DF with IV x 20 mobs)    Step standing leaning on wall (R) foot on 4 inch step x 10 + 7 reps x 10 eccen ct ea; NE tired    Unable to do (R) SLStance with medial rotate    Hydrant: (R/L) LE 2 kg ball 5 reps x 10 cts ea; NE     Monster walk fwd/bkwd with green-blueTB 5 laps x 30 feet; NE tired    Lateral shuffle with greenTB 3 laps x 15 feet; NE tired    Shuttle: (R) Calf raises 4b x 20+17 reps; NE tired    Shuttle: (R) LE plyojump 2b straight and lateral x 20 reps ea; NE tired    (B) Calf raises holding on to rail 3-5 reps x 10 eccen ct ea; NE (home exercise)    (R) Ankle DF with IV mobs using a wedge x 10 reps; NE   Therapeutic Exercise Minutes 47 51 46 48   Total Time Of Timed Procedures 47 51 46 48   Total Time Of Service-Based Procedures 0 0 0 0   Total Treatment Time 47 52 47 49   HEP Standing: (R) Ankle DF with IV mobs with wedge x 10-20 reps x 2-3x/day  + (R) Ankle eccentric DF and IV with black and greenTB 10 reps x 10 eccen strengthening x 1-2x/day (R) Ankle DF with IV mobs with wedge x 10 mobs each x 1-2x/day    Seated: (R) Ankle DF and IV eccentric strengthening with blackTB 20 reps x 10 eccen ct each x 1-2x/day (R) Ankle DF with IV mobs with towel roll/pad x 10-20 mobs x 1-2x/day  Seated: (R) Ankle DF eccentric load with blackTB 2 x 20 reps x 10 eccen ct each x 1-2x/day (R) Ankle DF with IV mobs using a wedge x 10 reps x 2-3x/day  (R>L) Calf raises holding on to counter or table x 10 reps x 10 eccen ct ea x 2-3x/day        HEP  (R) Ankle DF with IV mobs using a wedge x 10 reps x 2-3x/day  (R>L) Calf raises holding on to counter or table x 10 reps x 10 eccen ct ea x 2-3x/day    Charges     TherEx x 3

## 2025-03-31 ENCOUNTER — APPOINTMENT (OUTPATIENT)
Dept: PHYSICAL THERAPY | Age: 50
End: 2025-03-31
Attending: ORTHOPAEDIC SURGERY
Payer: COMMERCIAL

## 2025-04-02 ENCOUNTER — APPOINTMENT (OUTPATIENT)
Dept: PHYSICAL THERAPY | Age: 50
End: 2025-04-02
Attending: ORTHOPAEDIC SURGERY
Payer: COMMERCIAL

## 2025-04-04 ENCOUNTER — APPOINTMENT (OUTPATIENT)
Dept: PHYSICAL THERAPY | Age: 50
End: 2025-04-04
Attending: ORTHOPAEDIC SURGERY
Payer: COMMERCIAL

## 2025-04-07 ENCOUNTER — OFFICE VISIT (OUTPATIENT)
Dept: PHYSICAL THERAPY | Age: 50
End: 2025-04-07
Attending: ORTHOPAEDIC SURGERY
Payer: COMMERCIAL

## 2025-04-07 PROCEDURE — 97110 THERAPEUTIC EXERCISES: CPT

## 2025-04-07 PROCEDURE — 97530 THERAPEUTIC ACTIVITIES: CPT

## 2025-04-07 PROCEDURE — 97112 NEUROMUSCULAR REEDUCATION: CPT

## 2025-04-07 NOTE — PROGRESS NOTES
Patient: Sonny Baker (49 year old, male) Referring Provider:  Insurance:   Diagnosis: Achilles tendinosis of right ankle (M67.873) Karis GIANG   Date of Surgery: 1/2/2025 Next MD visit:  N/A   Precautions:  Other (use comment) ((R) LE WBAT (using walking boot)) 3/19/2025 Referral Information:    Date of Evaluation: Req: 0, Auth: 0, Exp:     02/12/25 POC Auth Visits:  20       Today's Date   4/7/2025    Subjective  Vinay continues to report being symptom free in the (R) ankle/foot.  Once in a while he notes experiencing a tightness/stiffness in the (R) ankle with the first steps out of bed, but as his functional activity increases than \"the ankle lossens up.\" He cited going to the [employer] over the weekend and being able to walk 6 miles without any exacerbation of pain, although he would note swelling that would go away after elevating his LE above heart level.       Pain: 0/10     Objective  (R) Ankle Test Motion 8 inch step: Up/down/back up: NE (just tight/stiff)         Assessment  Vinay continues to demonstrate improvements in his condition as he is able to handle the progression of his (R) ankle strength/stability exercises without any adverse effects.  He was also introduced to SL balance exercises in order to improve balance and ankle stability.  He maintains WNL of (R) ankle AROM in all planes of motion.  Cueing was provided with SL exercises in order to ensure proper techniqe was being practiced.  He was told to continue with his current HEP and verbalized understanding/agreement.  All questions were answered at this time.    Goals (to be met in 20 visits)   On track  1. Patient to consistently perform their HEP and it's progression to maintain their improved condition.  2. Patient to have reduced and abolished symptoms to to be able to place full weight, walk, climb up/down stairs, run, and drive without producing symptoms in the (R) heel/foot.   3. Patient to have WNL of (R) Ankle/Foot AROM  in all directions with 4-5/5 MMT in all motions to promote all home, work, recreational, and functional activities without discomfort or deviation.          Plan  Next session continue with SL balance/proprioception exercises to improve ankle stability.  Plan to re-assess next session and continue with directional preference exercise, stretching, strengthening/stability exercises, mobilization, balance/proprioceptive exercises, gait training, patient education, and HEP progression.    Treatment Last 4 Visits  Treatment Day: 15       3/21/2025 3/25/2025 3/28/2025 4/7/2025   LE Treatment   Therapeutic Exercise TM: Fwd walk @ 0% grade x 1.5-1.8 mph x 5 mins; NE    TM: Retro walk @ 5% grade x 1.0 mph x 10 mins; NE    (R) Ankle DF with IV mobs with wedge x 10 mobs ea; P, NW      Seated: (R) Ankle DF eccentric strengthening with blackTB 20 reps x 10 eccen ct ea; NE tired    Seated: (R) Ankle IV eccentric strengthening with greenTB 20 reps x 10 eccen ct ea; NE tired    Step up forward/straddle onto an 8 inch step with a blueTB abduction resist (R/L) LE x 20 reps ea; NE  tired    (R) LE lateral eccentric step down hang from a 6 inch step 10 reps x 5 eccen ct ea; NE    (R) LE forward eccentric step down hang from a 4 inch step 10 reps x 5 eccen ct ea; P, NW (R) achilles/heel strain/stretch    Hydrant (R/L) LE 2 kg ball 5 reps x 10 cts ea; NE     Shuttle: (R) LE calf raises 3b x 20+10 reps; P, NW ache/strain     (B) Calf stretch on inclined step L4 3 reps x 30 secs; P, NW stretch/ache    // bars (R) LE BAPS L4 fwd-bkwd, EV-IV, CW, CCW x 20 reps ea; NE     (R) Ankle DF with IV mobs using x 10 reps; NE TM: Retro walk @ 8% grade x 1.2 mph x 10 mins; NE    (B) Calf stretch on inclined step L4 x 3 reps x 30 secs ea; NE     (R) Ankle DF with IV mobs using a wedge x 10 reps; NE    Seated: (R) Calf soleus strengthening toes/balls on 1 inch step x 10 lbs x 3 sets x 20 reps ea; NE     Step standing (R) LE on 4 inch step leaning on wall  2 x 5 reps; P, NW (R) lower achilles and heel    Standing: (R) SLStance on BAPS board L5 PF-DF, IV-EV, CW, CCW x 20 reps ea; NE     Shuttle: (R) leg press 8b with toe up x 20 +18 reps; NE tired hamstrings    Shuttle: (R) Calf raises 4b 2 x 10 reps; NE tired calf    Shuttle: (R) LE plyojump straight 2b 2 x 10 resp; NE    Step up forward/straddle step onto a 6 inch step with blueTB abduction resist (R/L) LE lead x 20 reps ea; NE tired    (R) Ankle DF with IV mobs with wedge x 10 mobs; NE     TM: Retro walk @ 5% grade x 1.2 mph x 7 mins; NE     - forward walk @ 0% grade x 1.8-2.0 mph x 10 mins; P, NW (R) heel (better after (R) ankle mobs toward DF with IV x 20 mobs)    Step standing leaning on wall (R) foot on 4 inch step x 10 + 7 reps x 10 eccen ct ea; NE tired    Unable to do (R) SLStance with medial rotate    Hydrant: (R/L) LE 2 kg ball 5 reps x 10 cts ea; NE     Monster walk fwd/bkwd with green-blueTB 5 laps x 30 feet; NE tired    Lateral shuffle with greenTB 3 laps x 15 feet; NE tired    Shuttle: (R) Calf raises 4b x 20+17 reps; NE tired    Shuttle: (R) LE plyojump 2b straight and lateral x 20 reps ea; NE tired    (B) Calf raises holding on to rail 3-5 reps x 10 eccen ct ea; NE (home exercise)    (R) Ankle DF with IV mobs using a wedge x 10 reps; NE TM: Retro walk @ 8% grade x 1.2 mph x 10 mins; NE     (B) Calf stretch (gastroc/soleus) on inclined step L4. 3 reps x 30 secs ea; NE     (R) Ankle DF with IV mobs using a wedge x 10 bounces; NE (cued for proper technique)    Step standing (R) LE on 4 inch step leaning on wall 2 sets x 10 reps x 10 eccen cts; NE     Shuttle: (R) leg press 8b with toe up. 20 reps x 5 eccen cts; NE    Shuttle: (R) LE plyojump straight and lateral 2b 2 sets x 10 reps ea; NE     (R) Ankle DF with IV mobs with wedge x 20 mobs; NE      Neuro Re-Education    (R) SL Wobble Board Fwd/Bkwd Taps x 20 reps; NE    (R) Step up onto blue foam and tap red bolster with (L) LE x 20 reps; NE tired    (R)  SL Stance on Blue Foam. 3 sets x 30 cts; NE (ankle instability noted)   Therapeutic Activity    Step up forward step onto and off of a 8 inch step with blueTB abduction resist (R) LE lead x 20 reps ea; NE    Straddle step onto 8 inch step with blueTB abduction resist (R) LE lead x 20 reps ea; NE   Therapeutic Exercise Minutes 51 46 48 25   Neuro Re-Educ Minutes    10   Therapeutic Activity Minutes    15   Total Time Of Timed Procedures 51 46 48 50   Total Time Of Service-Based Procedures 0 0 0 0   Total Treatment Time 52 47 49 50   HEP (R) Ankle DF with IV mobs with wedge x 10 mobs each x 1-2x/day    Seated: (R) Ankle DF and IV eccentric strengthening with blackTB 20 reps x 10 eccen ct each x 1-2x/day (R) Ankle DF with IV mobs with towel roll/pad x 10-20 mobs x 1-2x/day  Seated: (R) Ankle DF eccentric load with blackTB 2 x 20 reps x 10 eccen ct each x 1-2x/day (R) Ankle DF with IV mobs using a wedge x 10 reps x 2-3x/day  (R>L) Calf raises holding on to counter or table x 10 reps x 10 eccen ct ea x 2-3x/day (R) Ankle DF with IV mobs using a wedge x 10 reps x 2-3x/day   (R>L) Calf raises holding on to counter or table x 10 reps x 10 eccen ct ea x 2-3x/day           HEP  (R) Ankle DF with IV mobs using a wedge x 10 reps x 2-3x/day   (R>L) Calf raises holding on to counter or table x 10 reps x 10 eccen ct ea x 2-3x/day       Charges     Therapy x 2, Ther Act x 1, NeuroReEd x 1    On this date patient was seen by Jose Castro PTA under the supervision of Koko Medley PT. Cert MDT.

## 2025-04-11 ENCOUNTER — OFFICE VISIT (OUTPATIENT)
Dept: PHYSICAL THERAPY | Age: 50
End: 2025-04-11
Attending: ORTHOPAEDIC SURGERY
Payer: COMMERCIAL

## 2025-04-11 PROCEDURE — 97110 THERAPEUTIC EXERCISES: CPT

## 2025-04-11 PROCEDURE — 97112 NEUROMUSCULAR REEDUCATION: CPT

## 2025-04-11 PROCEDURE — 97530 THERAPEUTIC ACTIVITIES: CPT

## 2025-04-11 NOTE — PROGRESS NOTES
Patient: Sonny Baker (49 year old, male) Referring Provider:  Insurance:   Diagnosis: Achilles tendinosis of right ankle (M67.873) Karis GIANG   Date of Surgery: 1/2/2025 Next MD visit:  N/A   Precautions:  Other (use comment) ((R) LE WBAT (using walking boot)) 3/19/2025 Referral Information:    Date of Evaluation: Req: 0, Auth: 0, Exp:     02/12/25 POC Auth Visits:  20       Today's Date   4/11/2025    Subjective  Vinay continues to report being symptom free in the (R) ankle/foot. He continues to note minimal stiffness/tightness in the (R) ankle with the initial steps out of bed.  He reports being able to perform all home/work activities without pain/difficulty.  He says that he is doing his HEP 2-3x/day.       Pain: 0/10     Objective  (R) Ankle Test Motion Gait: No Pain       Assessment  Vinay maintains WNL of (R) ankle AROM without symptom production in all planes of motion.  He continues to demonstrate improvements in his condition as he is able to handle the progression of his (R) ankle strength/stability exercises without any adverse effects.  He was also introduced to anterior step downs. SL balance exercises in order to improve balance and ankle stability. Cueing was provided during lateral step down for proper knee/toe alignment.  He was told to continue with his current HEP and verbalized understanding/agreement.  All questions were answered at this time.    Goals (to be met in 20 visits)   On track  1. Patient to consistently perform their HEP and it's progression to maintain their improved condition.  2. Patient to have reduced and abolished symptoms to to be able to place full weight, walk, climb up/down stairs, run, and drive without producing symptoms in the (R) heel/foot.   3. Patient to have WNL of (R) Ankle/Foot AROM in all directions with 4-5/5 MMT in all motions to promote all home, work, recreational, and functional activities without discomfort or deviation.          Plan  Next  session continue with SL balance/proprioception exercises to improve ankle stability.  Plan to re-assess next session and continue with directional preference exercise, stretching, strengthening/stability exercises, mobilization, balance/proprioceptive exercises, gait training, patient education, and HEP progression.    Treatment Last 4 Visits  Treatment Day: 16       3/25/2025 3/28/2025 4/7/2025 4/11/2025   LE Treatment   Therapeutic Exercise TM: Retro walk @ 8% grade x 1.2 mph x 10 mins; NE    (B) Calf stretch on inclined step L4 x 3 reps x 30 secs ea; NE     (R) Ankle DF with IV mobs using a wedge x 10 reps; NE    Seated: (R) Calf soleus strengthening toes/balls on 1 inch step x 10 lbs x 3 sets x 20 reps ea; NE     Step standing (R) LE on 4 inch step leaning on wall 2 x 5 reps; P, NW (R) lower achilles and heel    Standing: (R) SLStance on BAPS board L5 PF-DF, IV-EV, CW, CCW x 20 reps ea; NE     Shuttle: (R) leg press 8b with toe up x 20 +18 reps; NE tired hamstrings    Shuttle: (R) Calf raises 4b 2 x 10 reps; NE tired calf    Shuttle: (R) LE plyojump straight 2b 2 x 10 resp; NE    Step up forward/straddle step onto a 6 inch step with blueTB abduction resist (R/L) LE lead x 20 reps ea; NE tired    (R) Ankle DF with IV mobs with wedge x 10 mobs; NE     TM: Retro walk @ 5% grade x 1.2 mph x 7 mins; NE     - forward walk @ 0% grade x 1.8-2.0 mph x 10 mins; P, NW (R) heel (better after (R) ankle mobs toward DF with IV x 20 mobs)    Step standing leaning on wall (R) foot on 4 inch step x 10 + 7 reps x 10 eccen ct ea; NE tired    Unable to do (R) SLStance with medial rotate    Hydrant: (R/L) LE 2 kg ball 5 reps x 10 cts ea; NE     Monster walk fwd/bkwd with green-blueTB 5 laps x 30 feet; NE tired    Lateral shuffle with greenTB 3 laps x 15 feet; NE tired    Shuttle: (R) Calf raises 4b x 20+17 reps; NE tired    Shuttle: (R) LE plyojump 2b straight and lateral x 20 reps ea; NE tired    (B) Calf raises holding on to rail  3-5 reps x 10 eccen ct ea; NE (home exercise)    (R) Ankle DF with IV mobs using a wedge x 10 reps; NE TM: Retro walk @ 8% grade x 1.2 mph x 10 mins; NE     (B) Calf stretch (gastroc/soleus) on inclined step L4. 3 reps x 30 secs ea; NE     (R) Ankle DF with IV mobs using a wedge x 10 bounces; NE (cued for proper technique)    Step standing (R) LE on 4 inch step leaning on wall 2 sets x 10 reps x 10 eccen cts; NE     Shuttle: (R) leg press 8b with toe up. 20 reps x 5 eccen cts; NE    Shuttle: (R) LE plyojump straight and lateral 2b 2 sets x 10 reps ea; NE     (R) Ankle DF with IV mobs with wedge x 20 mobs; NE    TM: Fwd Walking 1.2 - 2.5 mph x 10 min; NE    Ankle DF w/Inv mobs over wedges x 20 bounces; NE (stiff/tight)    (B) Heel Raises over 4 inch step 10 reps x 5 eccen cts; NE  + with lean to the right 10 reps x 5 eccen cts; NE tired    Ankle DF w/Inv mobs over wedges x 20 bounces; NE (stiff/tight)   Neuro Re-Education   (R) SL Wobble Board Fwd/Bkwd Taps x 20 reps; NE    (R) Step up onto blue foam and tap red bolster with (L) LE x 20 reps; NE tired    (R) SL Stance on Blue Foam. 3 sets x 30 cts; NE (ankle instability noted) Tandem walking b/w // bars 3 laps x 20 feet; NE    BAPS Board in // bars Lv 5. DF/PF-Inv/Ev-CW/CCW x 10 reps ea; NE    (L/R) Hydrants with red ball 1kg on blue foam pad. 5 reps x 10 cts; NE   Therapeutic Activity   Step up forward step onto and off of a 8 inch step with blueTB abduction resist (R) LE lead x 20 reps ea; NE    Straddle step onto 8 inch step with blueTB abduction resist (R) LE lead x 20 reps ea; NE (R) Lateral step down/hang 6 inch 2 sets x 10 reps x 5 eccen cts; NE    (R) Anterior step down/hang 4-6 inch 2 sets x 10 reps x 5 eccen cts; NE    Shuttle: (R) LE plyojump 2-3b straight and lateral x 20 reps ea; NE tired   Therapeutic Exercise Minutes 46 48 25 15   Neuro Re-Educ Minutes   10 10   Therapeutic Activity Minutes   15 25   Total Time Of Timed Procedures 46 48 50 50   Total  Time Of Service-Based Procedures 0 0 0 0   Total Treatment Time 47 49 50 50   HEP (R) Ankle DF with IV mobs with towel roll/pad x 10-20 mobs x 1-2x/day  Seated: (R) Ankle DF eccentric load with blackTB 2 x 20 reps x 10 eccen ct each x 1-2x/day (R) Ankle DF with IV mobs using a wedge x 10 reps x 2-3x/day  (R>L) Calf raises holding on to counter or table x 10 reps x 10 eccen ct ea x 2-3x/day (R) Ankle DF with IV mobs using a wedge x 10 reps x 2-3x/day   (R>L) Calf raises holding on to counter or table x 10 reps x 10 eccen ct ea x 2-3x/day    (R) Ankle DF with IV mobs using a wedge x 10 reps x 2-3x/day   (R>L) Calf raises holding on to counter or table x 10 reps x 10 eccen ct ea x 2-3x/day           HEP  (R) Ankle DF with IV mobs using a wedge x 10 reps x 2-3x/day   (R>L) Calf raises holding on to counter or table x 10 reps x 10 eccen ct ea x 2-3x/day       Charges     Ther Ex x 1, Neuro x 1, Ther Act x 2    On this date patient was seen by Jose Castro PTA under the supervision of Koko Medley PT. Cert MDT.

## 2025-04-14 ENCOUNTER — OFFICE VISIT (OUTPATIENT)
Dept: PHYSICAL THERAPY | Age: 50
End: 2025-04-14
Attending: ORTHOPAEDIC SURGERY
Payer: COMMERCIAL

## 2025-04-14 PROCEDURE — 97530 THERAPEUTIC ACTIVITIES: CPT

## 2025-04-14 PROCEDURE — 97112 NEUROMUSCULAR REEDUCATION: CPT

## 2025-04-14 PROCEDURE — 97110 THERAPEUTIC EXERCISES: CPT

## 2025-04-14 NOTE — PROGRESS NOTES
Patient: Sonny Baker (49 year old, male) Referring Provider:  Insurance:   Diagnosis: Achilles tendinosis of right ankle (M67.873) Karis GIANG   Date of Surgery: 1/2/2025 Next MD visit:  N/A   Precautions:  Other (use comment) ((R) LE WBAT (using walking boot)) 3/19/2025 Referral Information:    Date of Evaluation: Req: 0, Auth: 0, Exp:     02/12/25 POC Auth Visits:  20       Today's Date   4/14/2025    Subjective  Vinay continues to report being symptom free in the (R) ankle/foot on this date and since his previous session.  He cited being able to perform all home/work activities without any onset of symptoms.  He hasn't tried running but has initiated stair negotiation to which the pt reported having no difficulty with.  He reported doing his HEP \"a few times a day.\"       Pain: 0/10     Objective  (R) Ankle Test Motion: Gait: NE // 8 inch step: Up/Down/Back up: NE         Assessment  Vinay maintains WNL of (R) ankle AROM without symptom production in all planes of motion.  He continues to demonstrate improvements in his condition as he is able to handle the progression of his (R) ankle strength/stability exercises without any adverse effects.  He is responding well to his DP exercise for his (R) ankle into Ankle DF with Inv. He continues to fatigue easily during (R) calf strengthening but is able to recover with a standing rest break before initiating another set of the same exercise.  He was told to continue with his current HEP and verbalized understanding/agreement.  All questions were answered at this time.    Goals (to be met in 20 visits)   On track  1. Patient to consistently perform their HEP and it's progression to maintain their improved condition.  2. Patient to have reduced and abolished symptoms to to be able to place full weight, walk, climb up/down stairs, run, and drive without producing symptoms in the (R) heel/foot.   3. Patient to have WNL of (R) Ankle/Foot AROM in all directions  with 4-5/5 MMT in all motions to promote all home, work, recreational, and functional activities without discomfort or deviation.            Plan  Next session continue with SL balance/proprioception exercises to improve ankle stability.  Plan to re-assess next session and continue with directional preference exercise, stretching, strengthening/stability exercises, mobilization, balance/proprioceptive exercises, gait training, patient education, and HEP progression.    Treatment Last 4 Visits  Treatment Day: 17       3/28/2025 4/7/2025 4/11/2025 4/14/2025   LE Treatment   Therapeutic Exercise TM: Retro walk @ 5% grade x 1.2 mph x 7 mins; NE     - forward walk @ 0% grade x 1.8-2.0 mph x 10 mins; P, NW (R) heel (better after (R) ankle mobs toward DF with IV x 20 mobs)    Step standing leaning on wall (R) foot on 4 inch step x 10 + 7 reps x 10 eccen ct ea; NE tired    Unable to do (R) SLStance with medial rotate    Hydrant: (R/L) LE 2 kg ball 5 reps x 10 cts ea; NE     Monster walk fwd/bkwd with green-blueTB 5 laps x 30 feet; NE tired    Lateral shuffle with greenTB 3 laps x 15 feet; NE tired    Shuttle: (R) Calf raises 4b x 20+17 reps; NE tired    Shuttle: (R) LE plyojump 2b straight and lateral x 20 reps ea; NE tired    (B) Calf raises holding on to rail 3-5 reps x 10 eccen ct ea; NE (home exercise)    (R) Ankle DF with IV mobs using a wedge x 10 reps; NE TM: Retro walk @ 8% grade x 1.2 mph x 10 mins; NE     (B) Calf stretch (gastroc/soleus) on inclined step L4. 3 reps x 30 secs ea; NE     (R) Ankle DF with IV mobs using a wedge x 10 bounces; NE (cued for proper technique)    Step standing (R) LE on 4 inch step leaning on wall 2 sets x 10 reps x 10 eccen cts; NE     Shuttle: (R) leg press 8b with toe up. 20 reps x 5 eccen cts; NE    Shuttle: (R) LE plyojump straight and lateral 2b 2 sets x 10 reps ea; NE     (R) Ankle DF with IV mobs with wedge x 20 mobs; NE    TM: Fwd Walking 1.2 - 2.5 mph x 10 min; NE    Ankle DF  w/Inv mobs over wedges x 20 bounces; NE (stiff/tight)    (B) Heel Raises over 4 inch step 10 reps x 5 eccen cts; NE  + with lean to the right 10 reps x 5 eccen cts; NE tired    Ankle DF w/Inv mobs over wedges x 20 bounces; NE (stiff/tight) TM: Fwd Walking 1.2 - 2.5 mph x 10 min; NE     Ankle DF w/Inv mobs with towel underneath wedge x 20 bounces; NE (stiff/tight)     (B) Heel Raises over 4 inch step 10 reps x 5 eccen cts; NE   + with lean to the right 10 reps x 5 eccen cts; NE tired     Ankle DF w/Inv mobs with towel underneath wedge x 20 bounces; NE (stiff/tight)      Neuro Re-Education  (R) SL Wobble Board Fwd/Bkwd Taps x 20 reps; NE    (R) Step up onto blue foam and tap red bolster with (L) LE x 20 reps; NE tired    (R) SL Stance on Blue Foam. 3 sets x 30 cts; NE (ankle instability noted) Tandem walking b/w // bars 3 laps x 20 feet; NE    BAPS Board in // bars Lv 5. DF/PF-Inv/Ev-CW/CCW x 10 reps ea; NE    (L/R) Hydrants with red ball 1kg on blue foam pad. 5 reps x 10 cts; NE (R) SL Stance on Blue foam pad 3 sets x 15 cts; NE (minimal ankle instability)    (L/R) Hydrants with yellow ball 2kg on blue foam pad. 5 reps x 10 cts; NE     (R) Step ups onto Blue Foam pad with red bolster taps using (L) LE x 20 reps; NE   Therapeutic Activity  Step up forward step onto and off of a 8 inch step with blueTB abduction resist (R) LE lead x 20 reps ea; NE    Straddle step onto 8 inch step with blueTB abduction resist (R) LE lead x 20 reps ea; NE (R) Lateral step down/hang 6 inch 2 sets x 10 reps x 5 eccen cts; NE    (R) Anterior step down/hang 4-6 inch 2 sets x 10 reps x 5 eccen cts; NE    Shuttle: (R) LE plyojump 2-3b straight and lateral x 20 reps ea; NE tired (R) Lateral step down/hang 8 inch x 10 reps x 10 eccen cts; NE     (R) Anterior step down/hang 6 inch x 10 reps x 10 eccen cts; NE     Shuttle: (R) LE plyojump 3b straight and lateral x 20 reps ea; NE tired     (B) [place] Walk on toes w/out lb. 2 laps x 30 cts; NE  tired   Therapeutic Exercise Minutes 48 25 15 10   Neuro Re-Educ Minutes  10 10 15   Therapeutic Activity Minutes  15 25 25   Total Time Of Timed Procedures 48 50 50 50   Total Time Of Service-Based Procedures 0 0 0 0   Total Treatment Time 49 50 50 50   HEP (R) Ankle DF with IV mobs using a wedge x 10 reps x 2-3x/day  (R>L) Calf raises holding on to counter or table x 10 reps x 10 eccen ct ea x 2-3x/day (R) Ankle DF with IV mobs using a wedge x 10 reps x 2-3x/day   (R>L) Calf raises holding on to counter or table x 10 reps x 10 eccen ct ea x 2-3x/day    (R) Ankle DF with IV mobs using a wedge x 10 reps x 2-3x/day   (R>L) Calf raises holding on to counter or table x 10 reps x 10 eccen ct ea x 2-3x/day    (R) Ankle DF with IV mobs using a wedge x 10 reps x 2-3x/day   (R>L) Calf raises holding on to counter or table x 10 reps x 10 eccen ct ea x 2-3x/day           HEP  (R) Ankle DF with IV mobs using a wedge x 10 reps x 2-3x/day   (R>L) Calf raises holding on to counter or table x 10 reps x 10 eccen ct ea x 2-3x/day       Charges     Therapy Ex x 1, Therapy Act x 2, Neuro x 1    On this date patient was seen by Jose Castro PTA under the supervision of Koko Medley PT. Cert MDT.

## 2025-04-18 ENCOUNTER — OFFICE VISIT (OUTPATIENT)
Dept: PHYSICAL THERAPY | Age: 50
End: 2025-04-18
Attending: ORTHOPAEDIC SURGERY
Payer: COMMERCIAL

## 2025-04-18 PROCEDURE — 97112 NEUROMUSCULAR REEDUCATION: CPT

## 2025-04-18 PROCEDURE — 97530 THERAPEUTIC ACTIVITIES: CPT

## 2025-04-18 PROCEDURE — 97110 THERAPEUTIC EXERCISES: CPT

## 2025-04-21 ENCOUNTER — OFFICE VISIT (OUTPATIENT)
Dept: PHYSICAL THERAPY | Age: 50
End: 2025-04-21
Attending: ORTHOPAEDIC SURGERY
Payer: COMMERCIAL

## 2025-04-21 PROCEDURE — 97112 NEUROMUSCULAR REEDUCATION: CPT

## 2025-04-21 PROCEDURE — 97110 THERAPEUTIC EXERCISES: CPT

## 2025-04-21 PROCEDURE — 97530 THERAPEUTIC ACTIVITIES: CPT

## 2025-04-21 NOTE — PROGRESS NOTES
Patient: Sonny Baker (49 year old, male) Referring Provider:  Insurance:   Diagnosis: Achilles tendinosis of right ankle (M67.873) Karis GIANG   Date of Surgery: 1/2/2025 Next MD visit:  N/A   Precautions:  Other (use comment) ((R) LE WBAT (using walking boot)) 3/19/2025 Referral Information:    Date of Evaluation: Req: 0, Auth: 0, Exp:     02/12/25 POC Auth Visits:  20       Today's Date   4/18/2025    Subjective  Vinay continues to report being symptom free in the (R) ankle/foot on this date and since his previous session.  He cited trying to run and play soccer with his son the other day, to which he reported not being able to perform the activity due to an ache felt in the (R) ankleHe reported doing his HEP \"a few times a day.\"       Pain: 0/10     Objective  (R) Ankle Test Motion: Gait: NE // 8 inch step: Up/Down/Back up: NE              Assessment  Vinay maintains WNL of (R) ankle AROM without symptom production in all planes of motion.  He continues to demonstrate improvements in his condition as he is able to handle the progression of his (R) ankle strength/stability exercises without any adverse effects.  He is responding well to his DP exercise for his (R) ankle into Ankle DF with Inv. He continues to fatigue easily during (R) calf strengthening but is able to recover with a standing rest break before initiating another set of the same exercise.  He was told to continue with his current HEP and verbalized understanding/agreement.  All questions were answered at this time.    Goals (to be met in 20 visits)   On track  1. Patient to consistently perform their HEP and it's progression to maintain their improved condition.  2. Patient to have reduced and abolished symptoms to to be able to place full weight, walk, climb up/down stairs, run, and drive without producing symptoms in the (R) heel/foot.   3. Patient to have WNL of (R) Ankle/Foot AROM in all directions with 4-5/5 MMT in all motions to  promote all home, work, recreational, and functional activities without discomfort or deviation.                Plan  Next session continue with SL balance/proprioception exercises to improve ankle stability.  Plan to re-assess next session and continue with directional preference exercise, stretching, strengthening/stability exercises, mobilization, balance/proprioceptive exercises, gait training, patient education, and HEP progression.    Treatment Last 4 Visits  Treatment Day: 18 4/7/2025 4/11/2025 4/14/2025 4/18/2025   LE Treatment   Therapeutic Exercise TM: Retro walk @ 8% grade x 1.2 mph x 10 mins; NE     (B) Calf stretch (gastroc/soleus) on inclined step L4. 3 reps x 30 secs ea; NE     (R) Ankle DF with IV mobs using a wedge x 10 bounces; NE (cued for proper technique)    Step standing (R) LE on 4 inch step leaning on wall 2 sets x 10 reps x 10 eccen cts; NE     Shuttle: (R) leg press 8b with toe up. 20 reps x 5 eccen cts; NE    Shuttle: (R) LE plyojump straight and lateral 2b 2 sets x 10 reps ea; NE     (R) Ankle DF with IV mobs with wedge x 20 mobs; NE    TM: Fwd Walking 1.2 - 2.5 mph x 10 min; NE    Ankle DF w/Inv mobs over wedges x 20 bounces; NE (stiff/tight)    (B) Heel Raises over 4 inch step 10 reps x 5 eccen cts; NE  + with lean to the right 10 reps x 5 eccen cts; NE tired    Ankle DF w/Inv mobs over wedges x 20 bounces; NE (stiff/tight) TM: Fwd Walking 1.2 - 2.5 mph x 10 min; NE     Ankle DF w/Inv mobs with towel underneath wedge x 20 bounces; NE (stiff/tight)     (B) Heel Raises over 4 inch step 10 reps x 5 eccen cts; NE   + with lean to the right 10 reps x 5 eccen cts; NE tired     Ankle DF w/Inv mobs with towel underneath wedge x 20 bounces; NE (stiff/tight)    TM: Fwd Walking 1.2 - 2.5 mph x 10 min; NE     Ankle DF w/Inv mobs with towel underneath wedge x 20 bounces; NE (stiff/tight)     (B) Heel Raises over 4 inch step 10 reps x 5 eccen cts; NE   + with lean to the right 10 reps x 5 eccen  cts; NE tired     Ankle DF w/Inv mobs with towel underneath wedge x 20 bounces; NE (stiff/tight)      Neuro Re-Education (R) SL Wobble Board Fwd/Bkwd Taps x 20 reps; NE    (R) Step up onto blue foam and tap red bolster with (L) LE x 20 reps; NE tired    (R) SL Stance on Blue Foam. 3 sets x 30 cts; NE (ankle instability noted) Tandem walking b/w // bars 3 laps x 20 feet; NE    BAPS Board in // bars Lv 5. DF/PF-Inv/Ev-CW/CCW x 10 reps ea; NE    (L/R) Hydrants with red ball 1kg on blue foam pad. 5 reps x 10 cts; NE (R) SL Stance on Blue foam pad 3 sets x 15 cts; NE (minimal ankle instability)    (L/R) Hydrants with yellow ball 2kg on blue foam pad. 5 reps x 10 cts; NE     (R) Step ups onto Blue Foam pad with red bolster taps using (L) LE x 20 reps; NE (R) SL Stance on Blue foam pad 3 sets x 15 cts; NE (minimal ankle instability)     (L/R) Hydrants with yellow ball 2kg on blue foam pad. 5 reps x 10 cts; NE     (R) Step ups onto Blue Foam pad with red bolster taps using (L) LE x 20 reps; NE      Therapeutic Activity Step up forward step onto and off of a 8 inch step with blueTB abduction resist (R) LE lead x 20 reps ea; NE    Straddle step onto 8 inch step with blueTB abduction resist (R) LE lead x 20 reps ea; NE (R) Lateral step down/hang 6 inch 2 sets x 10 reps x 5 eccen cts; NE    (R) Anterior step down/hang 4-6 inch 2 sets x 10 reps x 5 eccen cts; NE    Shuttle: (R) LE plyojump 2-3b straight and lateral x 20 reps ea; NE tired (R) Lateral step down/hang 8 inch x 10 reps x 10 eccen cts; NE     (R) Anterior step down/hang 6 inch x 10 reps x 10 eccen cts; NE     Shuttle: (R) LE plyojump 3b straight and lateral x 20 reps ea; NE tired     (B) [place] Walk on toes w/out lb. 2 laps x 30 cts; NE tired (R) Lateral step down/hang 8 inch x 10 reps x 10 eccen cts; NE     (R) Anterior step down/hang 6 inch x 10 reps x 10 eccen cts; NE     Shuttle: (R) LE plyojump 3b straight and lateral x 20 reps ea; NE tired     (B) [place] Walk  on toes w/out lb. 2 laps x 30 cts; NE tired      Therapeutic Exercise Minutes 25 15 10 15   Neuro Re-Educ Minutes 10 10 15 10   Therapeutic Activity Minutes 15 25 25 25   Total Time Of Timed Procedures 50 50 50 50   Total Time Of Service-Based Procedures 0 0 0 0   Total Treatment Time 50 50 50 50   HEP (R) Ankle DF with IV mobs using a wedge x 10 reps x 2-3x/day   (R>L) Calf raises holding on to counter or table x 10 reps x 10 eccen ct ea x 2-3x/day    (R) Ankle DF with IV mobs using a wedge x 10 reps x 2-3x/day   (R>L) Calf raises holding on to counter or table x 10 reps x 10 eccen ct ea x 2-3x/day    (R) Ankle DF with IV mobs using a wedge x 10 reps x 2-3x/day   (R>L) Calf raises holding on to counter or table x 10 reps x 10 eccen ct ea x 2-3x/day            HEP  (R) Ankle DF with IV mobs using a wedge x 10 reps x 2-3x/day   (R>L) Calf raises holding on to counter or table x 10 reps x 10 eccen ct ea x 2-3x/day       Charges     Therapy Ex x 1, Neuro x 1, Ther Act x 2    On this date patient was seen by Jose Castro PTA under the supervision of Koko Medley PT. Cert MDT.

## 2025-04-22 NOTE — PROGRESS NOTES
Patient: Sonny Baker (49 year old, male) Referring Provider:  Insurance:   Diagnosis: Achilles tendinosis of right ankle (M67.873) Karis GIANG   Date of Surgery: 1/2/2025 Next MD visit:  N/A   Precautions:  Other (use comment) ((R) LE WBAT (using walking boot)) 3/19/2025 Referral Information:    Date of Evaluation: Req: 0, Auth: 0, Exp:     02/12/25 POC Auth Visits:  20       Today's Date   4/21/2025    Subjective  Vinay continues to report being symptom free in the (R) ankle/foot on this date and since his previous session.  He reported doing his HEP \"a few times a day.\"       Pain: 0/10     Objective  (R) Ankle Test Motion: Gait: NE // 8 inch step: Up/Down/Back up: NE /// Ambulation: NE         Assessment  Vinay continues to be responding to his (R) ankle DP exercise towards DF with Inv and he maintains WNL of (R) ankle AROM without symptom production in all planes of motion.  He continues to demonstrate improvements in his condition as he is able to handle the progression of his (R) ankle strength/stability exercises without any adverse effects. He continues to fatigue easily during isolated (R) calf strengthening, and in today's session he was able to maintain elevated heels while performing a farmer's carry on his toes therefore indicating improvements in strength of the (R) extrinsic and intrinsic calf muscles.  He was told to continue with his current HEP and verbalized understanding/agreement.  All questions were answered at this time.    Goals (to be met in 20 visits)   On track  1. Patient to consistently perform their HEP and it's progression to maintain their improved condition.  2. Patient to have reduced and abolished symptoms to to be able to place full weight, walk, climb up/down stairs, run, and drive without producing symptoms in the (R) heel/foot.   3. Patient to have WNL of (R) Ankle/Foot AROM in all directions with 4-5/5 MMT in all motions to promote all home, work, recreational,  and functional activities without discomfort or deviation.                    Plan  Next session continue with SL balance/proprioception exercises to improve ankle stability.  Plan to re-assess next session and continue with directional preference exercise, stretching, strengthening/stability exercises, mobilization, balance/proprioceptive exercises, gait training, patient education, and HEP progression.    Treatment Last 4 Visits  Treatment Day: 19 4/11/2025 4/14/2025 4/18/2025 4/21/2025   LE Treatment   Therapeutic Exercise TM: Fwd Walking 1.2 - 2.5 mph x 10 min; NE    Ankle DF w/Inv mobs over wedges x 20 bounces; NE (stiff/tight)    (B) Heel Raises over 4 inch step 10 reps x 5 eccen cts; NE  + with lean to the right 10 reps x 5 eccen cts; NE tired    Ankle DF w/Inv mobs over wedges x 20 bounces; NE (stiff/tight) TM: Fwd Walking 1.2 - 2.5 mph x 10 min; NE     Ankle DF w/Inv mobs with towel underneath wedge x 20 bounces; NE (stiff/tight)     (B) Heel Raises over 4 inch step 10 reps x 5 eccen cts; NE   + with lean to the right 10 reps x 5 eccen cts; NE tired     Ankle DF w/Inv mobs with towel underneath wedge x 20 bounces; NE (stiff/tight)    TM: Fwd Walking 1.2 - 2.5 mph x 10 min; NE     Ankle DF w/Inv mobs with towel underneath wedge x 20 bounces; NE (stiff/tight)     (B) Heel Raises over 4 inch step 10 reps x 5 eccen cts; NE   + with lean to the right 10 reps x 5 eccen cts; NE tired     Ankle DF w/Inv mobs with towel underneath wedge x 20 bounces; NE (stiff/tight)    TM: Fwd Walking 1.2 - 2.5 mph x 10 min; NE     Ankle DF w/Inv mobs with wedge x 20 bounces; NE (stiff/tight)     (R) SL Heel Raises over 4 inch step 10 reps x 5 eccen cts; NE tired    Ankle DF w/Inv mobs with wedge x 20 bounces; NE (stiff/tight)      Neuro Re-Education Tandem walking b/w // bars 3 laps x 20 feet; NE    BAPS Board in // bars Lv 5. DF/PF-Inv/Ev-CW/CCW x 10 reps ea; NE    (L/R) Hydrants with red ball 1kg on blue foam pad. 5 reps x  10 cts; NE (R) SL Stance on Blue foam pad 3 sets x 15 cts; NE (minimal ankle instability)    (L/R) Hydrants with yellow ball 2kg on blue foam pad. 5 reps x 10 cts; NE     (R) Step ups onto Blue Foam pad with red bolster taps using (L) LE x 20 reps; NE (R) SL Stance on Blue foam pad 3 sets x 15 cts; NE (minimal ankle instability)     (L/R) Hydrants with yellow ball 2kg on blue foam pad. 5 reps x 10 cts; NE     (R) Step ups onto Blue Foam pad with red bolster taps using (L) LE x 20 reps; NE    (R) SL Stance on Blue foam pad 3 sets x 15 cts; NE (minimal ankle instability)     (R) Step ups onto Blue Foam pad with red bolster taps using (L) LE x 20 reps; NE      Therapeutic Activity (R) Lateral step down/hang 6 inch 2 sets x 10 reps x 5 eccen cts; NE    (R) Anterior step down/hang 4-6 inch 2 sets x 10 reps x 5 eccen cts; NE    Shuttle: (R) LE plyojump 2-3b straight and lateral x 20 reps ea; NE tired (R) Lateral step down/hang 8 inch x 10 reps x 10 eccen cts; NE     (R) Anterior step down/hang 6 inch x 10 reps x 10 eccen cts; NE     Shuttle: (R) LE plyojump 3b straight and lateral x 20 reps ea; NE tired     (B) [place] Walk on toes w/out lb. 2 laps x 30 cts; NE tired (R) Lateral step down/hang 8 inch x 10 reps x 10 eccen cts; NE     (R) Anterior step down/hang 6 inch x 10 reps x 10 eccen cts; NE     Shuttle: (R) LE plyojump 3b straight and lateral x 20 reps ea; NE tired     (B) [place] Walk on toes w/out lb. 2 laps x 30 cts; NE tired    (R) Lateral step down/hang 8 inch x 10 reps x 10 eccen cts; NE     (R) Anterior step down/hang 6 inch x 10 reps x 10 eccen cts; NE     Shuttle: (R) LE plyojump 3-4b straight and lateral x 20 reps ea; NE tired     (B) [place] Walk on toes w/ 10 lb in (R) Hand. 2 laps x 30 cts; NE tired      Therapeutic Exercise Minutes 15 10 15 15   Neuro Re-Educ Minutes 10 15 10 10   Therapeutic Activity Minutes 25 25 25 25   Total Time Of Timed Procedures 50 50 50 50   Total Time Of Service-Based  Procedures 0 0 0 0   Total Treatment Time 50 50 50 53   HEP (R) Ankle DF with IV mobs using a wedge x 10 reps x 2-3x/day   (R>L) Calf raises holding on to counter or table x 10 reps x 10 eccen ct ea x 2-3x/day    (R) Ankle DF with IV mobs using a wedge x 10 reps x 2-3x/day   (R>L) Calf raises holding on to counter or table x 10 reps x 10 eccen ct ea x 2-3x/day     (R) Ankle DF with IV mobs using a wedge x 10 reps x 2-3x/day   (R) SL Calf raises holding on to counter or table x 10 reps x 10 eccen ct ea x 2-3x/day           HEP  (R) Ankle DF with IV mobs using a wedge x 10 reps x 2-3x/day   (R) SL Calf raises holding on to counter or table x 10 reps x 10 eccen ct ea x 2-3x/day       Charges     Therapy Ex x 1, Neuro x 1, Therapy Act x 2      On this date patient was seen by Jose Castro PTA under the supervision of Koko Medley PT. Cert MDT.

## 2025-04-25 ENCOUNTER — APPOINTMENT (OUTPATIENT)
Dept: PHYSICAL THERAPY | Age: 50
End: 2025-04-25
Attending: ORTHOPAEDIC SURGERY
Payer: COMMERCIAL

## 2025-05-02 ENCOUNTER — OFFICE VISIT (OUTPATIENT)
Dept: PHYSICAL THERAPY | Age: 50
End: 2025-05-02
Attending: ORTHOPAEDIC SURGERY
Payer: COMMERCIAL

## 2025-05-02 DIAGNOSIS — S86.011D ACHILLES TENDON TEAR, RIGHT, SUBSEQUENT ENCOUNTER: ICD-10-CM

## 2025-05-02 PROCEDURE — 97110 THERAPEUTIC EXERCISES: CPT

## 2025-05-02 PROCEDURE — 97112 NEUROMUSCULAR REEDUCATION: CPT

## 2025-05-02 PROCEDURE — 97530 THERAPEUTIC ACTIVITIES: CPT

## 2025-05-04 NOTE — PROGRESS NOTES
Patient: Sonny Baker (49 year old, male) Referring Provider:  Insurance:   Diagnosis: Achilles tendinosis of right ankle (M67.873) Karis Gale NATHANMAZIN   Date of Surgery: 1/2/2025 Next MD visit:  N/A   Precautions:  None None Referral Information:    Date of Evaluation: Req: 0, Auth: 0, Exp:     02/12/25 POC Auth Visits:  20       Today's Date   5/2/2025    Subjective  Vinay continues to report feeling symptom free on this date, he notes being able to stand/walk prolonged periods of time, perform transfers in/out of his car, and even negotiate steps in a step through fashion without any onset of symptoms.  He does continue to experience an ache in the achillies tendon while running but also notes a decrease in those symptoms as he increases his toleration to those activities.  He said that he does his HEP everyday and plans to continue performing his exercises after d/c on this date.       Pain: 0/10     Objective/Assessment/HEP:  Sonny Baker is a 50 y/o male who has been coming to PT s/p (R) achillies tendon repair.  He has attended 20 sessions from 2/12/2025 to 5/2/2025.  Vinay has demonstrated significant improvements in both AROM and Strength of the (R) ankle.  He currently demonstrates WNL of (R) ankle AROM with a 4+ to 5 MMT in all planes of motion.  Vinay is now able to place full WB, walk, climb up/down stairs, run, and drive without producing symptoms in the (R) heel/foot.  He has remained compliant to his HEP and its progression, which he will continue to implement on a weekly basis in order to maintain his improved condition.  He verbalized understanding to updated HEP instruction and agreement to d/c on this date.  All questions were answered by the end of today's session.    LEFS Score:  Initial: (Patient-Rptd) 36.25 % (2/12/2025  2:57 PM)  Post: (Patient-Rptd) 92.5 % (5/2/2025  10:45 PM)    Goals (to be met in 20 visits)  MET  1. Patient to consistently perform their HEP and it's progression  to maintain their improved condition.  2. Patient to have reduced and abolished symptoms to to be able to place full weight, walk, climb up/down stairs, run, and drive without producing symptoms in the (R) heel/foot.   3. Patient to have WNL of (R) Ankle/Foot AROM in all directions with 4-5/5 MMT in all motions to promote all home, work, recreational, and functional activities without discomfort or deviation.     Plan  Sonny Baker was d/c on this date after reaching his estabilish PT goals, while returning to a PLOF.  He was advised to see his MD if symptoms were to return without alleviation after implementing his updated HEP.  He verbalized understanding to instruction.      On this date patient was seen by Jose Castro PTA under the supervision of Koko Medley PT. Cert MDT.

## 2025-05-04 NOTE — PROGRESS NOTES
Patient: Sonny Baker (49 year old, male) Referring Provider:  Insurance:   Diagnosis: Achilles tendinosis of right ankle (M67.873) Karis Beasley  JACMAZIN   Date of Surgery: 1/2/2025 Next MD visit:  N/A   Precautions:  Other (use comment) ((R) LE WBAT (using walking boot)) 3/19/2025 Referral Information:    Date of Evaluation: Req: 0, Auth: 0, Exp:     02/12/25 POC Auth Visits:  20       Today's Date   5/2/2025    Subjective  Vinay continues to report feeling symptom free on this date, he notes being able to stand/walk prolonged periods of time, perform transfers in/out of his car, and even negotiate steps in a step through fashion without any onset of symptoms.  He does continue to experience an ache in the achillies tendon while running but also notes a decrease in those symptoms as he increases his toleration to those activities.  He said that he does his HEP everyday and plans to continue performing his exercises after d/c on this date.       Pain: 0/10     Objective  (R) Ankle Test Motion: 8 inch step: Up/Down/Back up: NE /// Ambulation: NE       (*) Stiffness/Tightness/ache  Ankle/Foot   ROM MMT (-/5)    R L R L     PF 55 degs 55 degs 5/5 5/5     DF (L4) 6 degs  8 degs 5/5  5/5     Inversion 47 degs 50 degs 4+/5 5/5     Eversion 18 degs  20 degs 4+/5  5/5     Grt Toe Ext (L5)     -- (Hallux Plantar flexion MMT: 5/5) -- (Hallux Plantar flexion MMT: 5/5)        Assessment  Sonny Baker is a 50 y/o male who has been coming to PT s/p (R) achillies tendon repair.  He has attended 20 sessions from 2/12/2025 to 5/2/2025.  Vinay has demonstrated significant improvements in both AROM and Strength of the (R) ankle.  He currently demonstrates WNL of (R) ankle AROM with a 4+ to 5 MMT in all planes of motion.  Vinay is now able to place full WB, walk, climb up/down stairs, run, and drive without producing symptoms in the (R) heel/foot.  He has remained compliant to his HEP and its progression, which he will continue  to implement on a weekly basis in order to maintain his improved condition.  He verbalized understanding to updated HEP instruction and agreement to d/c on this date.  All questions were answered by the end of today's session.    LEFS Score:  Initial: (Patient-Rptd) 36.25 % (2/12/2025  2:57 PM)  Post: (Patient-Rptd) 92.5 % (5/2/2025  10:45 PM)    Goals (to be met in 20 visits)  MET  On track  1. Patient to consistently perform their HEP and it's progression to maintain their improved condition.  2. Patient to have reduced and abolished symptoms to to be able to place full weight, walk, climb up/down stairs, run, and drive without producing symptoms in the (R) heel/foot.   3. Patient to have WNL of (R) Ankle/Foot AROM in all directions with 4-5/5 MMT in all motions to promote all home, work, recreational, and functional activities without discomfort or deviation.                        Anil Baker was d/c on this date after reaching his estabilish PT goals, while returning to a PLOF.  He was advised to see his MD if symptoms were to return without alleviation after implementing his updated HEP.  He verbalized understanding to instruction.    Treatment Last 4 Visits  Treatment Day: 20 4/14/2025 4/18/2025 4/21/2025 5/2/2025   LE Treatment   Therapeutic Exercise TM: Fwd Walking 1.2 - 2.5 mph x 10 min; NE     Ankle DF w/Inv mobs with towel underneath wedge x 20 bounces; NE (stiff/tight)     (B) Heel Raises over 4 inch step 10 reps x 5 eccen cts; NE   + with lean to the right 10 reps x 5 eccen cts; NE tired     Ankle DF w/Inv mobs with towel underneath wedge x 20 bounces; NE (stiff/tight)    TM: Fwd Walking 1.2 - 2.5 mph x 10 min; NE     Ankle DF w/Inv mobs with towel underneath wedge x 20 bounces; NE (stiff/tight)     (B) Heel Raises over 4 inch step 10 reps x 5 eccen cts; NE   + with lean to the right 10 reps x 5 eccen cts; NE tired     Ankle DF w/Inv mobs with towel underneath wedge x 20 bounces; NE  (stiff/tight)    TM: Fwd Walking 1.2 - 2.5 mph x 10 min; NE     Ankle DF w/Inv mobs with wedge x 20 bounces; NE (stiff/tight)     (R) SL Heel Raises over 4 inch step 10 reps x 5 eccen cts; NE tired    Ankle DF w/Inv mobs with wedge x 20 bounces; NE (stiff/tight)    TM: Fwd Walking 1.2 - 2.5 mph x 10 min; NE     Ankle DF mobs with wedge and rolled towel x 20 bounces; NE    (R) SL Heel Raises over 4 inch step 2 sets x 10 reps x 5 eccen cts; NE tired     (R) Ankle Inversion/Eversion Strengthening Blue/Black TB x 20 reps; NE    (R) SL Stance on Blue foam pad 3 sets x 15 cts; NE    (R) Step ups onto Blue Foam pad with red bolster taps using (L) LE x 20 reps; NE     (R) Lateral step down/hang 8 inch x 10 reps x 10 eccen cts; NE     (R) Anterior step down/hang 6 inch x 10 reps x 10 eccen cts; NE     (R/L) Straddle Step Fwd/Bkwd with resist hip Abd Blue TB x 20 reps ea; NE    Ankle DF mobs with wedge and rolled towel x 20 bounces; NE       Neuro Re-Education (R) SL Stance on Blue foam pad 3 sets x 15 cts; NE (minimal ankle instability)    (L/R) Hydrants with yellow ball 2kg on blue foam pad. 5 reps x 10 cts; NE     (R) Step ups onto Blue Foam pad with red bolster taps using (L) LE x 20 reps; NE (R) SL Stance on Blue foam pad 3 sets x 15 cts; NE (minimal ankle instability)     (L/R) Hydrants with yellow ball 2kg on blue foam pad. 5 reps x 10 cts; NE     (R) Step ups onto Blue Foam pad with red bolster taps using (L) LE x 20 reps; NE    (R) SL Stance on Blue foam pad 3 sets x 15 cts; NE (minimal ankle instability)     (R) Step ups onto Blue Foam pad with red bolster taps using (L) LE x 20 reps; NE       Therapeutic Activity (R) Lateral step down/hang 8 inch x 10 reps x 10 eccen cts; NE     (R) Anterior step down/hang 6 inch x 10 reps x 10 eccen cts; NE     Shuttle: (R) LE plyojump 3b straight and lateral x 20 reps ea; NE tired     (B) [place] Walk on toes w/out lb. 2 laps x 30 cts; NE tired (R) Lateral step down/hang 8 inch x  10 reps x 10 eccen cts; NE     (R) Anterior step down/hang 6 inch x 10 reps x 10 eccen cts; NE     Shuttle: (R) LE plyojump 3b straight and lateral x 20 reps ea; NE tired     (B) [place] Walk on toes w/out lb. 2 laps x 30 cts; NE tired    (R) Lateral step down/hang 8 inch x 10 reps x 10 eccen cts; NE     (R) Anterior step down/hang 6 inch x 10 reps x 10 eccen cts; NE     Shuttle: (R) LE plyojump 3-4b straight and lateral x 20 reps ea; NE tired     (B) [place] Walk on toes w/ 10 lb in (R) Hand. 2 laps x 30 cts; NE tired       Therapeutic Exercise Minutes 10 15 15 25   Neuro Re-Educ Minutes 15 10 10 10   Therapeutic Activity Minutes 25 25 25 10   Total Time Of Timed Procedures 50 50 50 45   Total Time Of Service-Based Procedures 0 0 0 0   Total Treatment Time 50 50 53 45   HEP (R) Ankle DF with IV mobs using a wedge x 10 reps x 2-3x/day   (R>L) Calf raises holding on to counter or table x 10 reps x 10 eccen ct ea x 2-3x/day     (R) Ankle DF with IV mobs using a wedge x 10 reps x 2-3x/day   (R) SL Calf raises holding on to counter or table x 10 reps x 10 eccen ct ea x 2-3x/day    1x/day:  (R) Ankle DF mobs using a wedge x 10 reps    (R) SL Eccentric Calf raises holding on to counter or table x 10 reps x 10 eccen ct ea    (R) Inversion and Eversion Ankle Strengthening with blue/black TB x 20 reps ea    (R) SL Balance at counter for safety 3 reps x 15-30 sec ea          HEP  1x/day:  (R) Ankle DF mobs using a wedge x 10 reps    (R) SL Eccentric Calf raises holding on to counter or table x 10 reps x 10 eccen ct ea    (R) Inversion and Eversion Ankle Strengthening with blue/black TB x 20 reps ea    (R) SL Balance at counter for safety 3 reps x 15-30 sec ea      Charges     Therapy Ex x 2, Activities x 1, Neuro x 1    On this date patient was seen by Jose Castro PTA under the supervision of Koko Medley PT. Cert MDT.

## 2025-05-06 RX ORDER — ASPIRIN 81 MG/1
TABLET, COATED ORAL
Qty: 60 TABLET | Refills: 0 | OUTPATIENT
Start: 2025-05-06

## 2025-05-28 ENCOUNTER — OFFICE VISIT (OUTPATIENT)
Dept: ORTHOPEDICS CLINIC | Facility: CLINIC | Age: 50
End: 2025-05-28
Payer: COMMERCIAL

## 2025-05-28 DIAGNOSIS — M76.61 ACHILLES TENDINITIS OF RIGHT LOWER EXTREMITY: ICD-10-CM

## 2025-05-28 DIAGNOSIS — S86.011D ACHILLES TENDON TEAR, RIGHT, SUBSEQUENT ENCOUNTER: ICD-10-CM

## 2025-05-28 DIAGNOSIS — M67.873 ACHILLES TENDINOSIS OF RIGHT ANKLE: Primary | ICD-10-CM

## 2025-05-28 PROCEDURE — 99213 OFFICE O/P EST LOW 20 MIN: CPT | Performed by: ORTHOPAEDIC SURGERY

## 2025-05-28 NOTE — PROGRESS NOTES
Chief Complaint   Patient presents with    Follow - Up     Right ankle sx done on 1/2. Patient denies any pain at this time.      HPI  Patient is a 50 yo male presenting for follow-up evaluation after right Achilles tendon surgery performed approximately five months ago. He reports significant improvement in pain and function. He was able to walk at a water park with his son, which involved walking long distances, and was able to manage with occasional breaks. He experienced some pain and swelling during the activity, but it was less severe than prior to surgery. He notes he would not have been able to do this prior to surgery. He has graduated from physical therapy and continues to perform home exercises for stretching and strengthening. He is able to walk longer distances at home without significant issues. He is not requiring anti-inflammatory medications frequently and has some medications remaining from the surgery if needed. He denies any new symptoms.    Physical Exam  Gen: NAD, alert, answering questions appropriately  HEENT: NCAT, EOMI  Lungs: NL breathing, symmetrical lung expansion  Abd: Soft, ND  Extremities:   Right Ankle with neutral alignment, DF/PF/EHL intact, SILT, cap refill brisk. Minimal achilles swelling. Achilles nontender. Active ankle plantarflexion. Unable to perform single limb heel rise.    Right ankle:  Inspection: Incision healed well, minimal swelling.  Palpation: Mild tenderness over Achilles tendon with pressure.  Range of Motion:  Right ankle dorsiflexion to neutral.  Skin:  Incision healed well; skin intact without lesions.  Neurovascular:  Sensation intact; dorsalis pedis and posterior tibial pulses palpable; capillary refill less than 2 seconds.  Coordination or Gait:  Able to ambulate with improved endurance; continues to work on strengthening exercises.      Assessment/Plan: 49 year old year old male presenting status post insertional Achilles tendon repair on 1/2/2025.   1.  Achilles tendinosis of right ankle  Continue home exercises, orthotics and shoewear modifications as needed.    2. Achilles tendon tear, right, subsequent encounter  Continue PT exercises    3. Achilles tendinitis of right lower extremity  Home exercises.     Prescription management:    May take ibuprofen 600 mg q 6 hours as needed for pain.    - Education:    - Advised patient to continue stretching exercises for calf and hamstring muscles to improve flexibility and reduce Achilles tendon tension.    - Discussed expectations for recovery timeline (up to one year).  - Medications:    - Continue ibuprofen 600?mg as needed for pain.  - Home Exercise Program:    - Continue home exercises focusing on strengthening and stretching per physical therapy recommendations.  - Imaging Orders:    - Plan to obtain right ankle X-ray at next visit to assess healing   - Activity / Weight-bearing:    - May continue activities as tolerated; advised to allow adequate rest between activities.  - Follow-up:    - Return to clinic in 3 months for reevaluation.      Return in about 3 months (around 8/28/2025).      Karis Beasley DO  05/28/25

## 2025-07-02 ENCOUNTER — PATIENT MESSAGE (OUTPATIENT)
Dept: ORTHOPEDICS CLINIC | Facility: CLINIC | Age: 50
End: 2025-07-02

## 2025-07-02 DIAGNOSIS — S86.011D ACHILLES TENDON TEAR, RIGHT, SUBSEQUENT ENCOUNTER: ICD-10-CM

## 2025-07-02 DIAGNOSIS — M67.873 ACHILLES TENDINOSIS OF RIGHT ANKLE: Primary | ICD-10-CM

## 2025-07-02 DIAGNOSIS — M76.61 ACHILLES TENDINITIS OF RIGHT LOWER EXTREMITY: ICD-10-CM

## 2025-07-09 RX ORDER — IBUPROFEN 600 MG/1
600 TABLET, FILM COATED ORAL EVERY 6 HOURS PRN
Qty: 60 TABLET | Refills: 0 | Status: SHIPPED | OUTPATIENT
Start: 2025-07-09

## 2025-08-05 RX ORDER — ATORVASTATIN CALCIUM 10 MG/1
10 TABLET, FILM COATED ORAL NIGHTLY
Qty: 90 TABLET | Refills: 3 | Status: SHIPPED | OUTPATIENT
Start: 2025-08-05

## 2025-08-05 RX ORDER — LISINOPRIL AND HYDROCHLOROTHIAZIDE 10; 12.5 MG/1; MG/1
1 TABLET ORAL DAILY
Qty: 90 TABLET | Refills: 3 | Status: SHIPPED | OUTPATIENT
Start: 2025-08-05

## 2025-08-27 ENCOUNTER — HOSPITAL ENCOUNTER (OUTPATIENT)
Dept: GENERAL RADIOLOGY | Age: 50
Discharge: HOME | End: 2025-08-27
Attending: ORTHOPAEDIC SURGERY

## 2025-08-27 DIAGNOSIS — M76.61 ACHILLES TENDINITIS OF RIGHT LOWER EXTREMITY: ICD-10-CM

## 2025-08-27 PROCEDURE — 73610 X-RAY EXAM OF ANKLE: CPT | Performed by: ORTHOPAEDIC SURGERY

## (undated) DEVICE — BATTERY

## (undated) DEVICE — THREADED CLEAR CANNULA WITH OBTURATOR 5.5MM X 75MM

## (undated) DEVICE — DRAPE SRG 70X60IN SPLT U IMPRV

## (undated) DEVICE — KIT ENDO ORCAPOD 160/180/190

## (undated) DEVICE — 60 ML SYRINGE LUER-LOCK TIP: Brand: MONOJECT

## (undated) DEVICE — SUTURE NABSB OTHCRD 36IN 2

## (undated) DEVICE — SHOULDER ARTHROSCOPY: Brand: MEDLINE INDUSTRIES, INC.

## (undated) DEVICE — COTTON ROLL: Brand: DEROYAL

## (undated) DEVICE — WEBRIL COTTON UNDERCAST PADDING: Brand: WEBRIL

## (undated) DEVICE — SUTURE NABSB OTHCRD 2 MO-7

## (undated) DEVICE — KIT SRG NYL ROPE S HK TRC

## (undated) DEVICE — TETRA-FLEX CF WOVEN LATEX FREE ELASTIC BANDAGE 4" X 5.5 YD: Brand: TETRA-FLEX™CF

## (undated) DEVICE — SUT MCRYL 3-0 27IN ABSRB UD L24MM PS-1

## (undated) DEVICE — CHLORAPREP 26ML APPLICATOR

## (undated) DEVICE — ENCORE® LATEX ACCLAIM SIZE 8, STERILE LATEX POWDER-FREE SURGICAL GLOVE: Brand: ENCORE

## (undated) DEVICE — BLADE SHVR COOLCUT 13CM 4MM

## (undated) DEVICE — WOUND CONTACT DRESSING 4IN X 5IN: Brand: SILVERLON ANTIMICROBIAL WOUND DRESSING

## (undated) DEVICE — SOL  .9 3000ML

## (undated) DEVICE — PAD THRP WRPON FM BCK XL UNV

## (undated) DEVICE — NEEDLE HPO 18GA 1.5IN ECLPS

## (undated) DEVICE — PAD,ABDOMINAL,8"X7.5",STERILE,LF,1/PK: Brand: MEDLINE

## (undated) DEVICE — SHOULDER P.A.D II: Brand: DEROYAL

## (undated) DEVICE — SOLUTION IRRIG 1000ML 0.9% NACL USP BTL

## (undated) DEVICE — TOWEL,OR,DSP,ST,BLUE,DLX,2/PK,40PK/CS: Brand: MEDLINE

## (undated) DEVICE — EXPRESSEW III SUTURE NEEDLE FOR USE WITH EXPRESSEW II OR III SUTURE PASSER: Brand: EXPRESSEW

## (undated) DEVICE — TUBING IRR 16FT CNT WV 3 ASCP

## (undated) DEVICE — APPLICATOR PREP 26ML CHG 2% ISO ALC 70%

## (undated) DEVICE — SNARE OPTMZ PLPCTM TRP

## (undated) DEVICE — BURR SHVR COOLCUT 13CM 4MM 8

## (undated) DEVICE — SUTURE ETHILON 3-0 669H

## (undated) DEVICE — 3M™ MEDITPORE™ SOFT CLOTH TAPE 6 IN X 10 YD 12 ROLLS/CASE 2966: Brand: 3M™ MEDIPORE™

## (undated) DEVICE — KIT CLEAN ENDOKIT 1.1OZ GOWNX2

## (undated) DEVICE — SUT COAT VCRL + 2-0 27IN ABSRB UD ANTIBACT CT-1

## (undated) DEVICE — GAMMEX® PI HYBRID SIZE 6.5, STERILE POWDER-FREE SURGICAL GLOVE, POLYISOPRENE AND NEOPRENE BLEND: Brand: GAMMEX

## (undated) DEVICE — POLAR CARE CUBE COOLING UNIT

## (undated) DEVICE — 3 ML SYRINGE LUER-LOCK TIP: Brand: MONOJECT

## (undated) DEVICE — GAMMEX® NON-LATEX PI ORTHO SIZE 6.5, STERILE POLYISOPRENE POWDER-FREE SURGICAL GLOVE: Brand: GAMMEX

## (undated) DEVICE — LINE MNTR ADLT SET O2 INTMD

## (undated) DEVICE — SNARE ENDOSCOPIC 10MM ROUND

## (undated) DEVICE — COTTON UNDERCAST PADDING,REGULAR FINISH: Brand: WEBRIL

## (undated) DEVICE — SPLINT ONESTEP 4X30IN FBRGLS MOLD

## (undated) DEVICE — SUTURE FIBERWIRE S AR-7200

## (undated) DEVICE — AMBIENT SUPER TURBOVAC 90 IFS: Brand: COBLATION

## (undated) DEVICE — SPONGE 4X4 10PK

## (undated) DEVICE — PACK CDS LOWER EXTREMITY

## (undated) DEVICE — SUTURE PDS II 0 CT-1

## (undated) DEVICE — 1010 S-DRAPE TOWEL DRAPE 10/BX: Brand: STERI-DRAPE™

## (undated) DEVICE — ABDOMINAL PAD: Brand: CURITY

## (undated) DEVICE — TETRA-FLEX CF WOVEN LATEX FREE ELASTIC BANDAGE 6" X 5.5 YD: Brand: TETRA-FLEX™CF

## (undated) DEVICE — CANNULA 8.5MM TWIST AR-6530

## (undated) DEVICE — THREADED CLEAR CANNULA WITH OBTURATOR 7MM X 75MM

## (undated) DEVICE — DISPOSABLE TOURNIQUET CUFF SINGLE BLADDER, DUAL PORT AND QUICK CONNECT CONNECTOR: Brand: COLOR CUFF

## (undated) DEVICE — MEDI-VAC NON-CONDUCTIVE SUCTION TUBING 6MM X 1.8M (6FT.) L: Brand: CARDINAL HEALTH

## (undated) DEVICE — MEDI-VAC NON-CONDUCTIVE SUCTION TUBING: Brand: CARDINAL HEALTH

## (undated) DEVICE — SUT COAT VCRL 0 27IN CP-1 ABSRB UD 36MM 1/2

## (undated) NOTE — LETTER
November 28, 2018      To whom it may concern: This is to certify that Jose Wade, YOB: 1975: May return to work as of Monday December 3rd, 2018.     Please call if you have further questions,    Electronically signed by Russell Aviles

## (undated) NOTE — LETTER
03/09/18        [address]      Dear Yola Choi,    Our records indicate that you have outstanding lab work and or testing that was ordered for you and has not yet been completed:          Lipid Panel [E]      Hepa

## (undated) NOTE — LETTER
Diagnosis:  Superior glenoid labrum lesion of left shoulder, initial encounter (V22.836H); Complete tear of left rotator cuff (H34.264)   Surgery: 11/27/18      Authorized # of Visits: (per eval 12 wks)    Insurance: Rafia Leggett EPO/PPO            Next MD visit to perform light to moderate household chores and yard work without limitation        4) The patient will demonstrate proper lifting > 25 lbs necessary to hold, carry and care for an infant without limitation     5) The patient will report a 50-75% improve

## (undated) NOTE — LETTER
10/3/2018              RE: Judah Head        [address] [place] [address]         Dear Leticia Bagley and Chandu Mckeon,    I saw our mutual patient, Kathrine Lee.   He has physical exam findings and MRI imaging consistent with shoulder impingemen

## (undated) NOTE — Clinical Note
Ortho Surgery Request Surgery: Right insertional Achilles tendon repair, partial excision calcaneus, possible gastrocnemius recession     Surgical Assistant: yes Surgery Day Request: next available Estimated Procedure Length: 45 min Anesthesia type: General + popliteal Block  Position: prone  Special Needs: Equipment: Arthrex insertional Achilles tendon repair kit Location:Main OR Post Op Visit Date: 1 -2 Week in [place] CPT Code: 86402, 55900, [id number]     ICD Code: Medical Clearance Needed: no Preadmission Testing Orders: Anesthesia testing protocols and anesthesia pre op orders will be used Additional PAT orders: Pre OP Orders: Use the prophylactic antibiotic protocol Additional Pre Op Orders: PCN Allergy: No If Yes:  __X__ Admit: Hospital outpatient surgery [facility] No